# Patient Record
Sex: MALE | Race: WHITE | NOT HISPANIC OR LATINO | Employment: FULL TIME | URBAN - METROPOLITAN AREA
[De-identification: names, ages, dates, MRNs, and addresses within clinical notes are randomized per-mention and may not be internally consistent; named-entity substitution may affect disease eponyms.]

---

## 2017-03-17 ENCOUNTER — OFFICE VISIT (OUTPATIENT)
Dept: MEDICAL GROUP | Facility: MEDICAL CENTER | Age: 72
End: 2017-03-17
Payer: MEDICARE

## 2017-03-17 VITALS
TEMPERATURE: 98 F | SYSTOLIC BLOOD PRESSURE: 150 MMHG | WEIGHT: 214 LBS | BODY MASS INDEX: 33.59 KG/M2 | HEART RATE: 82 BPM | RESPIRATION RATE: 16 BRPM | OXYGEN SATURATION: 98 % | DIASTOLIC BLOOD PRESSURE: 88 MMHG | HEIGHT: 67 IN

## 2017-03-17 DIAGNOSIS — I25.10 CORONARY ARTERY DISEASE INVOLVING NATIVE CORONARY ARTERY OF NATIVE HEART WITHOUT ANGINA PECTORIS: ICD-10-CM

## 2017-03-17 DIAGNOSIS — Z86.010 HISTORY OF COLONOSCOPY WITH POLYPECTOMY: ICD-10-CM

## 2017-03-17 DIAGNOSIS — Z12.5 SCREENING PSA (PROSTATE SPECIFIC ANTIGEN): ICD-10-CM

## 2017-03-17 DIAGNOSIS — I25.2 HISTORY OF MI (MYOCARDIAL INFARCTION): ICD-10-CM

## 2017-03-17 DIAGNOSIS — Z98.890 HISTORY OF COLONOSCOPY WITH POLYPECTOMY: ICD-10-CM

## 2017-03-17 DIAGNOSIS — E66.9 OBESITY (BMI 30-39.9): ICD-10-CM

## 2017-03-17 PROBLEM — Z86.0100 HISTORY OF COLONOSCOPY WITH POLYPECTOMY: Status: ACTIVE | Noted: 2017-03-17

## 2017-03-17 PROCEDURE — 99204 OFFICE O/P NEW MOD 45 MIN: CPT | Performed by: NURSE PRACTITIONER

## 2017-03-17 RX ORDER — ASPIRIN 81 MG/1
81 TABLET, CHEWABLE ORAL
Status: ON HOLD | COMMUNITY
End: 2021-10-16

## 2017-03-17 ASSESSMENT — PATIENT HEALTH QUESTIONNAIRE - PHQ9: CLINICAL INTERPRETATION OF PHQ2 SCORE: 0

## 2017-03-17 NOTE — MR AVS SNAPSHOT
"        Alexey Caballero   3/17/2017 2:00 PM   Office Visit   MRN: [id number]    Department:  [facility]   Dept Phone:  [phone]    Description:  Male : 1945   Provider:  SATISH Abdi           Reason for Visit     Establish Care           Allergies as of 3/17/2017     No Known Allergies      You were diagnosed with     History of MI (myocardial infarction)   [440646]       Coronary artery disease involving native coronary artery of native heart without angina pectoris   [9402823]       Elevated blood pressure   [515507]       History of colonoscopy with polypectomy   [682128]       Screening PSA (prostate specific antigen)   [742141]         Vital Signs     Blood Pressure Pulse Temperature Respirations Height Weight    150/88 mmHg 82 36.7 °C (98 °F) 16 1.702 m (5' 7.01\") 97.07 kg (214 lb)    Body Mass Index Oxygen Saturation Smoking Status             33.51 kg/m2 98% Former Smoker         Basic Information     Date Of Birth Sex Race Ethnicity Preferred Language    1945 Male White Non- English      Problem List              ICD-10-CM Priority Class Noted - Resolved    History of MI (myocardial infarction) I25.2   3/17/2017 - Present    Coronary artery disease involving native coronary artery of native heart without angina pectoris I25.10   3/17/2017 - Present    Elevated blood pressure I10   3/17/2017 - Present    History of colonoscopy with polypectomy Z98.890, Z86.010   3/17/2017 - Present      Health Maintenance        Date Due Completion Dates    IMM DTaP/Tdap/Td Vaccine (1 - Tdap) 1962 ---    COLONOSCOPY 1993 ---    IMM ZOSTER VACCINE 2003 ---    IMM PNEUMOCOCCAL 65+ (ADULT) LOW/MEDIUM RISK SERIES (1 of 2 - PCV13) 2008 ---    IMM INFLUENZA (1) 2016 ---            Current Immunizations     No immunizations on file.      Below and/or attached are the medications your provider expects you to take. Review all of your home medications and newly ordered " medications with your provider and/or pharmacist. Follow medication instructions as directed by your provider and/or pharmacist. Please keep your medication list with you and share with your provider. Update the information when medications are discontinued, doses are changed, or new medications (including over-the-counter products) are added; and carry medication information at all times in the event of emergency situations     Allergies:  No Known Allergies          Medications  Valid as of: March 17, 2017 -  2:43 PM    Generic Name Brand Name Tablet Size Instructions for use    Aspirin (Chew Tab) ASA 81 MG Take 81 mg by mouth every day.        .                 Medicines prescribed today were sent to:     [id number] - [place], [place] - [address]    [address] [place] [address]    Phone: [phone] Fax: [phone]    Open 24 Hours?: No      Medication refill instructions:       If your prescription bottle indicates you have medication refills left, it is not necessary to call your provider’s office. Please contact your pharmacy and they will refill your medication.    If your prescription bottle indicates you do not have any refills left, you may request refills at any time through one of the following ways: The online [employer] system (except Urgent Care), by calling your provider’s office, or by asking your pharmacy to contact your provider’s office with a refill request. Medication refills are processed only during regular business hours and may not be available until the next business day. Your provider may request additional information or to have a follow-up visit with you prior to refilling your medication.   *Please Note: Medication refills are assigned a new Rx number when refilled electronically. Your pharmacy may indicate that no refills were authorized even though a new prescription for the same medication is available at the pharmacy. Please request the medicine by name with the  pharmacy before contacting your provider for a refill.        Your To Do List     Future Labs/Procedures Complete By Expires    COMP METABOLIC PANEL  As directed 3/18/2018    CRP HIGH SENSITIVE (CARDIAC)  As directed 3/18/2018    LIPID PROFILE  As directed 3/18/2018    PROSTATE SPECIFIC AG SCREENING  As directed 3/18/2018         MyChart Access Code: Activation code not generated  Current MyChart Status: Active

## 2017-03-17 NOTE — PROGRESS NOTES
Chief Complaint   Patient presents with   • [facility] Care     Alexey Caballero is a 71 y.o. male here to [facility], he is recently relocated from [place]. 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 We discussed:    History of MI (myocardial infarction)  2007  Last stress test nearly normal, very minimal residual damage  Had been on several medications following the initial event- statin, metoprolol, plavix, ASA  Gradually discontinued all medications, last took statin prior to moving here a few months ago  He understands his risk for recurrence is increased when not on medication, states that this is been thoroughly reviewed with him in the past  He is exercising regularly, following a very healthy diet  Coronary artery disease involving native coronary artery of native heart without angina pectoris  2 stents placed in 2007  No recent chest pain  Elevated blood pressure  Previously on metoprolol then atenolol  Took himself medication over period of several years  BP today 150/88  Home readings around 135/80 but has not checked recently  No CP, sob, dizziness  History of colonoscopy with polypectomy  2010 with suspicious polyp, repeat completed in 2013 was normal    Current medicines (including changes today)  Current Outpatient Prescriptions   Medication Sig Dispense Refill   • aspirin (ASA) 81 MG Chew Tab chewable tablet Take 81 mg by mouth every day.       No current facility-administered medications for this visit.     He  has a past medical history of Allergy; Arthritis; Blood transfusion without reported diagnosis; Cancer (CMS-[facility]); and Heart attack (CMS-[facility]).  He  has past surgical history that includes colon resection; open reduction; and athroplasty.  Social History   Substance Use Topics   • Smoking status: Former Smoker   • Smokeless tobacco: Never Used   • Alcohol Use: Yes     Social History     Social History Narrative   • No narrative on file     Family History   Problem Relation Age of  "Onset   • Arthritis Father    • Heart Disease Father    • Hyperlipidemia Father      No family status information on file.     ROS  Problems listed discussed above, all other systems reviewed and negative     Objective:     Blood pressure 150/88, pulse 82, temperature 36.7 °C (98 °F), resp. rate 16, height 1.702 m (5' 7.01\"), weight 97.07 kg (214 lb), SpO2 98 %. Body mass index is 33.51 kg/(m^2).  Physical Exam:  General: Alert, oriented in no acute distress.  Eye contact is good, speech is normal, affect calm  HEENT: EOMI, perrl, Oral mucosa pink moist, no lesions. Nares patent. TMs gray with good landmarks bilaterally. No cervical or supraclavicular lymphadenopathy  Lungs: clear to auscultation bilaterally, good aeration, normal effort. No wheeze/ rhonchi/ rales.  CV: regular rate and rhythm, S1, S2. No murmur, no JVD, no edema. Pedal pulses 2 + bilaterally  Abdomen: soft, nontender, BS x4, no hepatosplenomegaly.  Ext: color normal, vascularity normal, temperature normal. No rash or lesions.  MS: no point tenderness over spine, no obvious deformity. No joint swelling or redness. Strength is 5/5 globally  Neuro: DTR 2+ bilaterally   Assessment and Plan:   The following treatment plan was discussed   1. History of MI (myocardial infarction)   MI in 2007, he has gradually discontinued all of his medications in the last few years. Feels that he is doing very well from a cardiac standpoint, not had any chest pain, exercises regularly without difficulty. We have reviewed general recommendation for continuation of statins in patients with known CAD, he verbalizes full understanding and is adamantly opposed to restarting any medication at this time. Check labs as listed below, follow-up pending results    2. Coronary artery disease involving native coronary artery of native heart without angina pectoris   same as above  LIPID PROFILE    CRP HIGH SENSITIVE (CARDIAC)   3. Elevated blood pressure   blood pressure 150/80 at " the office today. Encourage more home monitoring, notify me for readings greater than 140/90  COMP METABOLIC PANEL   4. History of colonoscopy with polypectomy   colonoscopy record requested    5. Screening PSA (prostate specific antigen)  PROSTATE SPECIFIC AG SCREENING   6. Obesity (BMI 30-39.9)  Patient identified as having weight management issue.  Appropriate orders and counseling given.       Educated in proper administration of medication(s) ordered today including safety, possible SE, risks, benefits, rationale and alternatives to therapy.     Followup: Pending labs             Please note that this dictation was created using voice recognition software. I have worked with consultants from the vendor as well as technical experts from [employer] [facility] [employer] to optimize the interface. I have made every reasonable attempt to correct obvious errors, but I expect that there are errors of grammar and possibly content that I did not discover before finalizing the note.

## 2017-03-17 NOTE — ASSESSMENT & PLAN NOTE
2007  Last stress test nearly normal, very minimal residual damage  Had been on several medications following the initial event- statin, metoprolol, plavix, ASA  Gradually discontinued all medications, last took statin prior to moving here a few months ago  He understands his risk for recurrence is increased when not on medication, states that this is been thoroughly reviewed with him in the past

## 2017-03-17 NOTE — ASSESSMENT & PLAN NOTE
Previously on metoprolol then atenolol  Took himself medication over period of several years  BP today 150/88  Home readings around 135/80 but has not checked recently  No CP, sob, dizziness

## 2017-03-20 ENCOUNTER — HOSPITAL ENCOUNTER (OUTPATIENT)
Dept: LAB | Facility: MEDICAL CENTER | Age: 72
End: 2017-03-20
Attending: NURSE PRACTITIONER
Payer: MEDICARE

## 2017-03-20 DIAGNOSIS — Z12.5 SCREENING PSA (PROSTATE SPECIFIC ANTIGEN): ICD-10-CM

## 2017-03-20 DIAGNOSIS — I25.10 CORONARY ARTERY DISEASE INVOLVING NATIVE CORONARY ARTERY OF NATIVE HEART WITHOUT ANGINA PECTORIS: ICD-10-CM

## 2017-03-20 LAB
ALBUMIN SERPL BCP-MCNC: 4.4 G/DL (ref 3.2–4.9)
ALBUMIN/GLOB SERPL: 1.4 G/DL
ALP SERPL-CCNC: 60 U/L (ref 30–99)
ALT SERPL-CCNC: 21 U/L (ref 2–50)
ANION GAP SERPL CALC-SCNC: 7 MMOL/L (ref 0–11.9)
AST SERPL-CCNC: 21 U/L (ref 12–45)
BILIRUB SERPL-MCNC: 0.7 MG/DL (ref 0.1–1.5)
BUN SERPL-MCNC: 14 MG/DL (ref 8–22)
CALCIUM SERPL-MCNC: 9.8 MG/DL (ref 8.5–10.5)
CHLORIDE SERPL-SCNC: 104 MMOL/L (ref 96–112)
CHOLEST SERPL-MCNC: 225 MG/DL (ref 100–199)
CO2 SERPL-SCNC: 30 MMOL/L (ref 20–33)
CREAT SERPL-MCNC: 1.1 MG/DL (ref 0.5–1.4)
CRP SERPL HS-MCNC: 0.3 MG/L (ref 0–7.5)
GLOBULIN SER CALC-MCNC: 3.1 G/DL (ref 1.9–3.5)
GLUCOSE SERPL-MCNC: 99 MG/DL (ref 65–99)
HDLC SERPL-MCNC: 58 MG/DL
LDLC SERPL CALC-MCNC: 153 MG/DL
POTASSIUM SERPL-SCNC: 4.6 MMOL/L (ref 3.6–5.5)
PROT SERPL-MCNC: 7.5 G/DL (ref 6–8.2)
PSA SERPL DL<=0.01 NG/ML-MCNC: 1.82 NG/ML (ref 0–4)
SODIUM SERPL-SCNC: 141 MMOL/L (ref 135–145)
TRIGL SERPL-MCNC: 71 MG/DL (ref 0–149)

## 2017-03-20 PROCEDURE — 84153 ASSAY OF PSA TOTAL: CPT

## 2017-03-20 PROCEDURE — 80061 LIPID PANEL: CPT

## 2017-03-20 PROCEDURE — 80053 COMPREHEN METABOLIC PANEL: CPT

## 2017-03-20 PROCEDURE — 36415 COLL VENOUS BLD VENIPUNCTURE: CPT

## 2017-03-20 PROCEDURE — 86141 C-REACTIVE PROTEIN HS: CPT

## 2017-12-12 ENCOUNTER — OFFICE VISIT (OUTPATIENT)
Dept: MEDICAL GROUP | Facility: MEDICAL CENTER | Age: 72
End: 2017-12-12
Payer: MEDICARE

## 2017-12-12 VITALS
HEIGHT: 67 IN | WEIGHT: 219.4 LBS | BODY MASS INDEX: 34.44 KG/M2 | SYSTOLIC BLOOD PRESSURE: 138 MMHG | TEMPERATURE: 97.2 F | HEART RATE: 65 BPM | OXYGEN SATURATION: 97 % | DIASTOLIC BLOOD PRESSURE: 84 MMHG

## 2017-12-12 DIAGNOSIS — L82.1 SEBORRHEIC KERATOSIS: ICD-10-CM

## 2017-12-12 DIAGNOSIS — I25.2 HISTORY OF MI (MYOCARDIAL INFARCTION): ICD-10-CM

## 2017-12-12 DIAGNOSIS — Z12.11 SCREENING FOR COLON CANCER: ICD-10-CM

## 2017-12-12 DIAGNOSIS — E78.00 PURE HYPERCHOLESTEROLEMIA: ICD-10-CM

## 2017-12-12 DIAGNOSIS — E66.9 OBESITY (BMI 30-39.9): ICD-10-CM

## 2017-12-12 DIAGNOSIS — I25.10 CORONARY ARTERY DISEASE INVOLVING NATIVE CORONARY ARTERY OF NATIVE HEART WITHOUT ANGINA PECTORIS: ICD-10-CM

## 2017-12-12 PROCEDURE — 99214 OFFICE O/P EST MOD 30 MIN: CPT | Performed by: NURSE PRACTITIONER

## 2017-12-12 NOTE — ASSESSMENT & PLAN NOTE
Last labs reviewed, on a statin at one point as discussed above  Now states it is following a very healthy diet, exercising regularly

## 2017-12-12 NOTE — PROGRESS NOTES
"Subjective:     Chief Complaint   Patient presents with   • Other     mole      Alexey Caballero is a 72 y.o. male here today to follow up on:    Seborrheic keratosis  Mid back, present for the last several years, does feel it is growing recently  No pain, bleeding    Coronary artery disease involving native coronary artery of native heart without angina pectoris  2 stents in place, MI in 2007  At one point he was on metoprolol, statin, Plavix, aspirin  Gradually discontinued all meds other than aspirin  States medication \"made me feel terrible\" and believes all medication are poison, adamantly opposed to any medication  Has not seen a cardiologist last few years  No recent chest pain, diaphoresis, activity intolerance    Pure hypercholesterolemia  Last labs reviewed, on a statin at one point as discussed above  Now states it is following a very healthy diet, exercising regularly       Current medicines (including changes today)  Current Outpatient Prescriptions   Medication Sig Dispense Refill   • aspirin (ASA) 81 MG Chew Tab chewable tablet Take 81 mg by mouth every day.       No current facility-administered medications for this visit.      He  has a past medical history of Allergy; Arthritis; Blood transfusion without reported diagnosis; Cancer (CMS-[facility]); and Heart attack.    ROS included above     Objective:     Blood pressure 138/84, pulse 65, temperature 36.2 °C (97.2 °F), height 1.702 m (5' 7.01\"), weight 99.5 kg (219 lb 6.4 oz), SpO2 97 %. Body mass index is 34.35 kg/m².     Physical Exam:  General: Alert, oriented in no acute distress.  Eye contact is good, speech is normal, affect calm  Lungs: clear to auscultation bilaterally, normal effort, no wheeze/ rhonchi/ rales.  CV: regular rate and rhythm, S1, S2, no murmur  Abdomen: soft, nontender  Ext: Approximately 3 cm rough raised hyperpigmented lesion in the mid back. No surrounding erythema, no drainage. No edema, color normal, vascularity normal, temperature " normal    Assessment and Plan:   The following treatment plan was discussed  1. Seborrheic keratosis  Benign growth. Reassurance given. Continue to monitor    2. Pure hypercholesterolemia  Previously on statin, now working on diet and exercise. Adamantly opposed to any medications    3. Coronary artery disease involving native coronary artery of native heart without angina pectoris  MI in 2007, 2 stents placed. Gradually discontinued all of this medication with the exception of aspirin. Has not seen a cardiologist in the last few years. Referral placed for consultation. Discussed risk for recurrence of MI, patient verbalizes understanding and again declines any medication  REFERRAL TO CARDIOLOGY   4. History of MI (myocardial infarction)  REFERRAL TO CARDIOLOGY   5. Screening for colon cancer  OCCULT BLOOD FECES IMMUNOASSAY   6. Obesity (BMI 30-39.9)  Patient identified as having weight management issue.  Appropriate orders and counseling given.       Followup: As needed         Please note that this dictation was created using voice recognition software. I have worked with consultants from the vendor as well as technical experts from [facility] [employer] to optimize the interface. I have made every reasonable attempt to correct obvious errors, but I expect that there are errors of grammar and possibly content that I did not discover before finalizing the note.

## 2017-12-12 NOTE — ASSESSMENT & PLAN NOTE
"2 stents in place, MI in 2007  At one point he was on metoprolol, statin, Plavix, aspirin  Gradually discontinued all meds other than aspirin  States medication \"made me feel terrible\" and believes all medication are poison, adamantly opposed to any medication  Has not seen a cardiologist last few years  No recent chest pain, diaphoresis, activity intolerance  "

## 2018-01-03 ENCOUNTER — OFFICE VISIT (OUTPATIENT)
Dept: CARDIOLOGY | Facility: MEDICAL CENTER | Age: 73
End: 2018-01-03
Payer: MEDICARE

## 2018-01-03 VITALS
HEART RATE: 72 BPM | DIASTOLIC BLOOD PRESSURE: 70 MMHG | WEIGHT: 217 LBS | HEIGHT: 67 IN | OXYGEN SATURATION: 92 % | SYSTOLIC BLOOD PRESSURE: 130 MMHG | BODY MASS INDEX: 34.06 KG/M2

## 2018-01-03 DIAGNOSIS — Z78.9 STATIN INTOLERANCE: ICD-10-CM

## 2018-01-03 DIAGNOSIS — I10 ESSENTIAL HYPERTENSION: ICD-10-CM

## 2018-01-03 DIAGNOSIS — I25.2 OLD MI (MYOCARDIAL INFARCTION): ICD-10-CM

## 2018-01-03 DIAGNOSIS — E66.9 OBESITY (BMI 30.0-34.9): ICD-10-CM

## 2018-01-03 DIAGNOSIS — Z95.5 STENTED CORONARY ARTERY: ICD-10-CM

## 2018-01-03 DIAGNOSIS — I25.119 CORONARY ARTERY DISEASE WITH ANGINA PECTORIS, UNSPECIFIED VESSEL OR LESION TYPE, UNSPECIFIED WHETHER NATIVE OR TRANSPLANTED HEART (HCC): ICD-10-CM

## 2018-01-03 DIAGNOSIS — E78.5 HYPERLIPIDEMIA, UNSPECIFIED HYPERLIPIDEMIA TYPE: ICD-10-CM

## 2018-01-03 LAB — EKG IMPRESSION: NORMAL

## 2018-01-03 PROCEDURE — 93000 ELECTROCARDIOGRAM COMPLETE: CPT | Performed by: INTERNAL MEDICINE

## 2018-01-03 PROCEDURE — 99204 OFFICE O/P NEW MOD 45 MIN: CPT | Mod: 25 | Performed by: INTERNAL MEDICINE

## 2018-01-03 NOTE — PROGRESS NOTES
Cardiology Consult Note:    Anabella Richard  Date & Time note created:    1/3/2018   1:40 PM       Patient ID:  Name:             Alexey Caballero     YOB: 1945  Age:                 72 y.o.  male   MRN:               [id number]                                                             Chief Complaint:   Chief Complaint   Patient presents with   • Coronary Artery Disease     New patient              History of Present Illness:   Alexey Caballero has move from [facility] since 6/2016 to establish with us; H/o MI (inf?) 10 yrs ago with 2 stents; No able to use Statin; 4/1975 hurt on job    Review of Systems:     Constitutional: Denies fevers, Denies weight changes  Eyes: Denies changes in vision, no eye pain  Ears/Nose/Throat/Mouth: Denies nasal congestion or sore throat   Cardiovascular: Denies chest pain or palpitations   Respiratory: Denies shortness of breath , Denies cough  Gastrointestinal/Hepatic: Denies abdominal pain, nausea, vomiting, diarrhea, constipation or GI bleeding   Genitourinary: Denies bladder dysfunction, dysuria or frequency  Musculoskeletal/Rheum: Denies  joint pain and swelling   Skin/Breast: Denies rash, denies breast lumps or discharge  Neurological: Denies headache, confusion, memory loss or focal weakness/parasthesias  Psychiatric: denies mood disorder   Endocrine: denies hx of diabetes or thyroid dysfunction  Heme/Oncology/Lymph Nodes: Denies enlarged lymph nodes, denies bruising or known bleeding disorder  Allergic/Immunologic: Denies hx of allergies      All other systems were reviewed and are negative (AMA/CMS criteria)    Past Medical History:   Past Medical History:   Diagnosis Date   • Allergy    • Arthritis    • Blood transfusion without reported diagnosis    • Cancer (CMS-HCC)    • Heart attack          Past Surgical History:  Past Surgical History:   Procedure Laterality Date   • ATHROPLASTY     • COLON RESECTION     • OPEN REDUCTION         Hospital Medications:    Current  "Outpatient Prescriptions:   •  aspirin (ASA) 81 MG Chew Tab chewable tablet, Take 81 mg by mouth every day., Disp: , Rfl:     Current Outpatient Medications:  Current Outpatient Prescriptions   Medication Sig Dispense Refill   • aspirin (ASA) 81 MG Chew Tab chewable tablet Take 81 mg by mouth every day.       No current facility-administered medications for this visit.          Medication Allergy/Sensitivities:  Allergies   Allergen Reactions   • Tape Rash     Pt requests only paper tape as hypoallergenic tape gives him a severe rash.        Family History:    Family History   Problem Relation Age of Onset   • Arthritis Father    • Heart Disease Father    • Hyperlipidemia Father        Social History:  Social History     Social History   • Marital status: Single     Spouse name: N/A   • Number of children: N/A   • Years of education: N/A     Occupational History   • Not on file.     Social History Main Topics   • Smoking status: Former Smoker   • Smokeless tobacco: Never Used   • Alcohol use 0.0 oz/week   • Drug use: No   • Sexual activity: No     Other Topics Concern   • Not on file     Social History Narrative   • No narrative on file         Physical Exam:  Vitals  Weight/BMI: Body mass index is 33.98 kg/m².  Blood pressure 130/70, pulse 72, height 1.702 m (5' 7.01\"), weight 98.4 kg (217 lb), SpO2 92 %.  Vitals:    01/03/18 1319   BP: 130/70   Pulse: 72   SpO2: 92%   Weight: 98.4 kg (217 lb)   Height: 1.702 m (5' 7.01\")     Oxygen Therapy:  Pulse Oximetry: 92 %  General Appearance:   Well developed, Well nourished, No acute distress, Non-toxic appearance.   HENT:  Normocephalic, Atraumatic, Oropharynx moist mucous membranes, Dentition: , Nose normal.    Eyes:  PERRLA, EOMI, Conjunctiva normal, No discharge.  Neck:  Normal range of motion, No cervical tenderness, Supple, No stridor, no JVD .  No thyromegaly.  No carotid bruit.  Cardiovascular:  Normal heart rate, Normal rhythm,  S1, S2, no S3,  S4; No gallops; No " murmurs, No rubs, .   Extremitites with intact distal pulses, no cyanosis, clubbing or edema.  No heaves, thrills, HJR;  Peripheral pulses: carotid 2+, brachial 2+, radial 2+, ulnar 2+, femoral 2+, popliteal 2+, PT 2+, DP 2+;  Lungs:  Respiratory effort is normal. Normal breath sounds, breath sounds clear to auscultation bilaterally,  no rales, no rhonchi, no wheezing.   Abdomen: Bowel sounds normal, Soft, No tenderness, No guarding, No rebound, No masses, No hepatosplenomegaly.  Skin: Warm, Dry, No erythema, No rash, no induration or crepitus.  Neurologic: Alert & oriented x 3, Normal motor function, Normal sensory function, No focal deficits noted, cranial nerves II through XII are normal,  normal gait.  Psychiatric: Affect normal, Judgment normal, Mood normal.      Data Review:     Records reviewed and summarized in current documentation    Lab Data Review:  Lab Results   Component Value Date/Time    SODIUM 141 03/20/2017 11:55 AM    POTASSIUM 4.6 03/20/2017 11:55 AM    CHLORIDE 104 03/20/2017 11:55 AM    CO2 30 03/20/2017 11:55 AM    GLUCOSE 99 03/20/2017 11:55 AM    BUN 14 03/20/2017 11:55 AM    CREATININE 1.10 03/20/2017 11:55 AM   Results for JERICA YBARRA (MRN [id number]) as of 1/3/2018 13:39   Ref. Range 3/20/2017 11:55   Cholesterol,Tot Latest Ref Range: 100 - 199 mg/dL 225 (H)   Triglycerides Latest Ref Range: 0 - 149 mg/dL 71   HDL Latest Ref Range: >=40 mg/dL 58   LDL Latest Ref Range: <100 mg/dL 153 (H)      No results found for: PROTHROMBTM, INR   No results found for: WBC, RBC, HEMOGLOBIN, HEMATOCRIT, MCV, MCH, MCHC, MPV, NEUTSPOLYS, LYMPHOCYTES, MONOCYTES, EOSINOPHILS, BASOPHILS, HYPOCHROMIA, ANISOCYTOSIS     Imaging/Procedures Review:    To my review shows:na    EKG To my review shows:SR with abn inf t waves    Assessment and Plan.   72 y.o. male has MI 10 yrs ago with 2 stents; Less active than before; Unable to use statin with intolerance ; last use 1.5 yrs ago; High CV risk and should consider  PCSK9 inhibitor;   No sleep issues;   Pls see orders    1. Coronary artery disease with angina pectoris, unspecified vessel or lesion type, unspecified whether native or transplanted heart (CMS-HCC)    - EKG    2. Old MI (myocardial infarction)  Inf?    3. Stented coronary artery  From [place]; need to have record    4. Hyperlipidemia, unspecified hyperlipidemia type  Recheck; LDL is elevated     5. Essential hypertension  controlled    6. Obesity (BMI 30.0-34.9)  discussed      Return to clinic in  1 , months  1. Coronary artery disease with angina pectoris, unspecified vessel or lesion type, unspecified whether native or transplanted heart (CMS-HCC)  EKG   2. Old MI (myocardial infarction)      2007 with 2 stents   3. Stented coronary artery     4. Hyperlipidemia, unspecified hyperlipidemia type     5. Essential hypertension     6. Obesity (BMI 30.0-34.9)

## 2018-01-03 NOTE — LETTER
[facility]   [address]   [address]  [place] [address]  Phone: [phone]  Fax: [phone]              Alexey Caballero  1945    Encounter Date: 1/3/2018    SATISH Abdi    Thank you for the referral. I had the pleasure of seeing Alexey Caballero today in cardiology clinic. I've attached my visit note below. If you have any questions please feel free to give me a call anytime.      Anabella Richard MD, PhD, [facility]  Cardiology and Lipidology  [facility]                                                                    PROGRESS NOTE:  Cardiology Consult Note:    Anabella Richard  Date & Time note created:    1/3/2018   1:40 PM       Patient ID:  Name:             Alexey Caballero     YOB: 1945  Age:                 72 y.o.  male   MRN:               [id number]                                                             Chief Complaint:   Chief Complaint   Patient presents with   • Coronary Artery Disease     New patient              History of Present Illness:   Alexey Caballero has move from [facility] since 6/2016 to establish with us; H/o MI (inf?) 10 yrs ago with 2 stents; No able to use Statin; 4/1975 hurt on job    Review of Systems:     Constitutional: Denies fevers, Denies weight changes  Eyes: Denies changes in vision, no eye pain  Ears/Nose/Throat/Mouth: Denies nasal congestion or sore throat   Cardiovascular: Denies chest pain or palpitations   Respiratory: Denies shortness of breath , Denies cough  Gastrointestinal/Hepatic: Denies abdominal pain, nausea, vomiting, diarrhea, constipation or GI bleeding   Genitourinary: Denies bladder dysfunction, dysuria or frequency  Musculoskeletal/Rheum: Denies  joint pain and swelling   Skin/Breast: Denies rash, denies breast lumps or discharge  Neurological: Denies headache, confusion, memory loss or focal weakness/parasthesias  Psychiatric: denies  "mood disorder   Endocrine: denies hx of diabetes or thyroid dysfunction  Heme/Oncology/Lymph Nodes: Denies enlarged lymph nodes, denies bruising or known bleeding disorder  Allergic/Immunologic: Denies hx of allergies      All other systems were reviewed and are negative (AMA/CMS criteria)    Past Medical History:   Past Medical History:   Diagnosis Date   • Allergy    • Arthritis    • Blood transfusion without reported diagnosis    • Cancer (CMS-HCC)    • Heart attack          Past Surgical History:  Past Surgical History:   Procedure Laterality Date   • ATHROPLASTY     • COLON RESECTION     • OPEN REDUCTION         Hospital Medications:    Current Outpatient Prescriptions:   •  aspirin (ASA) 81 MG Chew Tab chewable tablet, Take 81 mg by mouth every day., Disp: , Rfl:     Current Outpatient Medications:  Current Outpatient Prescriptions   Medication Sig Dispense Refill   • aspirin (ASA) 81 MG Chew Tab chewable tablet Take 81 mg by mouth every day.       No current facility-administered medications for this visit.          Medication Allergy/Sensitivities:  Allergies   Allergen Reactions   • Tape Rash     Pt requests only paper tape as hypoallergenic tape gives him a severe rash.        Family History:    Family History   Problem Relation Age of Onset   • Arthritis Father    • Heart Disease Father    • Hyperlipidemia Father        Social History:  Social History     Social History   • Marital status: Single     Spouse name: N/A   • Number of children: N/A   • Years of education: N/A     Occupational History   • Not on file.     Social History Main Topics   • Smoking status: Former Smoker   • Smokeless tobacco: Never Used   • Alcohol use 0.0 oz/week   • Drug use: No   • Sexual activity: No     Other Topics Concern   • Not on file     Social History Narrative   • No narrative on file         Physical Exam:  Vitals  Weight/BMI: Body mass index is 33.98 kg/m².  Blood pressure 130/70, pulse 72, height 1.702 m (5' 7.01\"), " "weight 98.4 kg (217 lb), SpO2 92 %.  Vitals:    01/03/18 1319   BP: 130/70   Pulse: 72   SpO2: 92%   Weight: 98.4 kg (217 lb)   Height: 1.702 m (5' 7.01\")     Oxygen Therapy:  Pulse Oximetry: 92 %  General Appearance:   Well developed, Well nourished, No acute distress, Non-toxic appearance.   HENT:  Normocephalic, Atraumatic, Oropharynx moist mucous membranes, Dentition: , Nose normal.    Eyes:  PERRLA, EOMI, Conjunctiva normal, No discharge.  Neck:  Normal range of motion, No cervical tenderness, Supple, No stridor, no JVD .  No thyromegaly.  No carotid bruit.  Cardiovascular:  Normal heart rate, Normal rhythm,  S1, S2, no S3,  S4; No gallops; No murmurs, No rubs, .   Extremitites with intact distal pulses, no cyanosis, clubbing or edema.  No heaves, thrills, HJR;  Peripheral pulses: carotid 2+, brachial 2+, radial 2+, ulnar 2+, femoral 2+, popliteal 2+, PT 2+, DP 2+;  Lungs:  Respiratory effort is normal. Normal breath sounds, breath sounds clear to auscultation bilaterally,  no rales, no rhonchi, no wheezing.   Abdomen: Bowel sounds normal, Soft, No tenderness, No guarding, No rebound, No masses, No hepatosplenomegaly.  Skin: Warm, Dry, No erythema, No rash, no induration or crepitus.  Neurologic: Alert & oriented x 3, Normal motor function, Normal sensory function, No focal deficits noted, cranial nerves II through XII are normal,  normal gait.  Psychiatric: Affect normal, Judgment normal, Mood normal.      Data Review:     Records reviewed and summarized in current documentation    Lab Data Review:  Lab Results   Component Value Date/Time    SODIUM 141 03/20/2017 11:55 AM    POTASSIUM 4.6 03/20/2017 11:55 AM    CHLORIDE 104 03/20/2017 11:55 AM    CO2 30 03/20/2017 11:55 AM    GLUCOSE 99 03/20/2017 11:55 AM    BUN 14 03/20/2017 11:55 AM    CREATININE 1.10 03/20/2017 11:55 AM   Results for TATE JERICA (MRN [id number]) as of 1/3/2018 13:39   Ref. Range 3/20/2017 11:55   Cholesterol,Tot Latest Ref Range: 100 - 199 " mg/dL 225 (H)   Triglycerides Latest Ref Range: 0 - 149 mg/dL 71   HDL Latest Ref Range: >=40 mg/dL 58   LDL Latest Ref Range: <100 mg/dL 153 (H)      No results found for: PROTHROMBTM, INR   No results found for: WBC, RBC, HEMOGLOBIN, HEMATOCRIT, MCV, MCH, MCHC, MPV, NEUTSPOLYS, LYMPHOCYTES, MONOCYTES, EOSINOPHILS, BASOPHILS, HYPOCHROMIA, ANISOCYTOSIS     Imaging/Procedures Review:    To my review shows:na    EKG To my review shows:SR with abn inf t waves    Assessment and Plan.   72 y.o. male has MI 10 yrs ago with 2 stents; Less active than before; Unable to use statin with intolerance ; last use 1.5 yrs ago; High CV risk and should consider PCSK9 inhibitor;   No sleep issues;   Pls see orders    1. Coronary artery disease with angina pectoris, unspecified vessel or lesion type, unspecified whether native or transplanted heart (CMS-HCC)    - EKG    2. Old MI (myocardial infarction)  Inf?    3. Stented coronary artery  From [place]; need to have record    4. Hyperlipidemia, unspecified hyperlipidemia type  Recheck; LDL is elevated     5. Essential hypertension  controlled    6. Obesity (BMI 30.0-34.9)  discussed      Return to clinic in  1 , months  1. Coronary artery disease with angina pectoris, unspecified vessel or lesion type, unspecified whether native or transplanted heart (CMS-HCC)  EKG   2. Old MI (myocardial infarction)      2007 with 2 stents   3. Stented coronary artery     4. Hyperlipidemia, unspecified hyperlipidemia type     5. Essential hypertension     6. Obesity (BMI 30.0-34.9)           Nina Reddy, VALENCIA.P.R.N.  [address]  [address]  [facility] [address]  VIA In Basket

## 2018-01-09 ENCOUNTER — APPOINTMENT (OUTPATIENT)
Dept: CARDIOLOGY | Facility: MEDICAL CENTER | Age: 73
End: 2018-01-09
Attending: INTERNAL MEDICINE
Payer: MEDICARE

## 2018-01-10 ENCOUNTER — HOSPITAL ENCOUNTER (OUTPATIENT)
Dept: LAB | Facility: MEDICAL CENTER | Age: 73
End: 2018-01-10
Attending: INTERNAL MEDICINE
Payer: MEDICARE

## 2018-01-10 ENCOUNTER — HOSPITAL ENCOUNTER (OUTPATIENT)
Dept: CARDIOLOGY | Facility: MEDICAL CENTER | Age: 73
End: 2018-01-10
Attending: INTERNAL MEDICINE
Payer: MEDICARE

## 2018-01-10 DIAGNOSIS — I25.119 CORONARY ARTERY DISEASE WITH ANGINA PECTORIS, UNSPECIFIED VESSEL OR LESION TYPE, UNSPECIFIED WHETHER NATIVE OR TRANSPLANTED HEART (HCC): ICD-10-CM

## 2018-01-10 DIAGNOSIS — E78.5 HYPERLIPIDEMIA, UNSPECIFIED HYPERLIPIDEMIA TYPE: ICD-10-CM

## 2018-01-10 LAB
ALBUMIN SERPL BCP-MCNC: 4.4 G/DL (ref 3.2–4.9)
ALBUMIN/GLOB SERPL: 1.8 G/DL
ALP SERPL-CCNC: 46 U/L (ref 30–99)
ALT SERPL-CCNC: 20 U/L (ref 2–50)
ANION GAP SERPL CALC-SCNC: 6 MMOL/L (ref 0–11.9)
AST SERPL-CCNC: 20 U/L (ref 12–45)
BILIRUB SERPL-MCNC: 1.2 MG/DL (ref 0.1–1.5)
BUN SERPL-MCNC: 18 MG/DL (ref 8–22)
CALCIUM SERPL-MCNC: 9.5 MG/DL (ref 8.5–10.5)
CHLORIDE SERPL-SCNC: 104 MMOL/L (ref 96–112)
CHOLEST SERPL-MCNC: 233 MG/DL (ref 100–199)
CO2 SERPL-SCNC: 29 MMOL/L (ref 20–33)
CREAT SERPL-MCNC: 1.05 MG/DL (ref 0.5–1.4)
ERYTHROCYTE [DISTWIDTH] IN BLOOD BY AUTOMATED COUNT: 43.1 FL (ref 35.9–50)
GLOBULIN SER CALC-MCNC: 2.5 G/DL (ref 1.9–3.5)
GLUCOSE SERPL-MCNC: 92 MG/DL (ref 65–99)
HCT VFR BLD AUTO: 46.3 % (ref 42–52)
HDLC SERPL-MCNC: 58 MG/DL
HGB BLD-MCNC: 15.5 G/DL (ref 14–18)
LDLC SERPL CALC-MCNC: 158 MG/DL
LV EJECT FRACT  99904: 60
LV EJECT FRACT MOD 2C 99903: 53.5
LV EJECT FRACT MOD 4C 99902: 66.25
LV EJECT FRACT MOD BP 99901: 60.01
MCH RBC QN AUTO: 31.5 PG (ref 27–33)
MCHC RBC AUTO-ENTMCNC: 33.5 G/DL (ref 33.7–35.3)
MCV RBC AUTO: 94.1 FL (ref 81.4–97.8)
PLATELET # BLD AUTO: 245 K/UL (ref 164–446)
PMV BLD AUTO: 12.5 FL (ref 9–12.9)
POTASSIUM SERPL-SCNC: 3.8 MMOL/L (ref 3.6–5.5)
PROT SERPL-MCNC: 6.9 G/DL (ref 6–8.2)
RBC # BLD AUTO: 4.92 M/UL (ref 4.7–6.1)
SODIUM SERPL-SCNC: 139 MMOL/L (ref 135–145)
TRIGL SERPL-MCNC: 87 MG/DL (ref 0–149)
TSH SERPL DL<=0.005 MIU/L-ACNC: 5.85 UIU/ML (ref 0.38–5.33)
WBC # BLD AUTO: 6.5 K/UL (ref 4.8–10.8)

## 2018-01-10 PROCEDURE — 84443 ASSAY THYROID STIM HORMONE: CPT

## 2018-01-10 PROCEDURE — 85027 COMPLETE CBC AUTOMATED: CPT

## 2018-01-10 PROCEDURE — 80053 COMPREHEN METABOLIC PANEL: CPT

## 2018-01-10 PROCEDURE — 80061 LIPID PANEL: CPT

## 2018-01-10 PROCEDURE — 93306 TTE W/DOPPLER COMPLETE: CPT | Mod: 26 | Performed by: INTERNAL MEDICINE

## 2018-01-10 PROCEDURE — 36415 COLL VENOUS BLD VENIPUNCTURE: CPT

## 2018-01-10 PROCEDURE — 93306 TTE W/DOPPLER COMPLETE: CPT

## 2018-01-12 ENCOUNTER — TELEPHONE (OUTPATIENT)
Dept: CARDIOLOGY | Facility: MEDICAL CENTER | Age: 73
End: 2018-01-12

## 2018-01-12 NOTE — TELEPHONE ENCOUNTER
Labs sent to PCP. Pt called with update. No answer, left VM for call back.  Morena CARCAMO RN     ----- Message from Anabella Richard MD,FACC sent at 1/12/2018  6:56 AM PST -----  Pls forward to PCP; Thx  ----- Message -----  From: Morena Menon R.N.  Sent: 1/11/2018  12:42 PM  To: Anabella Richard MD,FACC    F/V with you 3/13th

## 2018-01-18 ENCOUNTER — HOSPITAL ENCOUNTER (OUTPATIENT)
Dept: RADIOLOGY | Facility: MEDICAL CENTER | Age: 73
End: 2018-01-18
Attending: INTERNAL MEDICINE
Payer: MEDICARE

## 2018-01-18 DIAGNOSIS — I25.119 CORONARY ARTERY DISEASE WITH ANGINA PECTORIS, UNSPECIFIED VESSEL OR LESION TYPE, UNSPECIFIED WHETHER NATIVE OR TRANSPLANTED HEART (HCC): ICD-10-CM

## 2018-01-18 PROCEDURE — A9502 TC99M TETROFOSMIN: HCPCS

## 2018-01-18 NOTE — PROGRESS NOTES
NM TM completed, able to achieve greater than 85% THR, SOB at end of TM, and remained SOB for approximately 2 minutes, rr unlabored,  then cough started in recovery,  lungs clear, 95% RA, cough remained non productive, assessed until cough resolved, and diastolic BP at 104, baseline standing diastolic 97.

## 2018-03-13 ENCOUNTER — OFFICE VISIT (OUTPATIENT)
Dept: CARDIOLOGY | Facility: MEDICAL CENTER | Age: 73
End: 2018-03-13
Payer: MEDICARE

## 2018-03-13 VITALS
WEIGHT: 211 LBS | SYSTOLIC BLOOD PRESSURE: 122 MMHG | BODY MASS INDEX: 33.12 KG/M2 | DIASTOLIC BLOOD PRESSURE: 72 MMHG | HEIGHT: 67 IN | HEART RATE: 66 BPM | OXYGEN SATURATION: 95 %

## 2018-03-13 DIAGNOSIS — I10 HTN (HYPERTENSION), MALIGNANT: ICD-10-CM

## 2018-03-13 DIAGNOSIS — I25.10 CORONARY ARTERY DISEASE INVOLVING NATIVE CORONARY ARTERY OF NATIVE HEART WITHOUT ANGINA PECTORIS: ICD-10-CM

## 2018-03-13 DIAGNOSIS — Z79.899 HIGH RISK MEDICATION USE: ICD-10-CM

## 2018-03-13 DIAGNOSIS — Z95.5 STENTED CORONARY ARTERY: ICD-10-CM

## 2018-03-13 DIAGNOSIS — Z78.9 STATIN INTOLERANCE: ICD-10-CM

## 2018-03-13 DIAGNOSIS — Z78.9 MEDICATION INTOLERANCE: ICD-10-CM

## 2018-03-13 DIAGNOSIS — E78.5 DYSLIPIDEMIA: ICD-10-CM

## 2018-03-13 PROCEDURE — 99214 OFFICE O/P EST MOD 30 MIN: CPT | Performed by: INTERNAL MEDICINE

## 2018-03-13 RX ORDER — M-VIT,TX,IRON,MINS/CALC/FOLIC 27MG-0.4MG
1 TABLET ORAL DAILY
COMMUNITY
End: 2021-11-10

## 2018-03-13 ASSESSMENT — ENCOUNTER SYMPTOMS
HEADACHES: 0
SPEECH CHANGE: 0
WEIGHT LOSS: 0
DOUBLE VISION: 0
SENSORY CHANGE: 0
FALLS: 0
EYE PAIN: 0
DEPRESSION: 0
FEVER: 0
HALLUCINATIONS: 0
ABDOMINAL PAIN: 0
CLAUDICATION: 0
BLURRED VISION: 0
PALPITATIONS: 0
SHORTNESS OF BREATH: 0
NAUSEA: 0
PND: 0
COUGH: 0
BRUISES/BLEEDS EASILY: 0
EYE DISCHARGE: 0
MYALGIAS: 0
ORTHOPNEA: 0
BLOOD IN STOOL: 0
DIZZINESS: 0
CHILLS: 0
VOMITING: 0
LOSS OF CONSCIOUSNESS: 0

## 2018-03-13 NOTE — LETTER
[facility]   [address]   [address]  [place] [address]  Phone: [phone]  Fax: [phone]              Alexey Caballero  1945    Encounter Date: 3/13/2018    Ricarda Aguilar M.D.          PROGRESS NOTE:  Subjective:   Alexey Caballero is a 72 y.o. male who presents today for cardiac care and management due to coronary arterial disease status post 2 stents placed in coronary system unknown distribution in 2007 in [place], hypertension, hyperlipidemia. Patient did have transthoracic echocardiogram which showed normal LV function with no significant valvular disease.    Over the years, patient was not able to tolerate beta blockers, ace inhibitors, ARB's, he is only on aspirin at this time.    He has severe allergy to statin because of myalgia. Patient became incapacitated physically. At this time he does not want to try any more statins.    Chief Complaint: cad / pci, hlp, htn.    Past Medical History:   Diagnosis Date   • Allergy    • Arthritis    • Blood transfusion without reported diagnosis    • Cancer (CMS-[facility])    • Heart attack      Past Surgical History:   Procedure Laterality Date   • ATHROPLASTY     • COLON RESECTION     • OPEN REDUCTION       Family History   Problem Relation Age of Onset   • Arthritis Father    • Heart Disease Father    • Hyperlipidemia Father      History   Smoking Status   • Former Smoker   Smokeless Tobacco   • Never Used     Allergies   Allergen Reactions   • Tape Rash     Pt requests only paper tape as hypoallergenic tape gives him a severe rash.      Outpatient Encounter Prescriptions as of 3/13/2018   Medication Sig Dispense Refill   • therapeutic multivitamin-minerals (THERAGRAN-M) Tab Take 1 Tab by mouth every day.     • Alirocumab (PRALUENT) 75 MG/ML Solution Pen-injector Inject 75 mg as instructed every 14 days. 2 PEN 11   • aspirin (ASA) 81 MG Chew Tab chewable tablet Take 81 mg by  "mouth every day.       No facility-administered encounter medications on file as of 3/13/2018.      Review of Systems   Constitutional: Negative for chills, fever, malaise/fatigue and weight loss.   HENT: Negative for ear discharge, ear pain, hearing loss and nosebleeds.    Eyes: Negative for blurred vision, double vision, pain and discharge.   Respiratory: Negative for cough and shortness of breath.    Cardiovascular: Negative for chest pain, palpitations, orthopnea, claudication, leg swelling and PND.   Gastrointestinal: Negative for abdominal pain, blood in stool, melena, nausea and vomiting.   Genitourinary: Negative for dysuria and hematuria.   Musculoskeletal: Negative for falls, joint pain and myalgias.   Skin: Negative for itching and rash.   Neurological: Negative for dizziness, sensory change, speech change, loss of consciousness and headaches.   Endo/Heme/Allergies: Negative for environmental allergies. Does not bruise/bleed easily.   Psychiatric/Behavioral: Negative for depression, hallucinations and suicidal ideas.        Objective:   /72   Pulse 66   Ht 1.702 m (5' 7\")   Wt 95.7 kg (211 lb)   SpO2 95%   BMI 33.05 kg/m²      Physical Exam   Constitutional: He is oriented to person, place, and time. He appears well-developed and well-nourished.   HENT:   Head: Normocephalic and atraumatic.   Eyes: EOM are normal.   Neck: Normal range of motion. No JVD present.   Cardiovascular: Normal rate, regular rhythm, normal heart sounds and intact distal pulses.  Exam reveals no gallop and no friction rub.    No murmur heard.  Bilateral femoral pulses are 2+, bilateral dorsalis pedis pulses are 2+, bilateral posterior tibialis pulses are 2+.   Pulmonary/Chest: No respiratory distress. He has no wheezes. He has no rales. He exhibits no tenderness.   Abdominal: Soft. Bowel sounds are normal. There is no tenderness. There is no rebound and no guarding.   The is no presence of abdominal bruits   "   Musculoskeletal: Normal range of motion.   Neurological: He is alert and oriented to person, place, and time.   Skin: Skin is warm and dry.   Psychiatric: He has a normal mood and affect.   Nursing note and vitals reviewed.      Assessment:     1. Stented coronary artery in 2007  Alirocumab (PRALUENT) 75 MG/ML Solution Pen-injector    COMP METABOLIC PANEL    LIPID PANEL   2. HTN (hypertension), malignant  COMP METABOLIC PANEL    LIPID PANEL   3. Dyslipidemia  Alirocumab (PRALUENT) 75 MG/ML Solution Pen-injector   4. High risk medication use  Alirocumab (PRALUENT) 75 MG/ML Solution Pen-injector    COMP METABOLIC PANEL    LIPID PANEL   5. Coronary artery disease involving native coronary artery of native heart without angina pectoris  Alirocumab (PRALUENT) 75 MG/ML Solution Pen-injector    COMP METABOLIC PANEL    LIPID PANEL   6. Statin intolerance  Alirocumab (PRALUENT) 75 MG/ML Solution Pen-injector    COMP METABOLIC PANEL    LIPID PANEL   7. Medication intolerance BetaBlocker, ACE-I, ARB         Medical Decision Making:  Today's Assessment / Status / Plan:   I spent a significant amount of time discussing to patient about risk reduction in the setting of prior coronary artery disease with PCI and stenting in the coronary system.  I explained to him that it is beneficial for us to treat his LDL along with other risk factors aggressively because of prior history of PCI and stenting.  At this time, patient is open to PCSK9 inhibitor therapy.  We will start that therapy for now.  He does not want to try beta blockers, his inhibitors or ARB so spironolactone. He understands the risks.    I will see patient back in clinic with lab tests and studies results in 3 months.    I thank you Nina for referring patient to our Cardiology Clinic today.        Nina Reddy, VALENCIA.P.R.N.  [address]  [address]  [facility] [address]  VIA In Basket

## 2018-03-13 NOTE — PROGRESS NOTES
Subjective:   Alexey Caballero is a 72 y.o. male who presents today for cardiac care and management due to coronary arterial disease status post 2 stents placed in coronary system unknown distribution in 2007 in [place], hypertension, hyperlipidemia. Patient did have transthoracic echocardiogram which showed normal LV function with no significant valvular disease.    Over the years, patient was not able to tolerate beta blockers, ace inhibitors, ARB's, he is only on aspirin at this time.    He has severe allergy to statin because of myalgia. Patient became incapacitated physically. At this time he does not want to try any more statins.    Chief Complaint: cad / pci, hlp, htn.    Past Medical History:   Diagnosis Date   • Allergy    • Arthritis    • Blood transfusion without reported diagnosis    • Cancer (CMS-HCC)    • Heart attack      Past Surgical History:   Procedure Laterality Date   • ATHROPLASTY     • COLON RESECTION     • OPEN REDUCTION       Family History   Problem Relation Age of Onset   • Arthritis Father    • Heart Disease Father    • Hyperlipidemia Father      History   Smoking Status   • Former Smoker   Smokeless Tobacco   • Never Used     Allergies   Allergen Reactions   • Tape Rash     Pt requests only paper tape as hypoallergenic tape gives him a severe rash.      Outpatient Encounter Prescriptions as of 3/13/2018   Medication Sig Dispense Refill   • therapeutic multivitamin-minerals (THERAGRAN-M) Tab Take 1 Tab by mouth every day.     • Alirocumab (PRALUENT) 75 MG/ML Solution Pen-injector Inject 75 mg as instructed every 14 days. 2 PEN 11   • aspirin (ASA) 81 MG Chew Tab chewable tablet Take 81 mg by mouth every day.       No facility-administered encounter medications on file as of 3/13/2018.      Review of Systems   Constitutional: Negative for chills, fever, malaise/fatigue and weight loss.   HENT: Negative for ear discharge, ear pain, hearing loss and nosebleeds.    Eyes: Negative for  "blurred vision, double vision, pain and discharge.   Respiratory: Negative for cough and shortness of breath.    Cardiovascular: Negative for chest pain, palpitations, orthopnea, claudication, leg swelling and PND.   Gastrointestinal: Negative for abdominal pain, blood in stool, melena, nausea and vomiting.   Genitourinary: Negative for dysuria and hematuria.   Musculoskeletal: Negative for falls, joint pain and myalgias.   Skin: Negative for itching and rash.   Neurological: Negative for dizziness, sensory change, speech change, loss of consciousness and headaches.   Endo/Heme/Allergies: Negative for environmental allergies. Does not bruise/bleed easily.   Psychiatric/Behavioral: Negative for depression, hallucinations and suicidal ideas.        Objective:   /72   Pulse 66   Ht 1.702 m (5' 7\")   Wt 95.7 kg (211 lb)   SpO2 95%   BMI 33.05 kg/m²     Physical Exam   Constitutional: He is oriented to person, place, and time. He appears well-developed and well-nourished.   HENT:   Head: Normocephalic and atraumatic.   Eyes: EOM are normal.   Neck: Normal range of motion. No JVD present.   Cardiovascular: Normal rate, regular rhythm, normal heart sounds and intact distal pulses.  Exam reveals no gallop and no friction rub.    No murmur heard.  Bilateral femoral pulses are 2+, bilateral dorsalis pedis pulses are 2+, bilateral posterior tibialis pulses are 2+.   Pulmonary/Chest: No respiratory distress. He has no wheezes. He has no rales. He exhibits no tenderness.   Abdominal: Soft. Bowel sounds are normal. There is no tenderness. There is no rebound and no guarding.   The is no presence of abdominal bruits   Musculoskeletal: Normal range of motion.   Neurological: He is alert and oriented to person, place, and time.   Skin: Skin is warm and dry.   Psychiatric: He has a normal mood and affect.   Nursing note and vitals reviewed.      Assessment:     1. Stented coronary artery in 2007  Alirocumab (PRALUENT) 75 " MG/ML Solution Pen-injector    COMP METABOLIC PANEL    LIPID PANEL   2. HTN (hypertension), malignant  COMP METABOLIC PANEL    LIPID PANEL   3. Dyslipidemia  Alirocumab (PRALUENT) 75 MG/ML Solution Pen-injector   4. High risk medication use  Alirocumab (PRALUENT) 75 MG/ML Solution Pen-injector    COMP METABOLIC PANEL    LIPID PANEL   5. Coronary artery disease involving native coronary artery of native heart without angina pectoris  Alirocumab (PRALUENT) 75 MG/ML Solution Pen-injector    COMP METABOLIC PANEL    LIPID PANEL   6. Statin intolerance  Alirocumab (PRALUENT) 75 MG/ML Solution Pen-injector    COMP METABOLIC PANEL    LIPID PANEL   7. Medication intolerance BetaBlocker, ACE-I, ARB         Medical Decision Making:  Today's Assessment / Status / Plan:   I spent a significant amount of time discussing to patient about risk reduction in the setting of prior coronary artery disease with PCI and stenting in the coronary system.  I explained to him that it is beneficial for us to treat his LDL along with other risk factors aggressively because of prior history of PCI and stenting.  At this time, patient is open to PCSK9 inhibitor therapy.  We will start that therapy for now.  He does not want to try beta blockers, his inhibitors or ARB so spironolactone. He understands the risks.    I will see patient back in clinic with lab tests and studies results in 3 months.    I thank you Nina for referring patient to our Cardiology Clinic today.

## 2018-07-31 ENCOUNTER — TELEPHONE (OUTPATIENT)
Dept: CARDIOLOGY | Facility: MEDICAL CENTER | Age: 73
End: 2018-07-31

## 2018-07-31 NOTE — TELEPHONE ENCOUNTER
Left message for patient reminding him to get labs done prior to 8/10 appt with TT.   Confirmed SM location as well.

## 2018-08-07 ENCOUNTER — HOSPITAL ENCOUNTER (OUTPATIENT)
Dept: LAB | Facility: MEDICAL CENTER | Age: 73
End: 2018-08-07
Attending: INTERNAL MEDICINE
Payer: MEDICARE

## 2018-08-07 DIAGNOSIS — Z78.9 STATIN INTOLERANCE: ICD-10-CM

## 2018-08-07 DIAGNOSIS — I25.10 CORONARY ARTERY DISEASE INVOLVING NATIVE CORONARY ARTERY OF NATIVE HEART WITHOUT ANGINA PECTORIS: ICD-10-CM

## 2018-08-07 DIAGNOSIS — Z79.899 HIGH RISK MEDICATION USE: ICD-10-CM

## 2018-08-07 DIAGNOSIS — Z95.5 STENTED CORONARY ARTERY: ICD-10-CM

## 2018-08-07 DIAGNOSIS — I10 HTN (HYPERTENSION), MALIGNANT: ICD-10-CM

## 2018-08-07 LAB
ALBUMIN SERPL BCP-MCNC: 4.7 G/DL (ref 3.2–4.9)
ALBUMIN/GLOB SERPL: 2 G/DL
ALP SERPL-CCNC: 48 U/L (ref 30–99)
ALT SERPL-CCNC: 21 U/L (ref 2–50)
ANION GAP SERPL CALC-SCNC: 4 MMOL/L (ref 0–11.9)
AST SERPL-CCNC: 20 U/L (ref 12–45)
BILIRUB SERPL-MCNC: 0.8 MG/DL (ref 0.1–1.5)
BUN SERPL-MCNC: 21 MG/DL (ref 8–22)
CALCIUM SERPL-MCNC: 9.6 MG/DL (ref 8.5–10.5)
CHLORIDE SERPL-SCNC: 104 MMOL/L (ref 96–112)
CO2 SERPL-SCNC: 30 MMOL/L (ref 20–33)
CREAT SERPL-MCNC: 0.99 MG/DL (ref 0.5–1.4)
GLOBULIN SER CALC-MCNC: 2.4 G/DL (ref 1.9–3.5)
GLUCOSE SERPL-MCNC: 96 MG/DL (ref 65–99)
POTASSIUM SERPL-SCNC: 4.1 MMOL/L (ref 3.6–5.5)
PROT SERPL-MCNC: 7.1 G/DL (ref 6–8.2)
SODIUM SERPL-SCNC: 138 MMOL/L (ref 135–145)

## 2018-08-07 PROCEDURE — 36415 COLL VENOUS BLD VENIPUNCTURE: CPT

## 2018-08-07 PROCEDURE — 80053 COMPREHEN METABOLIC PANEL: CPT

## 2018-08-10 ENCOUNTER — OFFICE VISIT (OUTPATIENT)
Dept: CARDIOLOGY | Facility: MEDICAL CENTER | Age: 73
End: 2018-08-10
Payer: MEDICARE

## 2018-08-10 VITALS
DIASTOLIC BLOOD PRESSURE: 80 MMHG | HEART RATE: 66 BPM | HEIGHT: 67 IN | SYSTOLIC BLOOD PRESSURE: 144 MMHG | OXYGEN SATURATION: 96 % | WEIGHT: 217 LBS | BODY MASS INDEX: 34.06 KG/M2

## 2018-08-10 DIAGNOSIS — I25.2 OLD MI (MYOCARDIAL INFARCTION): ICD-10-CM

## 2018-08-10 DIAGNOSIS — I25.10 CORONARY ARTERY DISEASE INVOLVING NATIVE CORONARY ARTERY OF NATIVE HEART WITHOUT ANGINA PECTORIS: ICD-10-CM

## 2018-08-10 DIAGNOSIS — Z95.5 STENTED CORONARY ARTERY: ICD-10-CM

## 2018-08-10 DIAGNOSIS — Z79.899 HIGH RISK MEDICATION USE: ICD-10-CM

## 2018-08-10 DIAGNOSIS — E66.9 OBESITY (BMI 30.0-34.9): ICD-10-CM

## 2018-08-10 DIAGNOSIS — E78.5 DYSLIPIDEMIA: ICD-10-CM

## 2018-08-10 DIAGNOSIS — I10 HTN (HYPERTENSION), MALIGNANT: ICD-10-CM

## 2018-08-10 DIAGNOSIS — Z78.9 STATIN INTOLERANCE: ICD-10-CM

## 2018-08-10 DIAGNOSIS — Z78.9 MEDICATION INTOLERANCE: ICD-10-CM

## 2018-08-10 PROCEDURE — 99214 OFFICE O/P EST MOD 30 MIN: CPT | Performed by: INTERNAL MEDICINE

## 2018-08-10 ASSESSMENT — ENCOUNTER SYMPTOMS
CHILLS: 0
HEADACHES: 0
DEPRESSION: 0
BRUISES/BLEEDS EASILY: 0
DIZZINESS: 0
EYE PAIN: 0
SENSORY CHANGE: 0
FEVER: 0
BLURRED VISION: 0
DOUBLE VISION: 0
WEIGHT LOSS: 0
MYALGIAS: 0
SHORTNESS OF BREATH: 0
PALPITATIONS: 0
BLOOD IN STOOL: 0
CLAUDICATION: 0
ABDOMINAL PAIN: 0
COUGH: 0
SPEECH CHANGE: 0
LOSS OF CONSCIOUSNESS: 0
ORTHOPNEA: 0
NAUSEA: 0
EYE DISCHARGE: 0
FALLS: 0
HALLUCINATIONS: 0
PND: 0
VOMITING: 0

## 2018-08-10 NOTE — PROGRESS NOTES
"Chief Complaint   Patient presents with   • Coronary Artery Disease       Subjective:   Alexey Caballero is a 72 y.o. male who presents today for cardiac care and management due to coronary arterial disease status post 2 stents placed in coronary system unknown distribution in 2007 in [place], hypertension, hyperlipidemia. Patient did have transthoracic echocardiogram which showed normal LV function with no significant valvular disease.     Over the years, patient was not able to tolerate beta blockers, ace inhibitors, ARB's, he is only on aspirin at this time.     He has severe allergy to statin because of myalgia. Patient became incapacitated physically. At this time he does not want to try any more statins.    He was not able to tolerate PCSK9 inhibitor due to \"not feeling well\".    Past Medical History:   Diagnosis Date   • Allergy    • Arthritis    • Blood transfusion without reported diagnosis    • Cancer (HCC)    • Heart attack (HCC)      Past Surgical History:   Procedure Laterality Date   • ATHROPLASTY     • COLON RESECTION     • OPEN REDUCTION       Family History   Problem Relation Age of Onset   • Arthritis Father    • Heart Disease Father    • Hyperlipidemia Father      Social History     Social History   • Marital status: Single     Spouse name: N/A   • Number of children: N/A   • Years of education: N/A     Occupational History   • Not on file.     Social History Main Topics   • Smoking status: Former Smoker   • Smokeless tobacco: Never Used   • Alcohol use 0.0 oz/week   • Drug use: No   • Sexual activity: No     Other Topics Concern   • Not on file     Social History Narrative   • No narrative on file     Allergies   Allergen Reactions   • Tape Rash     Pt requests only paper tape as hypoallergenic tape gives him a severe rash.      Outpatient Encounter Prescriptions as of 8/10/2018   Medication Sig Dispense Refill   • therapeutic multivitamin-minerals (THERAGRAN-M) Tab Take 1 Tab by mouth every " "day.     • aspirin (ASA) 81 MG Chew Tab chewable tablet Take 81 mg by mouth every day.     • [DISCONTINUED] Alirocumab (PRALUENT) 75 MG/ML Solution Pen-injector Inject 75 mg as instructed every 14 days. 2 PEN 11     No facility-administered encounter medications on file as of 8/10/2018.      Review of Systems   Constitutional: Negative for chills, fever, malaise/fatigue and weight loss.   HENT: Negative for ear discharge, ear pain, hearing loss and nosebleeds.    Eyes: Negative for blurred vision, double vision, pain and discharge.   Respiratory: Negative for cough and shortness of breath.    Cardiovascular: Negative for chest pain, palpitations, orthopnea, claudication, leg swelling and PND.   Gastrointestinal: Negative for abdominal pain, blood in stool, melena, nausea and vomiting.   Genitourinary: Negative for dysuria and hematuria.   Musculoskeletal: Negative for falls, joint pain and myalgias.   Skin: Negative for itching and rash.   Neurological: Negative for dizziness, sensory change, speech change, loss of consciousness and headaches.   Endo/Heme/Allergies: Negative for environmental allergies. Does not bruise/bleed easily.   Psychiatric/Behavioral: Negative for depression, hallucinations and suicidal ideas.        Objective:   /80   Pulse 66   Ht 1.702 m (5' 7\")   Wt 98.4 kg (217 lb)   SpO2 96%   BMI 33.99 kg/m²     Physical Exam   Constitutional: He is oriented to person, place, and time. No distress.   HENT:   Head: Normocephalic and atraumatic.   Right Ear: External ear normal.   Left Ear: External ear normal.   Eyes: Right eye exhibits no discharge. Left eye exhibits no discharge.   Neck: No JVD present. No thyromegaly present.   Cardiovascular: Normal rate, regular rhythm, normal heart sounds and intact distal pulses.  Exam reveals no gallop and no friction rub.    No murmur heard.  Pulmonary/Chest: Breath sounds normal. No respiratory distress.   Abdominal: Bowel sounds are normal. He " exhibits no distension. There is no tenderness.   Musculoskeletal: He exhibits no edema or tenderness.   Neurological: He is alert and oriented to person, place, and time. No cranial nerve deficit.   Skin: Skin is warm and dry. He is not diaphoretic.   Psychiatric: He has a normal mood and affect. His behavior is normal.   Nursing note and vitals reviewed.      Assessment:     1. Coronary artery disease involving native coronary artery of native heart without angina pectoris     2. Stented coronary artery in 2007     3. HTN (hypertension), malignant     4. Statin intolerance     5. Dyslipidemia     6. Medication intolerance BetaBlocker, ACE-I, ARB     7. Old MI (myocardial infarction)     8. Obesity (BMI 30.0-34.9)     9. High risk medication use         Medical Decision Making:  Today's Assessment / Status / Plan:   I spent a significant amount of time discussing to patient about risk reduction in the setting of prior coronary artery disease with PCI and stenting in the coronary system.  I explained to him that it is beneficial for us to treat his LDL along with other risk factors aggressively because of prior history of PCI and stenting.  However, he is allergic to almost all medications and ok without taking anymore.  He does understand the risk.  I will not push for more.     I will see patient back in clinic with lab tests and studies results in 12 months.     I thank you Nina for referring patient to our Cardiology Clinic today.

## 2018-08-10 NOTE — LETTER
"     [facility]   [address]   [address]   [place] [address]  Phone: [phone]  Fax: [phone]              Alexey Caballero  1945    Encounter Date: 8/10/2018    Ricarda Aguilar M.D.          PROGRESS NOTE:  Chief Complaint   Patient presents with   • Coronary Artery Disease       Subjective:   Alexey Caballero is a 72 y.o. male who presents today for cardiac care and management due to coronary arterial disease status post 2 stents placed in coronary system unknown distribution in 2007 in [place], hypertension, hyperlipidemia. Patient did have transthoracic echocardiogram which showed normal LV function with no significant valvular disease.     Over the years, patient was not able to tolerate beta blockers, ace inhibitors, ARB's, he is only on aspirin at this time.     He has severe allergy to statin because of myalgia. Patient became incapacitated physically. At this time he does not want to try any more statins.    He was not able to tolerate PCSK9 inhibitor due to \"not feeling well\".    Past Medical History:   Diagnosis Date   • Allergy    • Arthritis    • Blood transfusion without reported diagnosis    • Cancer (HCC)    • Heart attack (HCC)      Past Surgical History:   Procedure Laterality Date   • ATHROPLASTY     • COLON RESECTION     • OPEN REDUCTION       Family History   Problem Relation Age of Onset   • Arthritis Father    • Heart Disease Father    • Hyperlipidemia Father      Social History     Social History   • Marital status: Single     Spouse name: N/A   • Number of children: N/A   • Years of education: N/A     Occupational History   • Not on file.     Social History Main Topics   • Smoking status: Former Smoker   • Smokeless tobacco: Never Used   • Alcohol use 0.0 oz/week   • Drug use: No   • Sexual activity: No     Other Topics Concern   • Not on file     Social History Narrative   • No narrative on file     " "    Allergies   Allergen Reactions   • Tape Rash     Pt requests only paper tape as hypoallergenic tape gives him a severe rash.      Outpatient Encounter Prescriptions as of 8/10/2018   Medication Sig Dispense Refill   • therapeutic multivitamin-minerals (THERAGRAN-M) Tab Take 1 Tab by mouth every day.     • Alirocumab (PRALUENT) 75 MG/ML Solution Pen-injector Inject 75 mg as instructed every 14 days. 2 PEN 11   • aspirin (ASA) 81 MG Chew Tab chewable tablet Take 81 mg by mouth every day.       No facility-administered encounter medications on file as of 8/10/2018.      Review of Systems   Constitutional: Negative for chills, fever, malaise/fatigue and weight loss.   HENT: Negative for ear discharge, ear pain, hearing loss and nosebleeds.    Eyes: Negative for blurred vision, double vision, pain and discharge.   Respiratory: Negative for cough and shortness of breath.    Cardiovascular: Negative for chest pain, palpitations, orthopnea, claudication, leg swelling and PND.   Gastrointestinal: Negative for abdominal pain, blood in stool, melena, nausea and vomiting.   Genitourinary: Negative for dysuria and hematuria.   Musculoskeletal: Negative for falls, joint pain and myalgias.   Skin: Negative for itching and rash.   Neurological: Negative for dizziness, sensory change, speech change, loss of consciousness and headaches.   Endo/Heme/Allergies: Negative for environmental allergies. Does not bruise/bleed easily.   Psychiatric/Behavioral: Negative for depression, hallucinations and suicidal ideas.        Objective:   /80   Pulse 66   Ht 1.702 m (5' 7\")   Wt 98.4 kg (217 lb)   SpO2 96%   BMI 33.99 kg/m²      Physical Exam   Constitutional: He is oriented to person, place, and time. No distress.   HENT:   Head: Normocephalic and atraumatic.   Right Ear: External ear normal.   Left Ear: External ear normal.   Eyes: Right eye exhibits no discharge. Left eye exhibits no discharge.   Neck: No JVD present. No " thyromegaly present.   Cardiovascular: Normal rate, regular rhythm, normal heart sounds and intact distal pulses.  Exam reveals no gallop and no friction rub.    No murmur heard.  Pulmonary/Chest: Breath sounds normal. No respiratory distress.   Abdominal: Bowel sounds are normal. He exhibits no distension. There is no tenderness.   Musculoskeletal: He exhibits no edema or tenderness.   Neurological: He is alert and oriented to person, place, and time. No cranial nerve deficit.   Skin: Skin is warm and dry. He is not diaphoretic.   Psychiatric: He has a normal mood and affect. His behavior is normal.   Nursing note and vitals reviewed.      Assessment:     1. Coronary artery disease involving native coronary artery of native heart without angina pectoris     2. Stented coronary artery in 2007     3. HTN (hypertension), malignant     4. Statin intolerance     5. Dyslipidemia     6. Medication intolerance BetaBlocker, ACE-I, ARB     7. Old MI (myocardial infarction)     8. Obesity (BMI 30.0-34.9)     9. High risk medication use         Medical Decision Making:  Today's Assessment / Status / Plan:   I spent a significant amount of time discussing to patient about risk reduction in the setting of prior coronary artery disease with PCI and stenting in the coronary system.  I explained to him that it is beneficial for us to treat his LDL along with other risk factors aggressively because of prior history of PCI and stenting.  However, he is allergic to almost all medications and ok without taking anymore.  He does understand the risk.  I will not push for more.     I will see patient back in clinic with lab tests and studies results in 12 months.     I thank you Nina for referring patient to our Cardiology Clinic today.      Nina Reddy, A.P.R.N.  [address]  [address]  [facility] [address]  VIA In Basket

## 2019-11-30 ENCOUNTER — HOSPITAL ENCOUNTER (EMERGENCY)
Facility: MEDICAL CENTER | Age: 74
End: 2019-11-30
Attending: EMERGENCY MEDICINE
Payer: MEDICARE

## 2019-11-30 VITALS
SYSTOLIC BLOOD PRESSURE: 135 MMHG | WEIGHT: 218.92 LBS | HEIGHT: 68 IN | BODY MASS INDEX: 33.18 KG/M2 | RESPIRATION RATE: 18 BRPM | DIASTOLIC BLOOD PRESSURE: 89 MMHG | OXYGEN SATURATION: 94 % | HEART RATE: 67 BPM

## 2019-11-30 DIAGNOSIS — S66.922A HAND LACERATION INVOLVING TENDON, LEFT, INITIAL ENCOUNTER: ICD-10-CM

## 2019-11-30 DIAGNOSIS — S61.412A HAND LACERATION INVOLVING TENDON, LEFT, INITIAL ENCOUNTER: ICD-10-CM

## 2019-11-30 PROCEDURE — 90715 TDAP VACCINE 7 YRS/> IM: CPT

## 2019-11-30 PROCEDURE — 700101 HCHG RX REV CODE 250

## 2019-11-30 PROCEDURE — 90471 IMMUNIZATION ADMIN: CPT

## 2019-11-30 PROCEDURE — 700111 HCHG RX REV CODE 636 W/ 250 OVERRIDE (IP)

## 2019-11-30 PROCEDURE — 99284 EMERGENCY DEPT VISIT MOD MDM: CPT

## 2019-11-30 RX ORDER — LIDOCAINE HYDROCHLORIDE 10 MG/ML
INJECTION, SOLUTION INFILTRATION; PERINEURAL
Status: COMPLETED
Start: 2019-11-30 | End: 2019-11-30

## 2019-11-30 RX ORDER — LIDOCAINE HYDROCHLORIDE 10 MG/ML
5 INJECTION, SOLUTION INFILTRATION; PERINEURAL ONCE
Status: COMPLETED | OUTPATIENT
Start: 2019-11-30 | End: 2019-11-30

## 2019-11-30 RX ORDER — CEPHALEXIN 500 MG/1
500 CAPSULE ORAL 4 TIMES DAILY
Qty: 20 CAP | Refills: 0 | Status: SHIPPED | OUTPATIENT
Start: 2019-11-30 | End: 2019-12-05

## 2019-11-30 RX ADMIN — CLOSTRIDIUM TETANI TOXOID ANTIGEN (FORMALDEHYDE INACTIVATED), CORYNEBACTERIUM DIPHTHERIAE TOXOID ANTIGEN (FORMALDEHYDE INACTIVATED), BORDETELLA PERTUSSIS TOXOID ANTIGEN (GLUTARALDEHYDE INACTIVATED), BORDETELLA PERTUSSIS FILAMENTOUS HEMAGGLUTININ ANTIGEN (FORMALDEHYDE INACTIVATED), BORDETELLA PERTUSSIS PERTACTIN ANTIGEN, AND BORDETELLA PERTUSSIS FIMBRIAE 2/3 ANTIGEN 0.5 ML: 5; 2; 2.5; 5; 3; 5 INJECTION, SUSPENSION INTRAMUSCULAR at 21:50

## 2019-11-30 RX ADMIN — LIDOCAINE HYDROCHLORIDE: 10 INJECTION, SOLUTION INFILTRATION; PERINEURAL at 21:25

## 2019-11-30 ASSESSMENT — PAIN SCALES - WONG BAKER: WONGBAKER_NUMERICALRESPONSE: HURTS JUST A LITTLE BIT

## 2019-12-01 NOTE — ED TRIAGE NOTES
Pt ambulates to triage w/ steady gait. A &O.    Pt states he slipped on ice at [employer] today and cut his hand on the ground- he assumes on a piece of ice. Pt is unsure of last tetanus shot but states its longer than 5 years ago.  Gauze placed to site as it is weeping.   Son at side.

## 2019-12-01 NOTE — ED PROVIDER NOTES
ED Provider Note    CHIEF COMPLAINT   Chief Complaint   Patient presents with   • Laceration     L palmar surface 2 cm       HPI   Alexey Caballero is a 74 y.o. male who presents with left hand laceration, palm proximal to the second digit suffered when he slipped on ice on the pavement.  He denies head or neck injury.  Patient denies other injury from the fall.  They tried to clean the wound at home, felt there was dirt tattooed within it and present for evaluation.  Last tetanus shot unknown.  No acute numbness or weakness.    REVIEW OF SYSTEMS   Musculoskeletal: No difficulty with flexion extension of his hand.  He has history of wrist fusion states diminished range of motion is unchanged  Neurologic: Denies head injury  Skin: Hand laceration      PAST MEDICAL HISTORY   Past Medical History:   Diagnosis Date   • Allergy    • Arthritis    • Blood transfusion without reported diagnosis    • Cancer (HCC)    • Heart attack (HCC)        FAMILY HISTORY  Family History   Problem Relation Age of Onset   • Arthritis Father    • Heart Disease Father    • Hyperlipidemia Father        SOCIAL HISTORY  Social History     Socioeconomic History   • Marital status: Single     Spouse name: Not on file   • Number of children: Not on file   • Years of education: Not on file   • Highest education level: Not on file   Occupational History   • Not on file   Social Needs   • Financial resource strain: Not on file   • Food insecurity:     Worry: Not on file     Inability: Not on file   • Transportation needs:     Medical: Not on file     Non-medical: Not on file   Tobacco Use   • Smoking status: Former Smoker   • Smokeless tobacco: Never Used   Substance and Sexual Activity   • Alcohol use: Yes     Alcohol/week: 0.0 oz   • Drug use: No   • Sexual activity: Never   Lifestyle   • Physical activity:     Days per week: Not on file     Minutes per session: Not on file   • Stress: Not on file   Relationships   • Social connections:      "Talks on phone: Not on file     Gets together: Not on file     Attends Hoah[employer] service: Not on file     Active member of club or organization: Not on file     Attends meetings of clubs or organizations: Not on file     Relationship status: Not on file   • Intimate partner violence:     Fear of current or ex partner: Not on file     Emotionally abused: Not on file     Physically abused: Not on file     Forced sexual activity: Not on file   Other Topics Concern   • Not on file   Social History Narrative   • Not on file       SURGICAL HISTORY  Past Surgical History:   Procedure Laterality Date   • ATHROPLASTY     • COLON RESECTION     • OPEN REDUCTION         CURRENT MEDICATIONS   Home Medications    **Home medications have not yet been reviewed for this encounter**         ALLERGIES   Allergies   Allergen Reactions   • Tape Rash     Pt requests only paper tape as hypoallergenic tape gives him a severe rash.        PHYSICAL EXAM  VITAL SIGNS: /89   Pulse 67   Resp 18   Ht 1.727 m (5' 8\")   Wt 99.3 kg (218 lb 14.7 oz)   SpO2 94%   BMI 33.29 kg/m²   Skin: Oblique superficial laceration into subcutaneous tissue non-gaping palm of left hand.  Less than 1 mm small abrasion adjacent to this near the second MP joint  Vascular: Intact distal capillary refill.   Musculoskeletal: Flexion extension of all fingers intact  Neurologic: Sensation is intact right hand    RADIOLOGY/PROCEDURES  Laceration of the right hand was prepped with Betadine, infiltrated 1% lidocaine.  Nursing staff irrigated the wound, you surgical scrub brush to help remove any loose impediments.  There is a small amount of dirt tattooed within the wound that was unable to be removed both with the surgical scrub brush as well as tip of the needle to help debride it.  What could be debrided was.      COURSE & MEDICAL DECISION MAKING  Pertinent Labs & Imaging studies reviewed. (See chart for details)  She is advised to wash the wound daily.  Patient " given precautions regarding signs and symptoms of infection and the need to return should this become infected.  Patient is placed on Keflex for 5 days.  Tetanus shot updated.  Patient discharged in good condition.  Wound is nonsuturable.    FINAL IMPRESSION     1. Hand laceration involving tendon, left, initial encounter      Palm             Electronically signed by: Alcides Moss, 11/30/2019 9:55 PM

## 2021-01-14 DIAGNOSIS — Z23 NEED FOR VACCINATION: ICD-10-CM

## 2021-10-12 ENCOUNTER — APPOINTMENT (OUTPATIENT)
Dept: CARDIOLOGY | Facility: MEDICAL CENTER | Age: 76
DRG: 066 | End: 2021-10-12
Attending: HOSPITALIST
Payer: MEDICARE

## 2021-10-12 ENCOUNTER — APPOINTMENT (OUTPATIENT)
Dept: RADIOLOGY | Facility: MEDICAL CENTER | Age: 76
DRG: 066 | End: 2021-10-12
Attending: HOSPITALIST
Payer: MEDICARE

## 2021-10-12 ENCOUNTER — APPOINTMENT (OUTPATIENT)
Dept: RADIOLOGY | Facility: MEDICAL CENTER | Age: 76
DRG: 066 | End: 2021-10-12
Attending: EMERGENCY MEDICINE
Payer: MEDICARE

## 2021-10-12 ENCOUNTER — HOSPITAL ENCOUNTER (INPATIENT)
Facility: MEDICAL CENTER | Age: 76
LOS: 4 days | DRG: 066 | End: 2021-10-16
Attending: EMERGENCY MEDICINE | Admitting: HOSPITALIST
Payer: MEDICARE

## 2021-10-12 DIAGNOSIS — I10 ESSENTIAL HYPERTENSION: ICD-10-CM

## 2021-10-12 DIAGNOSIS — I63.9 ACUTE CVA (CEREBROVASCULAR ACCIDENT) (HCC): ICD-10-CM

## 2021-10-12 PROBLEM — R47.01 APHASIA: Status: ACTIVE | Noted: 2021-10-12

## 2021-10-12 PROBLEM — I65.23 BILATERAL CAROTID ARTERY STENOSIS: Status: ACTIVE | Noted: 2021-10-12

## 2021-10-12 LAB
ABO + RH BLD: NORMAL
ABO GROUP BLD: NORMAL
ALBUMIN SERPL BCP-MCNC: 4.4 G/DL (ref 3.2–4.9)
ALBUMIN/GLOB SERPL: 1.8 G/DL
ALP SERPL-CCNC: 59 U/L (ref 30–99)
ALT SERPL-CCNC: 21 U/L (ref 2–50)
ANION GAP SERPL CALC-SCNC: 11 MMOL/L (ref 7–16)
APTT PPP: 34.4 SEC (ref 24.7–36)
AST SERPL-CCNC: 24 U/L (ref 12–45)
BASOPHILS # BLD AUTO: 0.7 % (ref 0–1.8)
BASOPHILS # BLD: 0.06 K/UL (ref 0–0.12)
BILIRUB SERPL-MCNC: 0.5 MG/DL (ref 0.1–1.5)
BLD GP AB SCN SERPL QL: NORMAL
BUN SERPL-MCNC: 15 MG/DL (ref 8–22)
CALCIUM SERPL-MCNC: 9.2 MG/DL (ref 8.5–10.5)
CHLORIDE SERPL-SCNC: 102 MMOL/L (ref 96–112)
CO2 SERPL-SCNC: 23 MMOL/L (ref 20–33)
CREAT SERPL-MCNC: 0.99 MG/DL (ref 0.5–1.4)
EOSINOPHIL # BLD AUTO: 0.39 K/UL (ref 0–0.51)
EOSINOPHIL NFR BLD: 4.3 % (ref 0–6.9)
ERYTHROCYTE [DISTWIDTH] IN BLOOD BY AUTOMATED COUNT: 45.2 FL (ref 35.9–50)
EST. AVERAGE GLUCOSE BLD GHB EST-MCNC: 120 MG/DL
GLOBULIN SER CALC-MCNC: 2.5 G/DL (ref 1.9–3.5)
GLUCOSE SERPL-MCNC: 109 MG/DL (ref 65–99)
HBA1C MFR BLD: 5.8 % (ref 4–5.6)
HCT VFR BLD AUTO: 44.2 % (ref 42–52)
HGB BLD-MCNC: 14.9 G/DL (ref 14–18)
IMM GRANULOCYTES # BLD AUTO: 0.02 K/UL (ref 0–0.11)
IMM GRANULOCYTES NFR BLD AUTO: 0.2 % (ref 0–0.9)
INR PPP: 1.41 (ref 0.87–1.13)
LV EJECT FRACT  99904: 60
LV EJECT FRACT MOD 2C 99903: 56.34
LV EJECT FRACT MOD 4C 99902: 59.04
LV EJECT FRACT MOD BP 99901: 57.01
LYMPHOCYTES # BLD AUTO: 3.32 K/UL (ref 1–4.8)
LYMPHOCYTES NFR BLD: 37 % (ref 22–41)
MCH RBC QN AUTO: 32.3 PG (ref 27–33)
MCHC RBC AUTO-ENTMCNC: 33.7 G/DL (ref 33.7–35.3)
MCV RBC AUTO: 95.9 FL (ref 81.4–97.8)
MONOCYTES # BLD AUTO: 0.7 K/UL (ref 0–0.85)
MONOCYTES NFR BLD AUTO: 7.8 % (ref 0–13.4)
NEUTROPHILS # BLD AUTO: 4.49 K/UL (ref 1.82–7.42)
NEUTROPHILS NFR BLD: 50 % (ref 44–72)
NRBC # BLD AUTO: 0 K/UL
NRBC BLD-RTO: 0 /100 WBC
PLATELET # BLD AUTO: 241 K/UL (ref 164–446)
PMV BLD AUTO: 12.4 FL (ref 9–12.9)
POTASSIUM SERPL-SCNC: 3.6 MMOL/L (ref 3.6–5.5)
PROT SERPL-MCNC: 6.9 G/DL (ref 6–8.2)
PROTHROMBIN TIME: 16.8 SEC (ref 12–14.6)
RBC # BLD AUTO: 4.61 M/UL (ref 4.7–6.1)
RH BLD: NORMAL
SODIUM SERPL-SCNC: 136 MMOL/L (ref 135–145)
TROPONIN T SERPL-MCNC: 9 NG/L (ref 6–19)
WBC # BLD AUTO: 9 K/UL (ref 4.8–10.8)

## 2021-10-12 PROCEDURE — 86850 RBC ANTIBODY SCREEN: CPT

## 2021-10-12 PROCEDURE — 99223 1ST HOSP IP/OBS HIGH 75: CPT | Mod: AI | Performed by: HOSPITALIST

## 2021-10-12 PROCEDURE — 86900 BLOOD TYPING SEROLOGIC ABO: CPT

## 2021-10-12 PROCEDURE — 99223 1ST HOSP IP/OBS HIGH 75: CPT | Mod: GC | Performed by: PSYCHIATRY & NEUROLOGY

## 2021-10-12 PROCEDURE — 83036 HEMOGLOBIN GLYCOSYLATED A1C: CPT

## 2021-10-12 PROCEDURE — 700102 HCHG RX REV CODE 250 W/ 637 OVERRIDE(OP): Performed by: HOSPITALIST

## 2021-10-12 PROCEDURE — 93306 TTE W/DOPPLER COMPLETE: CPT | Mod: 26 | Performed by: INTERNAL MEDICINE

## 2021-10-12 PROCEDURE — 84484 ASSAY OF TROPONIN QUANT: CPT

## 2021-10-12 PROCEDURE — 92610 EVALUATE SWALLOWING FUNCTION: CPT

## 2021-10-12 PROCEDURE — 99285 EMERGENCY DEPT VISIT HI MDM: CPT

## 2021-10-12 PROCEDURE — 80053 COMPREHEN METABOLIC PANEL: CPT

## 2021-10-12 PROCEDURE — 93306 TTE W/DOPPLER COMPLETE: CPT

## 2021-10-12 PROCEDURE — 70496 CT ANGIOGRAPHY HEAD: CPT | Mod: MG

## 2021-10-12 PROCEDURE — A9270 NON-COVERED ITEM OR SERVICE: HCPCS | Performed by: HOSPITALIST

## 2021-10-12 PROCEDURE — 96372 THER/PROPH/DIAG INJ SC/IM: CPT

## 2021-10-12 PROCEDURE — 0042T CT-CEREBRAL PERFUSION ANALYSIS: CPT

## 2021-10-12 PROCEDURE — 70450 CT HEAD/BRAIN W/O DYE: CPT | Mod: MG

## 2021-10-12 PROCEDURE — 85730 THROMBOPLASTIN TIME PARTIAL: CPT

## 2021-10-12 PROCEDURE — 70498 CT ANGIOGRAPHY NECK: CPT | Mod: MG

## 2021-10-12 PROCEDURE — 86901 BLOOD TYPING SEROLOGIC RH(D): CPT

## 2021-10-12 PROCEDURE — 770020 HCHG ROOM/CARE - TELE (206)

## 2021-10-12 PROCEDURE — 700111 HCHG RX REV CODE 636 W/ 250 OVERRIDE (IP): Performed by: HOSPITALIST

## 2021-10-12 PROCEDURE — 70551 MRI BRAIN STEM W/O DYE: CPT | Mod: ME

## 2021-10-12 PROCEDURE — 85610 PROTHROMBIN TIME: CPT

## 2021-10-12 PROCEDURE — 71045 X-RAY EXAM CHEST 1 VIEW: CPT

## 2021-10-12 PROCEDURE — 700117 HCHG RX CONTRAST REV CODE 255: Performed by: EMERGENCY MEDICINE

## 2021-10-12 PROCEDURE — 85025 COMPLETE CBC W/AUTO DIFF WBC: CPT

## 2021-10-12 RX ORDER — M-VIT,TX,IRON,MINS/CALC/FOLIC 27MG-0.4MG
1 TABLET ORAL DAILY
Status: DISCONTINUED | OUTPATIENT
Start: 2021-10-12 | End: 2021-10-16 | Stop reason: HOSPADM

## 2021-10-12 RX ORDER — ASPIRIN 300 MG/1
300 SUPPOSITORY RECTAL DAILY
Status: DISCONTINUED | OUTPATIENT
Start: 2021-10-12 | End: 2021-10-13

## 2021-10-12 RX ORDER — ACETAMINOPHEN 325 MG/1
650 TABLET ORAL EVERY 6 HOURS PRN
Status: DISCONTINUED | OUTPATIENT
Start: 2021-10-12 | End: 2021-10-16 | Stop reason: HOSPADM

## 2021-10-12 RX ORDER — POLYETHYLENE GLYCOL 3350 17 G/17G
1 POWDER, FOR SOLUTION ORAL
Status: DISCONTINUED | OUTPATIENT
Start: 2021-10-12 | End: 2021-10-16 | Stop reason: HOSPADM

## 2021-10-12 RX ORDER — AMOXICILLIN 250 MG
2 CAPSULE ORAL 2 TIMES DAILY
Status: DISCONTINUED | OUTPATIENT
Start: 2021-10-12 | End: 2021-10-16 | Stop reason: HOSPADM

## 2021-10-12 RX ORDER — ASPIRIN 325 MG
325 TABLET ORAL DAILY
Status: DISCONTINUED | OUTPATIENT
Start: 2021-10-12 | End: 2021-10-13

## 2021-10-12 RX ORDER — ASPIRIN 81 MG/1
324 TABLET, CHEWABLE ORAL DAILY
Status: DISCONTINUED | OUTPATIENT
Start: 2021-10-12 | End: 2021-10-13

## 2021-10-12 RX ORDER — ATORVASTATIN CALCIUM 80 MG/1
80 TABLET, FILM COATED ORAL EVERY EVENING
Status: DISCONTINUED | OUTPATIENT
Start: 2021-10-12 | End: 2021-10-16 | Stop reason: HOSPADM

## 2021-10-12 RX ORDER — CHOLECALCIFEROL (VITAMIN D3) 50 MCG
2000 TABLET ORAL
COMMUNITY

## 2021-10-12 RX ORDER — BISACODYL 10 MG
10 SUPPOSITORY, RECTAL RECTAL
Status: DISCONTINUED | OUTPATIENT
Start: 2021-10-12 | End: 2021-10-16 | Stop reason: HOSPADM

## 2021-10-12 RX ADMIN — ENOXAPARIN SODIUM 40 MG: 40 INJECTION SUBCUTANEOUS at 18:20

## 2021-10-12 RX ADMIN — IOHEXOL 40 ML: 350 INJECTION, SOLUTION INTRAVENOUS at 06:18

## 2021-10-12 RX ADMIN — ASPIRIN 300 MG: 300 SUPPOSITORY RECTAL at 10:30

## 2021-10-12 RX ADMIN — IOHEXOL 80 ML: 350 INJECTION, SOLUTION INTRAVENOUS at 06:19

## 2021-10-12 ASSESSMENT — ENCOUNTER SYMPTOMS
ORTHOPNEA: 0
HEMOPTYSIS: 0
NAUSEA: 0
SPEECH CHANGE: 1
COUGH: 0
CHILLS: 0
SPUTUM PRODUCTION: 0
PALPITATIONS: 0
DIZZINESS: 0
VOMITING: 0
FOCAL WEAKNESS: 1

## 2021-10-12 ASSESSMENT — LIFESTYLE VARIABLES: DO YOU DRINK ALCOHOL: YES

## 2021-10-12 ASSESSMENT — PAIN SCALES - WONG BAKER: WONGBAKER_NUMERICALRESPONSE: DOESN'T HURT AT ALL

## 2021-10-12 ASSESSMENT — PAIN DESCRIPTION - PAIN TYPE: TYPE: ACUTE PAIN

## 2021-10-12 NOTE — DISCHARGE PLANNING
[facility] Transitional Care Coordination    Referral from:  Grover Martinez  Insurance Provider on Facesheet: Medicare  Potential Rehab Diagnosis: Stroke    Chart review indicates patient may need on going medical management and may have therapy needs to possibly meet inpatient rehab facility criteria with the goal of returning to community.    D/C support: TBD     Physiatry consultation: Pended per protocol.     Stroke - pending therapy evals. Waiting on additional information to determine appropriateness for acute inpatient rehabilitation. Will continue to follow.      Thank you for the referral.

## 2021-10-12 NOTE — ASSESSMENT & PLAN NOTE
Patient has acute onset of focal neurologic symptoms  CT imaging is unremarkable with the exception of carotid stenosis  Mostly diagnosis is a stroke  Continuous hemodynamic monitoring on telemetry  MRI brain indicative of stroke  Echocardiogram without septal abnormality  Continue high intensity statin  Continue antiplatelet therapy  Continue PT/OT/Speech  Appreciate physiatry consult, good candidate per their evaluation. Awaiting bed availability. Plan for transfer tomorrow.  Follow up in stroke bridge clinic.

## 2021-10-12 NOTE — ED PROVIDER NOTES
"ED Provider Note    CHIEF COMPLAINT  possible stroke    HPI  Alexey Caballero is a 76 y.o. male who presents to their his apartment with a possible stroke. History initially obtained at the charge desk from paramedics. Patient was in usual state of health yesterday went to bed normal at around 10 PM. Woke up at 0445. When he tried to ambulate he noticed that his right leg was weak and he actually had to lower himself to the ground a couple times. He called the family members he was noted to have speech abnormalities. Paramedics were called in the patient was brought to the ER. Patient denies any injuries from going to the ground. Denies any in his head. Denies headache.  He has not had fevers or recent illness.  Nothing like this is happened to him before in the past.    REVIEW OF SYSTEMS  As per HPI, otherwise a 10 point review of systems is negative    PAST MEDICAL HISTORY  Past Medical History:   Diagnosis Date   • Allergy    • Arthritis    • Blood transfusion without reported diagnosis    • Cancer (HCC)    • Heart attack (HCC)        Family history  No pertinent family medical history    SOCIAL HISTORY  Social History     Tobacco Use   • Smoking status: Former Smoker   • Smokeless tobacco: Never Used   Substance Use Topics   • Alcohol use: Yes     Alcohol/week: 0.0 oz   • Drug use: No       SURGICAL HISTORY  Past Surgical History:   Procedure Laterality Date   • ATHROPLASTY     • COLON RESECTION     • OPEN REDUCTION         CURRENT MEDICATIONS  Home Medications    **Home medications have not yet been reviewed for this encounter**         ALLERGIES  Allergies   Allergen Reactions   • Tape Rash     Pt requests only paper tape as hypoallergenic tape gives him a severe rash.        PHYSICAL EXAM  VITAL SIGNS: BP (!) 165/90   Pulse 62   Temp 36.6 °C (97.9 °F) (Temporal)   Resp 15   Ht 1.727 m (5' 7.99\")   Wt 99.3 kg (218 lb 14.7 oz)   SpO2 91%   BMI 33.29 kg/m²    Constitutional: Awake and alert  HENT: " Normal inspection  Eyes: Normal inspection  Neck: Grossly normal range of motion.  Cardiovascular: Normal heart rate, Normal rhythm.  Symmetric peripheral pulses.   Thorax & Lungs: No respiratory distress, No wheezing, No rales, No rhonchi, No chest tenderness.   Abdomen: Bowel sounds normal, soft, non-distended, nontender, no mass  Skin: No obvious rash.  Back: No tenderness, No CVA tenderness.   Extremities: No clubbing, cyanosis, edema, no Homans or cords.  Neurologic:      NIHSS    1a. Level of Consciousness  0. Alert. Keenly Responsive  1. Not Alert but arousable by minor stimulation  2. Not Alert. Requires repeated stimulation  3. Responds only w reflex motor/autonomic effects or totally unresponsive  Score:0  1b. Level of Consciousness: ask month and age  0. Answers both questions correctly  1. Answers one question correctly  2. Answers neither question correctly  Score: 0  1c. Level of Consciousness: ask to open and close eyes/make a fist with non-paretic hand  0. Performs both tasks correctly  1. Performs one task correctly  2. Performs neither task correctly  Score: 0  2. Best gaze: Horizontal eye movement             0 Normal             1 Partial gaze palsy. Forced deviation or total palsy not present             2 Forced deviation. Not overcome by oculo-cephalic maneuver            Score: 0  3. Visual Fields             0 No loss             1 Partial hemianopia             2 Complete hemianopia             3 Bilateral hemianopia. Blindness             Score:0  4. Facial Palsy           0 Normal symmetrical movements           1 Minor paralysis           2 Partial paralysis           3 Complete paralysis           Score: 0  5. Motor Arm Right and Left           0 No drift           1 Drift but does not hit bed           2 Some effort against gravity. Hits bed           3 No effort against gravity           4 No movement          UN Amputation/joint fusion/explain          Score R: 1          Score L:  0  6. Motor Leg: Right and Left          0 No drift          1 Drift but does not hit bed          2 Some effort against gravity. Hits bed          3 No effort against gravity          4 No movement          UN Amputation/joint fusion/explain          Score R :0          Score L :0  7. Limb Ataxia. Finger to nose/ shin-heel         0 Absent         1 Present in one limb         2 Present in two limbs         UN Amputation/joint fusion explain         Score:2  8. Sensory. Pinprick         0 Normal. No sensory loss         1 Mild to moderate sensory loss         2 Severe or total sensory loss         Score: 0  9. Best language. Show the kitchen/cookie picture         0 No aphasia         1 Mild to moderate aphasia         2 Severe aphasia         3 Mute. Global aphasia         Score:1  10. Dysarthria. Show the reading list         0 Normal         1 Mild to moderate dysarthria         2 Severe dysarthria         3 Intubated or other physical barrier         Score: 0  11. Extinction and Inattention         0 No abnormality         1 Extinction to simultaneous stimulation         2 Profound Ritesh-inattention or extinction         Score: 0        Total score: 4  Psychiatric: Normal for situation    RADIOLOGY/PROCEDURES  DX-CHEST-PORTABLE (1 VIEW)   Final Result      Mild cardiomegaly      CT-CTA NECK WITH & W/O-POST PROCESSING   Final Result      1.  Left carotid arterial bifurcation atherosclerotic plaque results in moderate (50-70% diameter) stenosis.      2.  Right carotid arterial bifurcation atherosclerotic plaque results in mild (less than 50% diameter) stenosis.      3.  Patent vertebral arteries.      CT-CTA HEAD WITH & W/O-POST PROCESS   Final Result      No evidence of intracranial vascular occlusion or aneurysm.      CT-CEREBRAL PERFUSION ANALYSIS   Final Result      1.  Cerebral blood flow less than 30% likely representing completed infarct = 0 mL.      2.  T Max more than 6 seconds likely representing  combination of completed infarct and ischemia = 0 mL.      3.  Mismatched volume likely representing ischemic brain/penumbra = None      4.  Please note that the cerebral perfusion was performed on the limited brain tissue around the basal ganglia region. Infarct/ischemia outside the CT perfusion sections can be missed in this study.      CT-HEAD W/O   Final Result      No evidence of acute intracranial process.      MR-BRAIN-W/O    (Results Pending)   EC-ECHOCARDIOGRAM COMPLETE W/O CONT    (Results Pending)        Imaging is interpreted by radiologist    Labs:  Results for orders placed or performed during the hospital encounter of 10/12/21   CBC WITH DIFFERENTIAL   Result Value Ref Range    WBC 9.0 4.8 - 10.8 K/uL    RBC 4.61 (L) 4.70 - 6.10 M/uL    Hemoglobin 14.9 14.0 - 18.0 g/dL    Hematocrit 44.2 42.0 - 52.0 %    MCV 95.9 81.4 - 97.8 fL    MCH 32.3 27.0 - 33.0 pg    MCHC 33.7 33.7 - 35.3 g/dL    RDW 45.2 35.9 - 50.0 fL    Platelet Count 241 164 - 446 K/uL    MPV 12.4 9.0 - 12.9 fL    Neutrophils-Polys 50.00 44.00 - 72.00 %    Lymphocytes 37.00 22.00 - 41.00 %    Monocytes 7.80 0.00 - 13.40 %    Eosinophils 4.30 0.00 - 6.90 %    Basophils 0.70 0.00 - 1.80 %    Immature Granulocytes 0.20 0.00 - 0.90 %    Nucleated RBC 0.00 /100 WBC    Neutrophils (Absolute) 4.49 1.82 - 7.42 K/uL    Lymphs (Absolute) 3.32 1.00 - 4.80 K/uL    Monos (Absolute) 0.70 0.00 - 0.85 K/uL    Eos (Absolute) 0.39 0.00 - 0.51 K/uL    Baso (Absolute) 0.06 0.00 - 0.12 K/uL    Immature Granulocytes (abs) 0.02 0.00 - 0.11 K/uL    NRBC (Absolute) 0.00 K/uL   PROTHROMBIN TIME   Result Value Ref Range    PT 16.8 (H) 12.0 - 14.6 sec    INR 1.41 (H) 0.87 - 1.13   APTT   Result Value Ref Range    APTT 34.4 24.7 - 36.0 sec   COD (ADULT)   Result Value Ref Range    ABO Grouping Only A     Rh Grouping Only POS     Antibody Screen-Cod NEG          COURSE & MEDICAL DECISION MAKING  She presents to the ER with symptoms concerning for wake-up stroke.  Seen  immediately upon arrival.  He is not an alteplase candidate because of lack of definitive timeline plate.  He was transported emergently to CT scan.    Return to the room.  NIH performed.  He has persistent symptoms.  He has right-sided ataxia appears to have a minimal aphasia with questionable weakness in the right arm.  Neurology evaluated images.  He does not have any large vessel occlusion amenable to intervention.  Dr. Lopez advised admit for MRI and vascular consultation.  Stated formal neurology consult would follow.  Paged hospitalist.  Aspirin was ordered.    Discussed case with Dr. Duarte.  He will admit the patient.    FINAL IMPRESSION  1.  Acute cerebrovascular accident      This dictation was created using voice recognition software. The accuracy of the dictation is limited to the abilities of the software.  The nursing notes were reviewed and certain aspects of this information were incorporated into this note.      Electronically signed by: Yaron Murrell M.D., 10/12/2021 6:06 AM

## 2021-10-12 NOTE — PROGRESS NOTES
Hospital Medicine Triage Officer Note    Date of Service  10/12/2021    Spoke with EDP, patient being worked up for stroke and possible vascular surgery for symptomatic carotid artery stenosis as per Neurology. Does not meet CDU criteria for admission. Spoke with Dr. Rodriguez for admission evaluation, he has accepted and will see patient for possible admission.

## 2021-10-12 NOTE — ED TRIAGE NOTES
"Chief Complaint   Patient presents with   • Possible Stroke     PT BIB [facility] last seen normal at 2230 and awoke with heavy right side and slurred speech.  PT with several GLF this AM     Blood Pressure 116/88   Pulse 71   Temperature 36.6 °C (97.9 °F) (Temporal)   Respiration 14   Height 1.727 m (5' 7.99\")   Weight 99.3 kg (218 lb 14.7 oz)   Oxygen Saturation 91%   Body Mass Index 33.29 kg/m²     "

## 2021-10-12 NOTE — DISCHARGE PLANNING
Medical Social Work    Referral: Stroke IR    Intervention: Pt is a 76 year old male brought in by JEOVANY from home for possible stroke.  Pt is Alexey Caballero (: 1945).  Per medics pt's son is on his way.  Per chart pt's son Guillaume can be reached at: [phone] or [phone].    Plan: SW will follow as needed.

## 2021-10-12 NOTE — ASSESSMENT & PLAN NOTE
History of coronary artery disease with myocardial infarction 2007  Patient received 2 stents in  [place]  Continue aspirin, start atorvastatin

## 2021-10-12 NOTE — ASSESSMENT & PLAN NOTE
Patient was previously with a statin but stopped taking in the last few years  Continue atorvastatin 80 for acute stroke

## 2021-10-12 NOTE — PROGRESS NOTES
Neurology note      76M with HTN, hyperlipidemia, CAD s/p distant stents, presenting with RLE weakness and speech changes upon awakening.  His last known well was 1000PM yesterday.    Stroke protocol CT- CTH revealing no hemorrhage or large territory stroke.  CT angiogram of head with no large vessel occlusion or critical stenoses.  Accordingly, CT perfusion without focal perfusion defect.  CT angiogram of neck with 50-60% stenosis of L ICA with irregular soft/calcified plaque, but otherwise no critical stenoses.    Assessment:  No LVO amenable to endovascular therapy.  No clearly identified evolving ischemia with associated ischemic penumbra to warrant off-label IV-thrombolytics.  Recommend continuing ASA therapy.  Admit for MRI brain without contrast.  Stroke labs- HgbA1c and LDL.   Start statin therapy.  Exam stable with SBP in 110s, and there is no identified ischemic penumbra, so do not recommend permissive hypertension or  blood pressure augmentation.  If MRI reveals stroke burden in L hemisphere- atheroembolic most likely etiology given moderately stenotic, irregular plaque just distal to bifurcation.  Consider vascular surgery consult for symptomatic carotid disease, but I would recommend completing embolic workup with TTE and long-term cardiac monitoring.  Formal Neurology consult to follow.    Wil Pinto MD  Neurology

## 2021-10-12 NOTE — THERAPY
Speech Language Pathology   Clinical Swallow Evaluation     Patient Name: Alexey Caballero  AGE:  76 y.o., SEX:  male  Medical Record #: [id number]  Today's Date: 10/12/2021     Precautions  Precautions: Fall Risk, Swallow Precautions ( See Comments)    Assessment    Patient is 77 y/o M admitted 10/11/21 with L CVA. PMHx includes MI, CAD.     MRI Brain 10/12: Small foci of acute infarction in the left frontal subcortical region, left posterior limb internal capsule/corona radiata and left occipital white matter. Remote left medial parietal, left coronal radiata and left basal ganglia lacunar infarcts.    CXR 10/12: There is no evidence of focal consolidation or evidence of pulmonary edema.    Clinical swallow evaluation was completed at bedside. Pt was A&Ox4, cooperative. Pt denies a hx of dysphagia, though due to some missing teeth, does avoid certain food textures (e.g., crusty bread, hard nuts/chips). [facility] notable for R facial droop, slight R lingual weakness and R velar elevation impairment (only L side elevated). Pt was presented with ice chips, thins via tsp/cup, mildly thick liquids via tsp/cup, liquidized, pureed, minced/moist, soft/bite sized, regular solids. Pt was able to self-feed given extra time to use his dominant (affected) RUE.    Clinical signs of oral dysphagia include trace R anterior bolus loss with thins and slowed mastication, c/w R labiofacial weakness s/p L CVA. However, pt had good oral clearance with no oral pocketing, timely pharyngeal swallow response and no overt s/sx of aspiration. Vocal quality remained clear. Pt appears appropriate to initiate an oral diet.     Recommendations:  1. Initiate a diet of Regular Solids - Easy to Chew (EC7) and Thin Liquids (TN0)    -pills whole w/ thins  2. SLP to follow and complete speech-language/cognitive-linguistic evaluation as indicated.      Plan    Recommend Speech Therapy 3 times per week until therapy goals are met for the  following treatments:  Dysphagia Training and Patient / Family / Caregiver Education.    Discharge Recommendations: Anticipate that the patient will have no further speech therapy needs after discharge from the hospital     Objective       10/12/21 1257   Oral Motor Eval    Is Patient Able to Complete Oral Motor Eval Yes but Impaired   Labial Function   Labial Structure At Rest Minimal  (R facial droop)   Labial Vowel Production / I /, / U / Minimal   Labial Sequence / I /, / U / Minimal   Frown, Pucker Within Functional Limits   Lingual Function   Lingual Structure At Rest Within Functional Limits   Lingual Protrude Within Functional Limits   Lingual Retract Within Functional Limits   Elevate In Mouth Within Functional Limits   Elevate Outside Mouth Within Functional Limits   Lateralization Minimal Right;No Impairment Left   Lick Lips (Circular) Within Functional Limits   / Pa / 5X's Within Functional Limits   / Ta / 5X's Within Functional Limits   / Ka / 5X's Within Functional Limits   / [place] / 5X's Within Functional Limits   Jaw   Jaw Structure At Rest Within Functional Limits   Bite ([place]) Within Functional Limits   Chew (Rotary) Within Functional Limits   Velar Function   Velar Structure At Rest Moderate  (L palate elevates; R does not)   Laryngeal Function   Voice Quality Within Functional Limits   Volutional Cough Within Functional Limits   Excursion Upon Swallow Complete   Oral Food Presentation   Ice Chips Within Functional Limits   Single Swallow Thin (0) Within Functional Limits   Serial Swallow Thin (0) Within Functional Limits   Liquidised (3) Within Functional Limits   Pureed (4) Within Functional Limits   Soft & Bite-Sized (6) - (Dysphagia III) Within Functional Limits   Regular (7) Within Functional Limits   Dysphagia Strategies / Recommendations   Compensatory Strategies Head of Bed 90 Degrees During Eating / Drinking;Single Sips / Bites   Diet / Liquid Recommendation Regular - Easy to Chew  (7);Thin (0)   Medication Administration  Whole with Liquid Wash   Short Term Goals   Short Term Goal # 1 Patient will consume meals of regular easy to chew solids and thin liquids with no s/sx of aspiration with independent use of safe swallow precautions.

## 2021-10-12 NOTE — CONSULTS
Neurology Initial Consult H&P    Referring Physician: Grover Rodriguez M.D.    Chief Complaint   Patient presents with   • Possible Stroke     PT BIB [facility] last seen normal at 2230 and awoke with heavy right side and slurred speech.  PT with several GLF this AM     Note: History obtained from both patient and son, present at bedside    HPI: Alexey Caballero is a 76 y.o. male with history of CAD (s/p MI 2007, s/p stent x2, on ASA 81 MWF), obesity, hypercholesterolemia (not on statin), bilateral carotid artery stenosis, and HTN who presented to the hospital for right-sided weakness. His last known normal was 10/11/2021 at 10 PM. Around 4:45 AM on 10/12/2021, he woke up with right-sided weakness, right facial numbness, difficulty with coordination, and slurred speech. His son called EMS and he was taken to the hospital. SBP was around 110s mmHg. CTH did not show any hemorrhage; CT angiogram of the head did not show a large vessel occlusion. CT angiogram of hte neck showed 50-60% stenosis of the L ICA with irregularly soft/calcified plaque. Neurology has been consulted for stroke.    During our interview with the patient this AM, the patient stated that his weakness, coordination difficulty, and slurred speech had mostly resolved although he still has some residual weakness. He denies chest pain and shortness of breath. He states this has never happened before. He does not remember any significant family history of stroke or MI at young age.    Review of systems: In addition to what is detailed in the HPI above, (and scanned into the chart if and when applicable), all other systems reviewed and are negative.    Past Medical History:    has a past medical history of Allergy, Arthritis, Blood transfusion without reported diagnosis, Cancer (HCC), and Heart attack (HCC).    FHx:  family history includes Arthritis in his father; Heart Disease in his father; Hyperlipidemia in his father.    SHx:   reports that he has quit  smoking. He has never used smokeless tobacco. He reports current alcohol use. He reports that he does not use drugs.   Occasional alcohol use, no drugs, no smoking    Allergies:  Allergies   Allergen Reactions   • Tape Rash     Pt requests only paper tape as hypoallergenic tape gives him a severe rash.        Medications:    Current Facility-Administered Medications:   •  Allow for permissive hypertension: SBP up to 220 mmHg/DBP to 120 mmHg; If SBP greater than 220 mmHg or DBP greater than 120 mmHg administer PRN antihypertensive medications., , Other, PHARMACY TO DOSE, Grover Rodriguez M.D.  •  aspirin (ASA) tablet 325 mg, 325 mg, Oral, DAILY **OR** aspirin (ASA) chewable tab 324 mg, 324 mg, Oral, DAILY **OR** aspirin (ASA) suppository 300 mg, 300 mg, Rectal, DAILY, Grover Rodriguez M.D.  •  atorvastatin (LIPITOR) tablet 80 mg, 80 mg, Oral, Q EVENING, Grover Rodriguez M.D.  •  therapeutic multivitamin-minerals (THERAGRAN-M) tablet 1 Tablet, 1 Tablet, Oral, DAILY, Grover Rodriguez M.D.  •  senna-docusate (PERICOLACE or SENOKOT S) 8.6-50 MG per tablet 2 Tablet, 2 Tablet, Oral, BID **AND** polyethylene glycol/lytes (MIRALAX) PACKET 1 Packet, 1 Packet, Oral, QDAY PRN **AND** magnesium hydroxide (MILK OF MAGNESIA) suspension 30 mL, 30 mL, Oral, QDAY PRN **AND** bisacodyl (DULCOLAX) suppository 10 mg, 10 mg, Rectal, QDAY PRN, Grover Rodriguez M.D.  •  acetaminophen (Tylenol) tablet 650 mg, 650 mg, Oral, Q6HRS PRN, Grover Rodriguez M.D.    Current Outpatient Medications:   •  GARLIC PO, Take 1 Capsule by mouth every day., Disp: , Rfl:   •  Omega-3 Fatty Acids (FISH OIL PO), Take 1 Capsule by mouth every day., Disp: , Rfl:   •  Cholecalciferol (VITAMIN D) 2000 UNIT Tab, Take 2,000 Units by mouth every 7 days., Disp: , Rfl:   •  therapeutic multivitamin-minerals (THERAGRAN-M) Tab, Take 1 Tab by mouth every day., Disp: , Rfl:   •  aspirin (ASA) 81 MG Chew Tab chewable tablet, Chew 81 mg every Monday, Wednesday, and Friday., Disp: , Rfl:  "    Physical Examination:     Vitals:    10/12/21 0620 10/12/21 0700 10/12/21 0730 10/12/21 0800   BP: 116/88 (!) 165/91 151/88 (!) 165/90   Pulse: 71 66 63 62   Resp: 14 14 16 15   Temp: 36.6 °C (97.9 °F)      TempSrc: Temporal      SpO2: 91% 92% 93% 91%   Weight: 99.3 kg (218 lb 14.7 oz)      Height: 1.727 m (5' 7.99\")          General: Patient is awake and in no acute distress, laying in bed, pleasant  Eyes: examination of optic disks not indicated at this time  CV: RRR    NEUROLOGICAL EXAM:     Mental status: Awake, alert and fully oriented, follows commands  Speech and language: speech is clear and fluent. The patient is able to name and repeat.  Cranial nerve exam: Pupils are equal, round and reactive to light bilaterally. Visual fields are full. Extraocular muscles are intact. Sensation in the face is intact to light touch. Face is symmetric. Hearing to finger rub equal. Palate elevates symmetrically. Shoulder shrug is full. Tongue is midline.  Motor exam: Strength 5/5 in left upper and left lower extremity; 4/5 in right upper and right lower extremities.  Tone is normal. Slight drift in right upper extremity.  Sensory exam: No sensory deficits appreciated.  Deep tendon reflexes:  2+ and symmetric. Toes down-going bilaterally.  Coordination: Slight right-sided ataxia  Gait: deferred    NIH Stroke Scale    1a. Level of Consciousness (Alert, drowsy, etc): 0= Alert    1b. LOC Questions (Month, age): 0= Answers both correctly    1c. LOC Commands (Open/close eyes make fist/let go): 0= Obeys both correctly    2.   Best Gaze (Eyes open - patient follows examiner's finger on face): 0= Normal    3.   Visual Fields (introduce visual stimulus/threat to patient's field quadrants): 0= No visual loss    4.   Facial Paresis (Show teeth, raise eyebrows and squeeze eyes shut): 0= Normal     5a. Motor Arm - Left (Elevate arm to 90 degrees if patient is sitting, 45 degrees if  supine): 0= No drift    5b. Motor Arm - Right " (Elevate arm to 90 degrees if patient is sitting, 45 degrees if supine): 1= Drift    6a. Motor Leg - Left (Elevate leg 30 degrees with patient supine): 0= No drift    6b. Motor Leg - Right  (Elevate leg 30 degrees with patient supine): 0= No drift    7.   Limb Ataxia (Finger-nose, heel down shin): 1= Present in 1 limb    8.   Sensory (Pin prick to face, arm, trunk and leg - compare side to side): 0= Normal    9.  Best Language (Name item, describe a picture and read sentences): 0= No aphasia    10. Dysarthria (Evaluate speech clarity by patient repeating listed words): 0= Normal articulation    11. Extinction and Inattention (Use information from prior testing to identify neglect or  double simultaneous stimuli testing): 0= No neglect    Total NIH Score: 2      Objective Data:    Labs:  Lab Results   Component Value Date/Time    PROTHROMBTM 16.8 (H) 10/12/2021 06:02 AM    INR 1.41 (H) 10/12/2021 06:02 AM      Lab Results   Component Value Date/Time    WBC 9.0 10/12/2021 06:02 AM    RBC 4.61 (L) 10/12/2021 06:02 AM    HEMOGLOBIN 14.9 10/12/2021 06:02 AM    HEMATOCRIT 44.2 10/12/2021 06:02 AM    MCV 95.9 10/12/2021 06:02 AM    MCH 32.3 10/12/2021 06:02 AM    MCHC 33.7 10/12/2021 06:02 AM    MPV 12.4 10/12/2021 06:02 AM    NEUTSPOLYS 50.00 10/12/2021 06:02 AM    LYMPHOCYTES 37.00 10/12/2021 06:02 AM    MONOCYTES 7.80 10/12/2021 06:02 AM    EOSINOPHILS 4.30 10/12/2021 06:02 AM    BASOPHILS 0.70 10/12/2021 06:02 AM      Lab Results   Component Value Date/Time    SODIUM 136 10/12/2021 06:02 AM    POTASSIUM 3.6 10/12/2021 06:02 AM    CHLORIDE 102 10/12/2021 06:02 AM    CO2 23 10/12/2021 06:02 AM    GLUCOSE 109 (H) 10/12/2021 06:02 AM    BUN 15 10/12/2021 06:02 AM    CREATININE 0.99 10/12/2021 06:02 AM      Lab Results   Component Value Date/Time    CHOLSTRLTOT 233 (H) 01/10/2018 09:14 AM     (H) 01/10/2018 09:14 AM    HDL 58 01/10/2018 09:14 AM    TRIGLYCERIDE 87 01/10/2018 09:14 AM       Lab Results   Component  Value Date/Time    ALKPHOSPHAT 59 10/12/2021 06:02 AM    ASTSGOT 24 10/12/2021 06:02 AM    ALTSGPT 21 10/12/2021 06:02 AM    TBILIRUBIN 0.5 10/12/2021 06:02 AM        Imaging/Testing:    Transthoracic echocardiogram 10/12/2021: No acute abnormalities, EF 60%, no patent foramen ovale noted    MRI brain without contrast, 10/12/2021: Per our independent read, there is an infarct in the left posterior limb of the internal capsule (PLIC) as well as a small punctate infarct in the left occipital region.    EC-ECHOCARDIOGRAM COMPLETE W/O CONT   Final Result      DX-CHEST-PORTABLE (1 VIEW)   Final Result      Mild cardiomegaly      CT-CTA NECK WITH & W/O-POST PROCESSING   Final Result      1.  Left carotid arterial bifurcation atherosclerotic plaque results in moderate (50-70% diameter) stenosis.      2.  Right carotid arterial bifurcation atherosclerotic plaque results in mild (less than 50% diameter) stenosis.      3.  Patent vertebral arteries.      CT-CTA HEAD WITH & W/O-POST PROCESS   Final Result      No evidence of intracranial vascular occlusion or aneurysm.      CT-CEREBRAL PERFUSION ANALYSIS   Final Result      1.  Cerebral blood flow less than 30% likely representing completed infarct = 0 mL.      2.  T Max more than 6 seconds likely representing combination of completed infarct and ischemia = 0 mL.      3.  Mismatched volume likely representing ischemic brain/penumbra = None      4.  Please note that the cerebral perfusion was performed on the limited brain tissue around the basal ganglia region. Infarct/ischemia outside the CT perfusion sections can be missed in this study.      CT-HEAD W/O   Final Result      No evidence of acute intracranial process.      MR-BRAIN-W/O    (Results Pending)         Assessment and Plan:    Patient is a 76 y.o. male with history of CAD (s/p MI 2007, s/p stent x2, on ASA 81 MWF), obesity, hypercholesterolemia (not on statin), bilateral carotid artery stenosis, and HTN who  presented to the hospital for right-sided weakness. Neurology was consulted to address stroke.    #Left PLIC infarct  #Left occipital punctate infarct    These correspond to the patient's symptoms.  Latest NIHSS is 2.  TTE was unremarkable.  Patient has symptomatic left carotid stenosis.    -Consider following up with vascular surgery for symptomatic carotid stenosis  -Follow-up on hemoglobin A1c and lipid profile  -Start patient on aspirin 81 mg daily rather than Monday Wednesday Friday; we have counseled the patient on the risks and benefits of daily aspirin therapy.  -Continue atorvastatin; if patient cannot tolerate due to myalgia side effect, consider switch to rosuvastatin  -Allow for permissive hypertension up to 220/120 mmHg until 10/14/2021 around 7:30 AM  -Physical therapy, Occupational Therapy, speech and language pathology therapy  -Every 4 hour neurochecks  -Follow-up in [facility]    Thank you for this consult.    This chart was partially generated using voice recognition technology and sound alike word replacement may be present, best efforts were made to make the chart accurate.    Lenore Arias MD, MPH  [facility], PGY-2

## 2021-10-12 NOTE — H&P
Hospital Medicine History & Physical Note    Date of Service  10/12/2021    Primary Care Physician  DENY Abdi.    Consultants  neurology    Specialist Names: Dr. Pinto    Code Status  Full Code    Chief Complaint  Chief Complaint   Patient presents with   • Possible Stroke     PT BIB [facility] last seen normal at 2230 and awoke with heavy right side and slurred speech.  PT with several GLF this AM       History of Presenting Illness  Alexey Caballero is a 76 y.o. male who presented 10/12/2021 with right sided weakness and difficulty speaking.    I have reviewed some of the recent provider documentation available to me in the patient's medical chart.  Records are briefly summarized: Mr. Caballero has a history of coronary artery disease with prior myocardial infarction.  His last encounter in her system was related to a left hand laceration, he was treated in the emergency room.    History taken from the patient and his son who is at the bedside.  Patient says that he has been in his normal state of health until he went to bed at approximately 1030 last night.  When he awakened this morning at approximately  AM he felt as though his right side was heavy he endorses difficulty with coordination, some difficulty walking, and when he tried to speak he felt it was hard to find the words.  Patient has significant daily difficulty walking to the bathroom.  The patient's son evaluated and was concerned, he called EMS and patient as brought to the ER for evaluation.  Since arrival here they both feel the patient's speech is improved but is not back to normal.  Patient feels the symptoms on his right side are not improving.    I discussed the plan of care with patient and family.    Review of Systems  Review of Systems   Constitutional: Negative for chills and malaise/fatigue.   Respiratory: Negative for cough, hemoptysis and sputum production.    Cardiovascular: Negative for chest pain, palpitations and orthopnea.    Gastrointestinal: Negative for nausea and vomiting.   Skin: Negative for itching and rash.   Neurological: Positive for speech change and focal weakness. Negative for dizziness.   All other systems reviewed and are negative.      Past Medical History   has a past medical history of Allergy, Arthritis, Blood transfusion without reported diagnosis, Cancer (HCC), and Heart attack (HCC).    Surgical History   has a past surgical history that includes colon resection; open reduction; and arthroplasty.     Family History  family history includes Arthritis in his father; Heart Disease in his father; Hyperlipidemia in his father.     Social History   reports that he has quit smoking. He has never used smokeless tobacco. He reports current alcohol use. He reports that he does not use drugs.    Allergies  Allergies   Allergen Reactions   • Tape Rash     Pt requests only paper tape as hypoallergenic tape gives him a severe rash.        Medications  Prior to Admission Medications   Prescriptions Last Dose Informant Patient Reported? Taking?   Cholecalciferol (VITAMIN D) 2000 UNIT Tab 10/11/2021 at AM Family Member Yes Yes   Sig: Take 2,000 Units by mouth every 7 days.   GARLIC PO 10/11/2021 at AM Family Member Yes Yes   Sig: Take 1 Capsule by mouth every day.   Omega-3 Fatty Acids (FISH OIL PO) 10/11/2021 at AM Family Member Yes Yes   Sig: Take 1 Capsule by mouth every day.   aspirin (ASA) 81 MG Chew Tab chewable tablet 10/11/2021 at AM Patient Yes No   Sig: Chew 81 mg every Monday, Wednesday, and Friday.   therapeutic multivitamin-minerals (THERAGRAN-M) Tab 10/11/2021 at AM Patient Yes No   Sig: Take 1 Tab by mouth every day.      Facility-Administered Medications: None       Physical Exam  Temp:  [36.6 °C (97.9 °F)] 36.6 °C (97.9 °F)  Pulse:  [62-71] 62  Resp:  [14-16] 15  BP: (116-165)/(88-91) 165/90  SpO2:  [91 %-93 %] 91 %  Blood Pressure : (!) 165/90   Temperature: 36.6 °C (97.9 °F)   Pulse: 62   Respiration: 15   Pulse  Oximetry: 91 %       Physical Exam  Constitutional:       General: He is not in acute distress.     Appearance: Normal appearance. He is normal weight.   HENT:      Head: Normocephalic and atraumatic.      Right Ear: External ear normal.      Left Ear: External ear normal.      Nose: Nose normal.      Mouth/Throat:      Mouth: Mucous membranes are moist.      Pharynx: Oropharynx is clear.   Eyes:      Extraocular Movements: Extraocular movements intact.      Conjunctiva/sclera: Conjunctivae normal.      Pupils: Pupils are equal, round, and reactive to light.   Cardiovascular:      Rate and Rhythm: Normal rate and regular rhythm.      Pulses: Normal pulses.   Pulmonary:      Effort: Pulmonary effort is normal.      Breath sounds: Normal breath sounds.   Abdominal:      General: Abdomen is flat. Bowel sounds are normal.      Palpations: Abdomen is soft.   Musculoskeletal:         General: Normal range of motion.      Cervical back: Normal range of motion and neck supple.   Skin:     General: Skin is warm and dry.      Capillary Refill: Capillary refill takes less than 2 seconds.      Coloration: Skin is not jaundiced.   Neurological:      Mental Status: He is alert and oriented to person, place, and time.      Cranial Nerves: No cranial nerve deficit.      Gait: Gait normal.      Comments: Subtle weakness and poor coordination in right extremities, UE more pronounced    Speech is fluent and appropriate   Psychiatric:         Mood and Affect: Mood normal.         Behavior: Behavior normal.         Laboratory:  Recent Labs     10/12/21  0602   WBC 9.0   RBC 4.61*   HEMOGLOBIN 14.9   HEMATOCRIT 44.2   MCV 95.9   MCH 32.3   MCHC 33.7   RDW 45.2   PLATELETCT 241   MPV 12.4         No results for input(s): ALTSGPT, ASTSGOT, ALKPHOSPHAT, TBILIRUBIN, DBILIRUBIN, GAMMAGT, AMYLASE, LIPASE, ALB, PREALBUMIN, GLUCOSE in the last 72 hours.  Recent Labs     10/12/21  0602   APTT 34.4   INR 1.41*     No results for input(s): NTPROBNP  in the last 72 hours.      No results for input(s): TROPONINT in the last 72 hours.    Imaging:  DX-CHEST-PORTABLE (1 VIEW)   Final Result      Mild cardiomegaly      CT-CTA NECK WITH & W/O-POST PROCESSING   Final Result      1.  Left carotid arterial bifurcation atherosclerotic plaque results in moderate (50-70% diameter) stenosis.      2.  Right carotid arterial bifurcation atherosclerotic plaque results in mild (less than 50% diameter) stenosis.      3.  Patent vertebral arteries.      CT-CTA HEAD WITH & W/O-POST PROCESS   Final Result      No evidence of intracranial vascular occlusion or aneurysm.      CT-CEREBRAL PERFUSION ANALYSIS   Final Result      1.  Cerebral blood flow less than 30% likely representing completed infarct = 0 mL.      2.  T Max more than 6 seconds likely representing combination of completed infarct and ischemia = 0 mL.      3.  Mismatched volume likely representing ischemic brain/penumbra = None      4.  Please note that the cerebral perfusion was performed on the limited brain tissue around the basal ganglia region. Infarct/ischemia outside the CT perfusion sections can be missed in this study.      CT-HEAD W/O   Final Result      No evidence of acute intracranial process.      MR-BRAIN-W/O    (Results Pending)   EC-ECHOCARDIOGRAM COMPLETE W/O CONT    (Results Pending)       X-Ray:  My impression is: no intracranial hemmorhage    Assessment/Plan:  I anticipate this patient will require at least two midnights for appropriate medical management, necessitating inpatient admission.    * Ischemic stroke (HCC)- (present on admission)  Assessment & Plan  Patient has acute onset of focal neurologic symptoms  CT imaging is unremarkable with the exception of carotid stenosis  Mostly diagnosis is a stroke  Admit to neurology floor for workup and treatment  Continuous hemodynamic monitoring on telemetry  MRI brain, echocardiogram  High intensity statin  Antiplatelet  therapy  PT/OT/Speech    Bilateral carotid artery stenosis- (present on admission)  Assessment & Plan  Intervention may be warranted if stroke is proven on MRI  Aspirin and atorvastatin    Aphasia- (present on admission)  Assessment & Plan  Speech therapy    Pure hypercholesterolemia- (present on admission)  Assessment & Plan  Patient was previously with a statin but stopped taking in the last few years  Start atorvastatin 80 for acute stroke    Obesity (BMI 30-39.9)- (present on admission)  Assessment & Plan  Body mass index is 33.29 kg/m².    Coronary artery disease involving native coronary artery of native heart without angina pectoris- (present on admission)  Assessment & Plan  History of coronary artery disease with myocardial infarction 2007  Patient received 2 stents in  [place]  Continue aspirin, start atorvastatin      VTE prophylaxis: SCDs/TEDs

## 2021-10-12 NOTE — ED NOTES
Patient placed on hospital bed. Given lunch. Patient son at bedside to assist with feeding. Patient on monitor. Right sided deficits improving per family. Patient on monitor and in gown.

## 2021-10-13 LAB
CHOLEST SERPL-MCNC: 221 MG/DL (ref 100–199)
HDLC SERPL-MCNC: 59 MG/DL
LDLC SERPL CALC-MCNC: 145 MG/DL
TRIGL SERPL-MCNC: 84 MG/DL (ref 0–149)

## 2021-10-13 PROCEDURE — 97166 OT EVAL MOD COMPLEX 45 MIN: CPT

## 2021-10-13 PROCEDURE — 92526 ORAL FUNCTION THERAPY: CPT

## 2021-10-13 PROCEDURE — 97161 PT EVAL LOW COMPLEX 20 MIN: CPT

## 2021-10-13 PROCEDURE — 99232 SBSQ HOSP IP/OBS MODERATE 35: CPT | Mod: GC | Performed by: PSYCHIATRY & NEUROLOGY

## 2021-10-13 PROCEDURE — 770020 HCHG ROOM/CARE - TELE (206)

## 2021-10-13 PROCEDURE — 700102 HCHG RX REV CODE 250 W/ 637 OVERRIDE(OP): Performed by: HOSPITALIST

## 2021-10-13 PROCEDURE — 99233 SBSQ HOSP IP/OBS HIGH 50: CPT | Performed by: INTERNAL MEDICINE

## 2021-10-13 PROCEDURE — A9270 NON-COVERED ITEM OR SERVICE: HCPCS | Performed by: HOSPITALIST

## 2021-10-13 PROCEDURE — 80061 LIPID PANEL: CPT

## 2021-10-13 PROCEDURE — 36415 COLL VENOUS BLD VENIPUNCTURE: CPT

## 2021-10-13 PROCEDURE — 700111 HCHG RX REV CODE 636 W/ 250 OVERRIDE (IP): Performed by: HOSPITALIST

## 2021-10-13 PROCEDURE — 99223 1ST HOSP IP/OBS HIGH 75: CPT | Performed by: PHYSICAL MEDICINE & REHABILITATION

## 2021-10-13 PROCEDURE — 97535 SELF CARE MNGMENT TRAINING: CPT

## 2021-10-13 RX ORDER — ASPIRIN 81 MG/1
81 TABLET, CHEWABLE ORAL DAILY
Status: DISCONTINUED | OUTPATIENT
Start: 2021-10-14 | End: 2021-10-16 | Stop reason: HOSPADM

## 2021-10-13 RX ADMIN — ASPIRIN 325 MG ORAL TABLET 325 MG: 325 PILL ORAL at 05:26

## 2021-10-13 RX ADMIN — ENOXAPARIN SODIUM 40 MG: 40 INJECTION SUBCUTANEOUS at 05:26

## 2021-10-13 RX ADMIN — Medication 1 TABLET: at 05:26

## 2021-10-13 ASSESSMENT — COGNITIVE AND FUNCTIONAL STATUS - GENERAL
CLIMB 3 TO 5 STEPS WITH RAILING: A LITTLE
MOBILITY SCORE: 18
MOVING TO AND FROM BED TO CHAIR: A LITTLE
SUGGESTED CMS G CODE MODIFIER MOBILITY: CJ
PERSONAL GROOMING: A LITTLE
SUGGESTED CMS G CODE MODIFIER MOBILITY: CK
MOBILITY SCORE: 20
DAILY ACTIVITIY SCORE: 22
DRESSING REGULAR UPPER BODY CLOTHING: A LITTLE
DRESSING REGULAR UPPER BODY CLOTHING: A LITTLE
DRESSING REGULAR LOWER BODY CLOTHING: A LITTLE
MOVING FROM LYING ON BACK TO SITTING ON SIDE OF FLAT BED: A LITTLE
SUGGESTED CMS G CODE MODIFIER DAILY ACTIVITY: CK
TOILETING: A LITTLE
STANDING UP FROM CHAIR USING ARMS: A LITTLE
DAILY ACTIVITIY SCORE: 18
SUGGESTED CMS G CODE MODIFIER DAILY ACTIVITY: CJ
CLIMB 3 TO 5 STEPS WITH RAILING: A LOT
MOVING TO AND FROM BED TO CHAIR: A LITTLE
HELP NEEDED FOR BATHING: A LOT
WALKING IN HOSPITAL ROOM: A LITTLE
DRESSING REGULAR LOWER BODY CLOTHING: A LITTLE
WALKING IN HOSPITAL ROOM: A LITTLE
STANDING UP FROM CHAIR USING ARMS: A LITTLE

## 2021-10-13 ASSESSMENT — GAIT ASSESSMENTS
DEVIATION: ATAXIC
GAIT LEVEL OF ASSIST: MINIMAL ASSIST
ASSISTIVE DEVICE: FRONT WHEEL WALKER
DISTANCE (FEET): 100

## 2021-10-13 ASSESSMENT — ACTIVITIES OF DAILY LIVING (ADL): TOILETING: INDEPENDENT

## 2021-10-13 ASSESSMENT — LIFESTYLE VARIABLES
AVERAGE NUMBER OF DAYS PER WEEK YOU HAVE A DRINK CONTAINING ALCOHOL: 2
ON A TYPICAL DAY WHEN YOU DRINK ALCOHOL HOW MANY DRINKS DO YOU HAVE: 2
EVER FELT BAD OR GUILTY ABOUT YOUR DRINKING: NO
EVER HAD A DRINK FIRST THING IN THE MORNING TO STEADY YOUR NERVES TO GET RID OF A HANGOVER: NO
TOTAL SCORE: 0
DOES PATIENT WANT TO STOP DRINKING: NO
TOTAL SCORE: 0
HOW MANY TIMES IN THE PAST YEAR HAVE YOU HAD 5 OR MORE DRINKS IN A DAY: 0
CONSUMPTION TOTAL: NEGATIVE
HAVE PEOPLE ANNOYED YOU BY CRITICIZING YOUR DRINKING: NO
ALCOHOL_USE: YES
TOTAL SCORE: 0
HAVE YOU EVER FELT YOU SHOULD CUT DOWN ON YOUR DRINKING: NO

## 2021-10-13 ASSESSMENT — PAIN DESCRIPTION - PAIN TYPE
TYPE: ACUTE PAIN

## 2021-10-13 ASSESSMENT — PATIENT HEALTH QUESTIONNAIRE - PHQ9
1. LITTLE INTEREST OR PLEASURE IN DOING THINGS: NOT AT ALL
SUM OF ALL RESPONSES TO PHQ9 QUESTIONS 1 AND 2: 0
2. FEELING DOWN, DEPRESSED, IRRITABLE, OR HOPELESS: NOT AT ALL

## 2021-10-13 NOTE — PROGRESS NOTES
[facility] Medicine Daily Progress Note    Date of Service  10/13/2021    Chief Complaint  Alexey Caballero is a 76 y.o. male admitted 10/12/2021 with right sided weakness and difficulty speaking.    Hospital Course  Mr. Caballero has a history of coronary artery disease with prior myocardial infarction.  His last encounter in her system was related to a left hand laceration, he was treated in the emergency room.     History taken from the patient and his son who is at the bedside.  Patient says that he has been in his normal state of health until he went to bed at approximately 1030 last night.  When he awakened this morning at approximately  AM he felt as though his right side was heavy he endorses difficulty with coordination, some difficulty walking, and when he tried to speak he felt it was hard to find the words.  Patient has significant daily difficulty walking to the bathroom.  The patient's son evaluated and was concerned, he called EMS and patient as brought to the ER for evaluation.  Since arrival here they both feel the patient's speech is improved but is not back to normal.  Patient feels the symptoms on his right side are not improving.      Interval Problem Update  Cheerful today. No new complaints.  Continue permissive HTN until tomorrow am.  Physiatry consulted. Appreciate recommendations.    I have personally seen and examined the patient at bedside. I discussed the plan of care with patient, bedside RN, charge RN,  and pharmacy.    Consultants/Specialty  neurology and physiatry    Code Status  Full Code    Disposition  Patient is not medically cleared.   Anticipate discharge to to an inpatient rehabilitation hospital.  I have placed the appropriate orders for post-discharge needs.    Review of Systems  Review of Systems   Constitutional: Negative for chills and fever.   Gastrointestinal: Negative for nausea and vomiting.   Neurological: Negative for dizziness and headaches.   All other  systems reviewed and are negative.       Physical Exam  Temp:  [36.5 °C (97.7 °F)-36.8 °C (98.3 °F)] 36.8 °C (98.3 °F)  Pulse:  [59-83] 83  Resp:  [16-18] 18  BP: (140-171)/(79-98) 167/87  SpO2:  [91 %-96 %] 96 %    Physical Exam  Vitals and nursing note reviewed.   Constitutional:       General: He is not in acute distress.  HENT:      Head: Normocephalic and atraumatic.      Right Ear: External ear normal.      Left Ear: External ear normal.      Nose: Nose normal.      Mouth/Throat:      Mouth: Mucous membranes are moist.      Pharynx: Oropharynx is clear.   Eyes:      General: No scleral icterus.     Conjunctiva/sclera: Conjunctivae normal.   Cardiovascular:      Rate and Rhythm: Normal rate and regular rhythm.   Pulmonary:      Effort: Pulmonary effort is normal.      Breath sounds: Normal breath sounds.   Abdominal:      Palpations: Abdomen is soft.      Tenderness: There is no abdominal tenderness. There is no guarding.   Musculoskeletal:         General: No swelling or deformity.      Cervical back: Normal range of motion and neck supple.   Skin:     General: Skin is warm and dry.   Neurological:      General: No focal deficit present.      Mental Status: He is alert and oriented to person, place, and time.   Psychiatric:         Mood and Affect: Mood normal.         Behavior: Behavior normal.         Fluids    Intake/Output Summary (Last 24 hours) at 10/13/2021 1628  Last data filed at 10/13/2021 0600  Gross per 24 hour   Intake 500 ml   Output 400 ml   Net 100 ml       Laboratory  Recent Labs     10/12/21  0602   WBC 9.0   RBC 4.61*   HEMOGLOBIN 14.9   HEMATOCRIT 44.2   MCV 95.9   MCH 32.3   MCHC 33.7   RDW 45.2   PLATELETCT 241   MPV 12.4     Recent Labs     10/12/21  0602   SODIUM 136   POTASSIUM 3.6   CHLORIDE 102   CO2 23   GLUCOSE 109*   BUN 15   CREATININE 0.99   CALCIUM 9.2     Recent Labs     10/12/21  0602   APTT 34.4   INR 1.41*         Recent Labs     10/13/21  0145   TRIGLYCERIDE 84   HDL 59    *       Imaging  MR-BRAIN-W/O   Final Result         Small foci of acute infarction in the left frontal subcortical region, left posterior limb internal capsule/corona radiata and left occipital white matter.      Remote left medial parietal, left coronal radiata and left basal ganglia lacunar infarcts.      EC-ECHOCARDIOGRAM COMPLETE W/O CONT   Final Result      DX-CHEST-PORTABLE (1 VIEW)   Final Result      Mild cardiomegaly      CT-CTA NECK WITH & W/O-POST PROCESSING   Final Result      1.  Left carotid arterial bifurcation atherosclerotic plaque results in moderate (50-70% diameter) stenosis.      2.  Right carotid arterial bifurcation atherosclerotic plaque results in mild (less than 50% diameter) stenosis.      3.  Patent vertebral arteries.      CT-CTA HEAD WITH & W/O-POST PROCESS   Final Result      No evidence of intracranial vascular occlusion or aneurysm.      CT-CEREBRAL PERFUSION ANALYSIS   Final Result      1.  Cerebral blood flow less than 30% likely representing completed infarct = 0 mL.      2.  T Max more than 6 seconds likely representing combination of completed infarct and ischemia = 0 mL.      3.  Mismatched volume likely representing ischemic brain/penumbra = None      4.  Please note that the cerebral perfusion was performed on the limited brain tissue around the basal ganglia region. Infarct/ischemia outside the CT perfusion sections can be missed in this study.      CT-HEAD W/O   Final Result      No evidence of acute intracranial process.           Assessment/Plan  * Ischemic stroke (HCC)- (present on admission)  Assessment & Plan  Patient has acute onset of focal neurologic symptoms  CT imaging is unremarkable with the exception of carotid stenosis  Mostly diagnosis is a stroke  Continuous hemodynamic monitoring on telemetry  MRI brain indicative of stroke  Echocardiogram without septal abnormality  Continue high intensity statin  Continue antiplatelet  therapy  PT/OT/Speech  Appreciate physiatry assistance    Bilateral carotid artery stenosis- (present on admission)  Assessment & Plan  Intervention may be warranted if stroke is proven on MRI  Aspirin and atorvastatin    Aphasia- (present on admission)  Assessment & Plan  Speech therapy    Pure hypercholesterolemia- (present on admission)  Assessment & Plan  Patient was previously with a statin but stopped taking in the last few years  Continue atorvastatin 80 for acute stroke    Obesity (BMI 30-39.9)- (present on admission)  Assessment & Plan  Body mass index is 33.29 kg/m².   No acute interventions. Recommend lifestyle changes and outpatient follow up.    Coronary artery disease involving native coronary artery of native heart without angina pectoris- (present on admission)  Assessment & Plan  History of coronary artery disease with myocardial infarction 2007  Patient received 2 stents in  [place]  Continue aspirin, start atorvastatin       VTE prophylaxis: SCDs/TEDs and enoxaparin ppx    I have performed a physical exam and reviewed and updated ROS and Plan today (10/13/2021). In review of yesterday's note (10/12/2021), there are no changes except as documented above.

## 2021-10-13 NOTE — CARE PLAN
The patient is Stable - Low risk of patient condition declining or worsening    Shift Goals  Clinical Goals: maintain neuro status  Patient Goals: sleep  Family Goals: JOHNATHAN    Progress made toward(s) clinical / shift goals:  Patient was educated on care plan, stroke condition, stroke risk factors and interventions. Patient was provided stroke education guide. Patient has maintained a stable neuro status and is A&Ox4. Patient has been able to have consistent fluid intake and has permissive hypertension ordered per MD, BP taken Q4 hours.    Problem: Knowledge Deficit - Stroke Education  Goal: Patient's knowledge of stroke and risk factors will improve  Outcome: Progressing     Problem: Neuro Status  Goal: Neuro status will remain stable or improve  Outcome: Progressing     Problem: Hemodynamic Monitoring  Goal: Patient's hemodynamics, fluid balance and neurologic status will be stable or improve  Outcome: Progressing       Patient is not progressing towards the following goals:

## 2021-10-13 NOTE — THERAPY
Speech Language Pathology  Daily Treatment     Patient Name: Alexey Caballero  Age:  76 y.o., Sex:  male  Medical Record #: [id number]  Today's Date: 10/13/2021     Precautions  Precautions: (P) Fall Risk, Swallow Precautions ( See Comments)  Comments: (P) right weakness    Assessment    Pt verbalizing concerns regarding his slight to mild right facial weakness and speech changes since his CVA. Mild imprecise articulation noted with spont speech. Pt provided with written and verbal educ with demonstration for OMEX targeting right sided facial weakness. Possible occasional word retrieval difficulty. Pt taking sips of thin water using a straw without s/s of difficulty. Pt denying any swallow difficulty with EC7 texture at [facility]. Per OT, safety and mobility concerns with rehab recommended.     Plan  1. EC7/TNO 2. OMEX indep and frequently during the day 3. High level cognitive linguistic eval.   Continue current treatment plan.    Discharge Recommendations: (P)  (pending cog eval)      See flow sheet for daily tx information.

## 2021-10-13 NOTE — PROGRESS NOTES
4 Eyes Skin Assessment Completed by Lori RN and GERARD Angeles.    Head WDL  Ears WDL  Nose WDL  Mouth WDL  Neck WDL  Breast/Chest WDL  Shoulder Blades WDL  Spine Redness and Blanching  (R) Arm/Elbow/Hand Scab  (L) Arm/Elbow/Hand Bruising  Abdomen Redness and Blanching  Groin WDL  Scrotum/Coccyx/Buttocks Redness and Blanching  (R) Leg WDL  (L) Leg WDL  (R) Heel/Foot/Toe WDL  (L) Heel/Foot/Toe WDL          Devices In Places Tele Box      Interventions In Place N/A    Possible Skin Injury No    Pictures Uploaded Into Epic N/A  Wound Consult Placed N/A  RN Wound Prevention Protocol Ordered No
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
## 2021-10-13 NOTE — PROGRESS NOTES
Patient is on floor, vital signs stable with permissive HTN, NIH performed. Patient oriented to room and floor, call button within reach and bed alarm on. Tele box connected monitor room called. Pt educated on visitor policy.  2220: This RN noticed monitor room had not placed pt on monitor. Called to confirm box # and pt MRN, pt now on monitor.

## 2021-10-13 NOTE — CONSULTS
Physical Medicine and Rehabilitation Consultation              Date of initial consultation: 10/13/2021  Consulting provider: Grover Rodriguez MD  Reason for consultation: assess for acute inpatient rehab appropriateness  LOS: 1 Day(s)    Chief complaint: ataxia    HPI: The patient is a 76 y.o. right hand dominant male with a past medical history of of CAD,  MI 2007, s/p stent x2, obesity, hypercholesterolemia, carotid artery stenosis, HTN;  who presented on 10/12/2021  6:00 AM with right sided weakness, facial numbness, ataxia and slurred speech. Patient's son called EMS and patient was brought into the ED and seen by neurology, found to have an NIH score of 2. Patient did not receive TPA. MR brain found left sided infarcts in the frontal subcortical region, posterior limb internal capsule/corona radiata and occipital white matter.    The patient currently reports right-sided facial droop, some dysarthria, ataxia, slight right-sided weakness that is improving, intermittent headache.  Patient's son is at bedside who helps answer questions.  Son is able to provide 24/7 support.  Patient has a history of left forearm reconstruction and has limited use of his left hand in certain positions.    ROS  Pertinent positives are mentioned in the HPI, all others reviewed and are negative.    Social Hx:  2 SH  2 TERRY - 1 FOS inside required, railing on right side when going up  With: son - supportive, able to assist     THERAPY:  Restrictions: Fall risk, swallow precautions   PT: Functional mobility   10/13: Walking 100' with FWW at min assist     OT: ADLs  10/13: Mod assist  LBD and grooming     SLP:   10/13: Possible occasional word retrieval difficulty. Pt taking sips of thin water using a straw without s/s of difficulty    IMAGING:    MR brain 10/12/21  Small foci of acute infarction in the left frontal subcortical region, left posterior limb internal capsule/corona radiata and left occipital white matter.  Remote left medial  "parietal, left coronal radiata and left basal ganglia lacunar infarcts.    PROCEDURES:  None    PMH:  Past Medical History:   Diagnosis Date   • Allergy    • Arthritis    • Blood transfusion without reported diagnosis    • Cancer (HCC)    • Heart attack (HCC)        PSH:  Past Surgical History:   Procedure Laterality Date   • ATHROPLASTY     • COLON RESECTION     • OPEN REDUCTION         FHX:  Family History   Problem Relation Age of Onset   • Arthritis Father    • Heart Disease Father    • Hyperlipidemia Father        Medications:  Current Facility-Administered Medications   Medication Dose   • [START ON 10/14/2021] aspirin (ASA) chewable tab 81 mg  81 mg   • Allow for permissive hypertension: SBP up to 220 mmHg/DBP to 120 mmHg; If SBP greater than 220 mmHg or DBP greater than 120 mmHg administer PRN antihypertensive medications.     • atorvastatin (LIPITOR) tablet 80 mg  80 mg   • therapeutic multivitamin-minerals (THERAGRAN-M) tablet 1 Tablet  1 Tablet   • senna-docusate (PERICOLACE or SENOKOT S) 8.6-50 MG per tablet 2 Tablet  2 Tablet    And   • polyethylene glycol/lytes (MIRALAX) PACKET 1 Packet  1 Packet    And   • magnesium hydroxide (MILK OF MAGNESIA) suspension 30 mL  30 mL    And   • bisacodyl (DULCOLAX) suppository 10 mg  10 mg   • acetaminophen (Tylenol) tablet 650 mg  650 mg   • enoxaparin (LOVENOX) inj 40 mg  40 mg       Allergies:  Allergies   Allergen Reactions   • Tape Rash     Pt requests only paper tape as hypoallergenic tape gives him a severe rash.    • Statins [Hmg-Coa-R Inhibitors] Myalgia     Patient reports severe myalgia to multiple statins in the past.         Physical Exam:  Vitals: /85   Pulse 73   Temp 36.5 °C (97.7 °F) (Temporal)   Resp 18   Ht 1.727 m (5' 7.99\")   Wt 99.3 kg (218 lb 14.7 oz)   SpO2 93%   Gen: NAD  Head:  NC/AT  Eyes/ Nose/ Mouth: PERRLA, moist mucous membranes  Cardio: RRR, good distal perfusion, warm extremities  Pulm: normal respiratory effort, no " cyanosis   Abd: Soft NTND, negative borborygmi   Ext: No peripheral edema. No calf tenderness. No clubbing.    Mental status:  A&Ox4 (person, place, date, situation) answers questions appropriately follows commands  Speech: fluent, no aphasia or dysarthria    CRANIAL NERVES:  2,3: visual acuity grossly intact, PERRL  3,4,6: EOMI bilaterally, no nystagmus or diplopia  5: sensation intact to light touch bilaterally and symmetric  7: Right facial droop  8: hearing grossly intact  9,10: symmetric palate elevation  11: SCM/Trapezius strength 5/5 bilaterally  12: tongue protrudes midline      Motor:      Upper Extremity  Myotome R L   Shoulder flexion C5 4/5 5   Elbow flexion C5 4/5 5   Wrist extension C6 5 5   Elbow extension C7 5 5   Finger flexion C8 4/5 4/5   Finger abduction T1 4/5 5     Lower Extremity Myotome R L   Hip flexion L2 5 5   Knee extension L3 5 5   Ankle dorsiflexion L4 5 5   Toe extension L5 5 5   Ankle plantarflexion S1 5 5     Sensory:   intact to light touch through out    DTRs:  Right  Left    Brachioradialis  2+  2+   Patella tendon  2+ 2+       Tone: no spasticity noted, no cogwheeling noted    Coordination:   Altered finger rebeca on right    Labs: Reviewed and significant for   Recent Labs     10/12/21  0602   RBC 4.61*   HEMOGLOBIN 14.9   HEMATOCRIT 44.2   PLATELETCT 241   PROTHROMBTM 16.8*   APTT 34.4   INR 1.41*     Recent Labs     10/12/21  0602   SODIUM 136   POTASSIUM 3.6   CHLORIDE 102   CO2 23   GLUCOSE 109*   BUN 15   CREATININE 0.99   CALCIUM 9.2     Recent Results (from the past 24 hour(s))   ABO Rh Confirm    Collection Time: 10/12/21  6:29 PM   Result Value Ref Range    ABO Rh Confirm A POS    Hemoglobin A1C    Collection Time: 10/12/21  6:29 PM   Result Value Ref Range    Glycohemoglobin 5.8 (H) 4.0 - 5.6 %    Est Avg Glucose 120 mg/dL   Lipid Profile    Collection Time: 10/13/21  1:45 AM   Result Value Ref Range    Cholesterol,Tot 221 (H) 100 - 199 mg/dL    Triglycerides 84 0 - 149  mg/dL    HDL 59 >=40 mg/dL     (H) <100 mg/dL         ASSESSMENT:  Patient is a 76 y.o. male admitted with left-sided ischemic infarct in the posterior limb internal capsule now with right-sided deficits, dysarthria, ataxia.    [facility] Code / Diagnosis to Support: 0001.2 - Stroke: Right Body Involvement (Left Brain)    Rehabilitation: Impaired ADLs and mobility  Patient is a good candidate for inpatient rehab based on needs for PT, OT, and speech therapy.  Patient will also benefit from family training.  Patient has a good discharge situation which will be home with son.     Barriers to transfer include: Insurance authorization, TCCs to verify disposition, medical clearance and bed availability     Additional Recommendations:  -Good candidate for IPR.  Patient has functional deficits with mobility, ADLs and dysarthria.  He has good family support from his son.  He has a stable discharge environment.  Patient will need to work on stair training.  -Continue PT OT and SLP while in house  -Patient is agreeable to IPR, could possibly admit tomorrow 10/14 pending bed availability.  Please look for updates under the discharge tab.  -No neuropathic pain  -On aspirin 81 mg daily and Lipitor 80 mg nightly per neurology  -Currently allowed for permissive hypertension, afterwards would appreciate recommendations on BP control under 140/90  -PMR to follow in the periphery for rehab appropriateness, please reach out with questions or request for medical management    Medical Complexity:  Hypertension  Left ischemic stroke      DVT PPX: Lovenox 40 mg injection daily      Thank you for allowing us to participate in the care of this patient.     Patient was seen for 82 minutes on unit/floor of which > 50% of time was spent on counseling and coordination of care regarding the above, including prognosis, risk reduction, benefits of treatment, and options for next stage of care.    Gavin Lujan, DO   Physical Medicine and  Rehabilitation     Please note that this dictation was created using voice recognition software. I have made every reasonable attempt to correct obvious errors, but there may be errors of grammar and possibly content that I did not discover before finalizing the note.

## 2021-10-13 NOTE — PROGRESS NOTES
Monitor summary: SR 60-69, DE .22, QRS .09, QT .37, with frequent PVCs per strip from monitor room.

## 2021-10-13 NOTE — PROGRESS NOTES
Neurology Progress Note      Referring Physician: Chelle Quiñones M.D.    Chief Complaint   Patient presents with   • Possible Stroke     PT MEREDITH THAYER last seen normal at 2230 and awoke with heavy right side and slurred speech.  PT with several GLF this AM       HPI: Refer to initial documented Neurology H&P, as detailed in the patient's chart.    Interval History 10/13/21:  NAEON. This morning, pt states he feels better than yesterday. No acute complaints. Denies chest pain and shortness of breath.    Review of systems: In addition to what is detailed in the HPI and/or updated in the interval history, all other systems reviewed and are negative.    Past Medical History:    has a past medical history of Allergy, Arthritis, Blood transfusion without reported diagnosis, Cancer (HCC), and Heart attack (HCC).    FHx:  family history includes Arthritis in his father; Heart Disease in his father; Hyperlipidemia in his father.    SHx:   reports that he has quit smoking. He has never used smokeless tobacco. He reports current alcohol use. He reports that he does not use drugs.    Medications:    Current Facility-Administered Medications:   •  Allow for permissive hypertension: SBP up to 220 mmHg/DBP to 120 mmHg; If SBP greater than 220 mmHg or DBP greater than 120 mmHg administer PRN antihypertensive medications., , Other, PHARMACY TO DOSE, Grover Rodriguez M.D.  •  aspirin (ASA) tablet 325 mg, 325 mg, Oral, DAILY, 325 mg at 10/13/21 0526 **OR** aspirin (ASA) chewable tab 324 mg, 324 mg, Oral, DAILY **OR** aspirin (ASA) suppository 300 mg, 300 mg, Rectal, DAILY, Grover Rodriguez M.D., 300 mg at 10/12/21 1030  •  atorvastatin (LIPITOR) tablet 80 mg, 80 mg, Oral, Q EVENING, Grover Rodriguez M.D.  •  therapeutic multivitamin-minerals (THERAGRAN-M) tablet 1 Tablet, 1 Tablet, Oral, DAILY, Grover Rodriguez M.D., 1 Tablet at 10/13/21 0526  •  senna-docusate (PERICOLACE or SENOKOT S) 8.6-50 MG per tablet 2 Tablet, 2 Tablet, Oral, BID **AND**  polyethylene glycol/lytes (MIRALAX) PACKET 1 Packet, 1 Packet, Oral, QDAY PRN **AND** magnesium hydroxide (MILK OF MAGNESIA) suspension 30 mL, 30 mL, Oral, QDAY PRN **AND** bisacodyl (DULCOLAX) suppository 10 mg, 10 mg, Rectal, QDAY PRN, Grover Rodriguez M.D.  •  acetaminophen (Tylenol) tablet 650 mg, 650 mg, Oral, Q6HRS PRN, Grover Rodrgiuez M.D.  •  enoxaparin (LOVENOX) inj 40 mg, 40 mg, Subcutaneous, DAILY, Grover Rodriguez M.D., 40 mg at 10/13/21 0526    Physical Examination:     Vitals:    10/13/21 0000 10/13/21 0400 10/13/21 0707 10/13/21 1128   BP: 147/79 (!) 162/88 154/98 141/85   Pulse: 61 (!) 59 63 73   Resp: 18 18 18 18   Temp: 36.7 °C (98 °F) 36.6 °C (97.8 °F) 36.8 °C (98.2 °F) 36.5 °C (97.7 °F)   TempSrc: Temporal Temporal Temporal Temporal   SpO2: 93% 94% 91% 93%   Weight:       Height:           General: Patient is awake and in no acute distress  Eyes: examination of optic disks not indicated at this time  CV: RRR    NEUROLOGICAL EXAM:     Mental status: Awake, alert and fully oriented, follows commands  Speech and language: speech is clear and fluent. The patient is able to name and repeat.  Cranial nerve exam: Pupils are equal, round and reactive to light bilaterally. Visual fields are full. Extraocular muscles are intact. Sensation in the face is intact to light touch. Slight R facial droop present. Hearing to finger rub equal. Palate elevates symmetrically. Shoulder shrug is full. Tongue is midline.  Motor exam: Strength is 5/5 in all extremities both distally and proximally. Tone is normal. Slight R arm drift.  Sensory exam: No sensory deficits identified   Deep tendon reflexes:  2+ and symmetric. Toes down-going bilaterally.  Coordination: no ataxia   Gait: deferred     Objective Data:    Labs:  Lab Results   Component Value Date/Time    PROTHROMBTM 16.8 (H) 10/12/2021 06:02 AM    INR 1.41 (H) 10/12/2021 06:02 AM      Lab Results   Component Value Date/Time    WBC 9.0 10/12/2021 06:02 AM    RBC 4.61 (L)  10/12/2021 06:02 AM    HEMOGLOBIN 14.9 10/12/2021 06:02 AM    HEMATOCRIT 44.2 10/12/2021 06:02 AM    MCV 95.9 10/12/2021 06:02 AM    MCH 32.3 10/12/2021 06:02 AM    MCHC 33.7 10/12/2021 06:02 AM    MPV 12.4 10/12/2021 06:02 AM    NEUTSPOLYS 50.00 10/12/2021 06:02 AM    LYMPHOCYTES 37.00 10/12/2021 06:02 AM    MONOCYTES 7.80 10/12/2021 06:02 AM    EOSINOPHILS 4.30 10/12/2021 06:02 AM    BASOPHILS 0.70 10/12/2021 06:02 AM      Lab Results   Component Value Date/Time    SODIUM 136 10/12/2021 06:02 AM    POTASSIUM 3.6 10/12/2021 06:02 AM    CHLORIDE 102 10/12/2021 06:02 AM    CO2 23 10/12/2021 06:02 AM    GLUCOSE 109 (H) 10/12/2021 06:02 AM    BUN 15 10/12/2021 06:02 AM    CREATININE 0.99 10/12/2021 06:02 AM      Lab Results   Component Value Date/Time    CHOLSTRLTOT 221 (H) 10/13/2021 01:45 AM     (H) 10/13/2021 01:45 AM    HDL 59 10/13/2021 01:45 AM    TRIGLYCERIDE 84 10/13/2021 01:45 AM       Lab Results   Component Value Date/Time    ALKPHOSPHAT 59 10/12/2021 06:02 AM    ASTSGOT 24 10/12/2021 06:02 AM    ALTSGPT 21 10/12/2021 06:02 AM    TBILIRUBIN 0.5 10/12/2021 06:02 AM        Imaging/Testing:    Our independent read:    Transthoracic echocardiogram 10/12/2021: No acute abnormalities, EF 60%, no patent foramen ovale noted     MRI brain without contrast, 10/12/2021: Per our independent read, there is an infarct in the left posterior limb of the internal capsule (PLIC) as well as a small punctate infarct in the left occipital region.     Assessment and Plan:    Alexey Caballero is a 76 y.o. male with relevant history ofhistory of CAD (s/p MI 2007, s/p stent x2, on ASA 81 MWF), obesity, hypercholesterolemia (not on statin), bilateral carotid artery stenosis, and HTN who presented to the hospital for right-sided weakness. Neurology was consulted to address stroke.       #Left PLIC infarct  #Left occipital punctate infarct  #Prediabetes  #Hyperlipidemia    Patient has symptomatic left carotid  stenosis.     -Consider following up with vascular surgery for symptomatic carotid stenosis  -Start patient on aspirin 81 mg daily rather than Monday Wednesday Friday; we have counseled the patient on the risks and benefits of daily aspirin therapy.  -Continue atorvastatin; if patient cannot tolerate due to myalgia side effect, consider switch to rosuvastatin  -Allow for permissive hypertension up to 220/120 mmHg until 10/14/2021 around 7:30 AM  -Encourage healthy diet  -Physical therapy, Occupational Therapy, speech and language pathology therapy  -Every 4 hour neurochecks  -Follow-up in [facility]    Thank you for this consult. We will sign off.    This chart was partially generated using voice recognition technology and sound alike word replacement may be present, best efforts were made to make the chart accurate.    Lenore Arias MD, MPH  [employer] [facility], PGY-2

## 2021-10-13 NOTE — CARE PLAN
Problem: Knowledge Deficit - Stroke Education  Goal: Patient's knowledge of stroke and risk factors will improve  Outcome: Progressing     Problem: Discharge Planning - Stroke  Goal: Patient’s continuum of care needs will be met  Outcome: Progressing     Problem: Neuro Status  Goal: Neuro status will remain stable or improve  Outcome: Progressing     Problem: Risk for Aspiration  Goal: Patient's risk for aspiration will be absent or decrease  Outcome: Progressing     Problem: Mobility - Stroke  Goal: Patient's capacity to carry out activities will improve  Outcome: Progressing   The patient is Stable - Low risk of patient condition declining or worsening    Shift Goals  Clinical Goals: pt/ot/discharge planning  Patient Goals: therapy  Family Goals: talk with MD, discharge plan    Progress made toward(s) clinical / shift goals:  Stable neuro status, Understands POC. Ambulating with assistance.      Patient is not progressing towards the following goals:        (covid 19 surge in effect)

## 2021-10-14 PROCEDURE — 97530 THERAPEUTIC ACTIVITIES: CPT | Mod: CQ

## 2021-10-14 PROCEDURE — A9270 NON-COVERED ITEM OR SERVICE: HCPCS | Performed by: INTERNAL MEDICINE

## 2021-10-14 PROCEDURE — 92526 ORAL FUNCTION THERAPY: CPT

## 2021-10-14 PROCEDURE — 97116 GAIT TRAINING THERAPY: CPT | Mod: CQ

## 2021-10-14 PROCEDURE — 700102 HCHG RX REV CODE 250 W/ 637 OVERRIDE(OP): Performed by: INTERNAL MEDICINE

## 2021-10-14 PROCEDURE — 700111 HCHG RX REV CODE 636 W/ 250 OVERRIDE (IP): Performed by: HOSPITALIST

## 2021-10-14 PROCEDURE — 99232 SBSQ HOSP IP/OBS MODERATE 35: CPT | Performed by: PHYSICAL MEDICINE & REHABILITATION

## 2021-10-14 PROCEDURE — 99232 SBSQ HOSP IP/OBS MODERATE 35: CPT | Performed by: INTERNAL MEDICINE

## 2021-10-14 PROCEDURE — 700102 HCHG RX REV CODE 250 W/ 637 OVERRIDE(OP): Performed by: HOSPITALIST

## 2021-10-14 PROCEDURE — A9270 NON-COVERED ITEM OR SERVICE: HCPCS | Performed by: HOSPITALIST

## 2021-10-14 PROCEDURE — 770020 HCHG ROOM/CARE - TELE (206)

## 2021-10-14 RX ADMIN — ASPIRIN 81 MG: 81 TABLET, CHEWABLE ORAL at 05:15

## 2021-10-14 RX ADMIN — ATORVASTATIN CALCIUM 80 MG: 80 TABLET, FILM COATED ORAL at 16:24

## 2021-10-14 RX ADMIN — Medication 1 TABLET: at 05:15

## 2021-10-14 RX ADMIN — ENOXAPARIN SODIUM 40 MG: 40 INJECTION SUBCUTANEOUS at 05:15

## 2021-10-14 ASSESSMENT — COGNITIVE AND FUNCTIONAL STATUS - GENERAL
SUGGESTED CMS G CODE MODIFIER MOBILITY: CK
MOVING TO AND FROM BED TO CHAIR: A LITTLE
WALKING IN HOSPITAL ROOM: A LITTLE
STANDING UP FROM CHAIR USING ARMS: A LITTLE
MOBILITY SCORE: 18
MOVING FROM LYING ON BACK TO SITTING ON SIDE OF FLAT BED: A LITTLE
CLIMB 3 TO 5 STEPS WITH RAILING: A LOT

## 2021-10-14 ASSESSMENT — PAIN DESCRIPTION - PAIN TYPE
TYPE: ACUTE PAIN

## 2021-10-14 ASSESSMENT — GAIT ASSESSMENTS
ASSISTIVE DEVICE: FRONT WHEEL WALKER
DEVIATION: ATAXIC
GAIT LEVEL OF ASSIST: MINIMAL ASSIST
DISTANCE (FEET): 100

## 2021-10-14 ASSESSMENT — ENCOUNTER SYMPTOMS
HEADACHES: 0
CHILLS: 0
DIZZINESS: 0
FEVER: 0
VOMITING: 0
NAUSEA: 0

## 2021-10-14 ASSESSMENT — FIBROSIS 4 INDEX: FIB4 SCORE: 1.7

## 2021-10-14 NOTE — PROGRESS NOTES
Physical Medicine and Rehabilitation Consultation              Date of initial consultation: 10/13/2021  Consulting provider: Grover Rodriguez MD  Reason for consultation: assess for acute inpatient rehab appropriateness  LOS: 2 Day(s)    Chief complaint: ataxia    HPI: The patient is a 76 y.o. right hand dominant male with a past medical history of of CAD,  MI 2007, s/p stent x2, obesity, hypercholesterolemia, carotid artery stenosis, HTN;  who presented on 10/12/2021  6:00 AM with right sided weakness, facial numbness, ataxia and slurred speech. Patient's son called EMS and patient was brought into the ED and seen by neurology, found to have an NIH score of 2. Patient did not receive TPA. MR brain found left sided infarcts in the frontal subcortical region, posterior limb internal capsule/corona radiata and occipital white matter.    The patient currently reports right-sided facial droop, some dysarthria, ataxia, slight right-sided weakness that is improving, intermittent headache.  Patient's son is at bedside who helps answer questions.  Son is able to provide 24/7 support.  Patient has a history of left forearm reconstruction and has limited use of his left hand in certain positions.    10/14/2021  Patient is improving with SLP, he is now on regular diet with thin liquids. Patients son remains very supportive. Patients dexterity is improving. No new complaints today. Awaiting bed availability.     ROS  Pertinent positives are mentioned in the HPI, all others reviewed and are negative.    Social Hx:  2 SH  2 TERRY - 1 FOS inside required, railing on right side when going up  With: son - supportive, able to assist     THERAPY:  Restrictions: Fall risk, swallow precautions   PT: Functional mobility   10/13: Walking 100' with FWW at min assist     OT: ADLs  10/13: Mod assist  LBD and grooming     SLP:   10/13: Possible occasional word retrieval difficulty. Pt taking sips of thin water using a straw without s/s of  "difficulty  10/14:  Regular - Easy to Chew (7);Thin (0) diet      IMAGING:    MR brain 10/12/21  Small foci of acute infarction in the left frontal subcortical region, left posterior limb internal capsule/corona radiata and left occipital white matter.  Remote left medial parietal, left coronal radiata and left basal ganglia lacunar infarcts.    PROCEDURES:  None    PMH:  Past Medical History:   Diagnosis Date   • Allergy    • Arthritis    • Blood transfusion without reported diagnosis    • Cancer (HCC)    • Heart attack (HCC)        PSH:  Past Surgical History:   Procedure Laterality Date   • ATHROPLASTY     • COLON RESECTION     • OPEN REDUCTION         FHX:  Family History   Problem Relation Age of Onset   • Arthritis Father    • Heart Disease Father    • Hyperlipidemia Father        Medications:  Current Facility-Administered Medications   Medication Dose   • aspirin (ASA) chewable tab 81 mg  81 mg   • atorvastatin (LIPITOR) tablet 80 mg  80 mg   • therapeutic multivitamin-minerals (THERAGRAN-M) tablet 1 Tablet  1 Tablet   • senna-docusate (PERICOLACE or SENOKOT S) 8.6-50 MG per tablet 2 Tablet  2 Tablet    And   • polyethylene glycol/lytes (MIRALAX) PACKET 1 Packet  1 Packet    And   • magnesium hydroxide (MILK OF MAGNESIA) suspension 30 mL  30 mL    And   • bisacodyl (DULCOLAX) suppository 10 mg  10 mg   • acetaminophen (Tylenol) tablet 650 mg  650 mg   • enoxaparin (LOVENOX) inj 40 mg  40 mg       Allergies:  Allergies   Allergen Reactions   • Tape Rash     Pt requests only paper tape as hypoallergenic tape gives him a severe rash.    • Statins [Hmg-Coa-R Inhibitors] Myalgia     Patient reports severe myalgia to multiple statins in the past.         Physical Exam:  Vitals: /98   Pulse 63   Temp 36.4 °C (97.5 °F) (Temporal)   Resp 18   Ht 1.727 m (5' 7.99\")   Wt 96.2 kg (212 lb 1.3 oz)   SpO2 95%   Gen: NAD  Head:  NC/AT  Eyes/ Nose/ Mouth: PERRLA, moist mucous membranes  Cardio: RRR, good distal " perfusion, warm extremities  Pulm: normal respiratory effort, no cyanosis   Abd: Soft NTND, negative borborygmi     Labs: Reviewed and significant for   Recent Labs     10/12/21  0602   RBC 4.61*   HEMOGLOBIN 14.9   HEMATOCRIT 44.2   PLATELETCT 241   PROTHROMBTM 16.8*   APTT 34.4   INR 1.41*     Recent Labs     10/12/21  0602   SODIUM 136   POTASSIUM 3.6   CHLORIDE 102   CO2 23   GLUCOSE 109*   BUN 15   CREATININE 0.99   CALCIUM 9.2     No results found for this or any previous visit (from the past 24 hour(s)).      ASSESSMENT:  Patient is a 76 y.o. male admitted with left-sided ischemic infarct in the posterior limb internal capsule now with right-sided deficits, dysarthria, ataxia.    [facility] Code / Diagnosis to Support: 0001.2 - Stroke: Right Body Involvement (Left Brain)    Rehabilitation: Impaired ADLs and mobility  Patient is a good candidate for inpatient rehab based on needs for PT, OT, and speech therapy.  Patient will also benefit from family training.  Patient has a good discharge situation which will be home with son.     Barriers to transfer include: Insurance authorization, TCCs to verify disposition, medical clearance and bed availability     Additional Recommendations:  -Good candidate for IPR.  Patient has functional deficits with mobility, ADLs and dysarthria.  He has good family support from his son.  He has a stable discharge environment.  Patient will need to work on stair training.  -Continue PT OT and SLP while in house  -Patient is agreeable to IPR, he is accepted to [facility] pending bed availability.  Please look for updates under the discharge tab.  -No neuropathic pain  -On aspirin 81 mg daily and Lipitor 80 mg nightly per neurology  - Control BP under 140/90  -PMR to follow in the periphery for rehab appropriateness, please reach out with questions or request for medical management    Medical Complexity:  Hypertension  Left ischemic stroke      DVT PPX: Lovenox 40 mg injection daily      Thank you  for allowing us to participate in the care of this patient.     Patient was seen for 27 minutes on unit/floor of which > 50% of time was spent on counseling and coordination of care regarding the above, including prognosis, risk reduction, benefits of treatment, and options for next stage of care.    Gavin Lujan, DO   Physical Medicine and Rehabilitation     Please note that this dictation was created using voice recognition software. I have made every reasonable attempt to correct obvious errors, but there may be errors of grammar and possibly content that I did not discover before finalizing the note.

## 2021-10-14 NOTE — THERAPY
Speech Language Pathology  Daily Treatment     Patient Name: Alexey Caballero  Age:  76 y.o., Sex:  male  Medical Record #: [id number]  Today's Date: 10/14/2021     Precautions  Precautions: (P) Fall Risk, Swallow Precautions ( See Comments)  Comments: right weakness    Assessment    Pt was seen for dysphagia tx at lunch with an EC7/TN0 meal tray.  Pt was AAOx4, sitting up in a chair for meal.  Pt with no noted word finding difficulty during session, and only slight to mild dysarthria was noted.  Pt self-corrected slurred words, with NO cueing given.  Pt consumed his entire meal (soft bite sized solids, easy to chew solids and thins) without any overt s/sx of aspiration.  Mastication was effective with all solids, no oral residue was noted and swallow initiation was timely.  Recommend to continue diet of regular, easy to chew solids (RG7) with thin liquids (TN0).      Plan  1) Continue diet of regular, easy to chew solids (RG7) with thin liquids (TN0)  2) Standby assistance for set up and monitoring     Continue current treatment plan.    Discharge Recommendations: Anticipate that the patient will have no further speech therapy needs for dysphagia after discharge from the hospital       Objective     10/14/21 1325   Verbal Expression   Comments appears WNL   Auditory Comprehension   Comments WNL   Cognitive-Linguistic   Level of Consciousness Alert   Orientation Level Oriented x 4   Speech / Dysarthria   Comments slight to mild dysarthria   Voice   Comments strong and clear   Dysphagia    Diet / Liquid Recommendation Regular - Easy to Chew (7);Thin (0)   Nutritional Liquid Intake Rating Scale Non thickened beverages   Nutritional Food Intake Rating Scale Total oral diet with multiple consistencies without special preparation but with specific food limitations   Skilled Intervention Compensatory Strategies   Recommended Route of Medication Administration   Medication Administration  Whole with Liquid Wash   Short  Term Goals   Short Term Goal # 1 Patient will consume meals of regular easy to chew solids and thin liquids with no s/sx of aspiration with independent use of safe swallow precautions.   Goal Outcome # 1 Progressing as expected

## 2021-10-14 NOTE — CARE PLAN
The patient is Stable - Low risk of patient condition declining or worsening    Shift Goals  Clinical Goals: maintain neuro status  Patient Goals: rest  Family Goals: JOHNATHAN    Progress made toward(s) clinical / shift goals:  Patient was educated on care plan, discharge plan, and stroke condition. He is able to verbalize his feelings on his condition. He is able to maintain a regular urinary output and is able to mobilize self in bed, brush his teeth and use a FWW with SBA to the bathroom.    Problem: Knowledge Deficit - Stroke Education  Goal: Patient's knowledge of stroke and risk factors will improve  Outcome: Progressing     Problem: Psychosocial - Patient Condition  Goal: Patient's ability to verbalize feelings about condition will improve  Outcome: Progressing     Problem: Urinary Elimination  Goal: Establish and maintain regular urinary output  Outcome: Progressing     Problem: Mobility - Stroke  Goal: Patient's capacity to carry out activities will improve  Outcome: Progressing       Patient is not progressing towards the following goals:

## 2021-10-14 NOTE — THERAPY
Physical Therapy   Daily Treatment     Patient Name: Alexey Caballero  Age:  76 y.o., Sex:  male  Medical Record #: [id number]  Today's Date: 10/14/2021     Precautions  Precautions: Fall Risk;Swallow Precautions ( See Comments)  Comments: right weak    Assessment    Pt pleasant and highly motivated to participate in therapy session. Pt completed bed mobility with spv and transfers with Kenyatta. vc for slow descend and hand placement. Focused on gait training and strength in RLE. Kenyatta for balance and safety. He presents with inconsistent step length and nbos. As well occasional dragging of RLE during swing phase requiring cues to improve. No overt lob but was independent prior to admission. Therefore would benefit from post acute placement at this time.     Plan    Continue current treatment plan.    DC Equipment Recommendations: Unable to determine at this time  Discharge Recommendations: Recommend post-acute placement for additional physical therapy services prior to discharge home         10/14/21 1415   Other Treatments   Other Treatments Provided answered questions about rehab pt and son had.    Balance   Sitting Balance (Static) Fair +   Sitting Balance (Dynamic) Fair +   Standing Balance (Static) Fair   Standing Balance (Dynamic) Fair -   Weight Shift Sitting Fair   Weight Shift Standing Fair   Comments with fww    Gait Analysis   Gait Level Of Assist Minimal Assist   Assistive Device Front Wheel Walker   Distance (Feet) 100   # of Times Distance was Traveled 2   Deviation Ataxic  (drags RLE, inconsistent amber )   Skilled Intervention Verbal Cuing;Sequencing   Comments hands on for balance and safety with fww. Dragging RLE with inconsistent step length, no lob.    Bed Mobility    Supine to Sit Supervised   Sit to Supine Supervised   Scooting Supervised   Comments no bed features at this time    Functional Mobility   Sit to Stand Minimal Assist   Bed, Chair, Wheelchair Transfer Minimal Assist   Skilled  Intervention Verbal Cuing   Comments Kenyatta for STS with vc for hand placement and slow descend.    Short Term Goals    Short Term Goal # 1 Patient will move sitting<>standing with supervision within 6tx in order to initiate transfers and gait   Goal Outcome # 1 goal not met   Short Term Goal # 2 Patient will ambulate 150ft with supervision within 6tx in order to access environment   Goal Outcome # 2 Goal not met   Short Term Goal # 3 Patient will ascend/descend FOS with supervision within 6tx in order to access bedroom/home   Goal Outcome # 3 Goal not met

## 2021-10-14 NOTE — THERAPY
Missed Therapy     Patient Name: Alexey Caballero  Age:  76 y.o., Sex:  male  Medical Record #: [id number]  Today's Date: 10/14/2021      Discussed missed therapy with RN, pt and family at bedside     10/14/21 1201   Interdisciplinary Plan of Care Collaboration   IDT Collaboration with  Nursing   Collaboration Comments Orders received for cognitive evaluation, however RN reports eval is no longer indicated as pt is no longer having word finding difficulty and is 100% intelligible.  Pt's son at bedside and confirms pt is at baseline in terms of cognition and language.  Please re-consult SLP if there is a change in status.

## 2021-10-14 NOTE — DISCHARGE PLANNING
Dr. Lujan is recommending [facility].  Dr. Quiñones has medically cleared.  Will discuss this case with the Admissions Team this morning.      1006-Unfortunately, I do not have a bed available today.  Will re-assess in the am.  Msg placed to Dr. Quiñones & Emelina PIPER.  I do apologize for the delay.

## 2021-10-14 NOTE — HOSPITAL COURSE
Mr. Caballero has a history of coronary artery disease with prior myocardial infarction.  His last encounter in her system was related to a left hand laceration, he was treated in the emergency room.     History taken from the patient and his son who is at the bedside.  Patient says that he has been in his normal state of health until he went to bed at approximately 1030 on the night prior to admission.  When he awakened in the morning he felt as though his right side was heavy he endorsed difficulty with coordination, some difficulty walking, and when he tried to speak he felt it was hard to find the words.  Patient had significant daily difficulty walking to the bathroom.  The patient's son evaluated and was concerned, he called EMS and patient as brought to the ER for evaluation.  Since arrival they both felt the patient's speech was improved but not back to normal.    MRI brain revealed a small acute infarction as noted below. The patient was treated with medical management and permissive hypertension. After that period, the patient remained hypertensive and was started on oral antihypertensive medications. Physiatry was consulted and determined that the patient was a good candidate for inpatient rehab.

## 2021-10-14 NOTE — PROGRESS NOTES
Monitor summary: SB/SR 54-81, MO 0.21, QRS 0.09, QT 0.37, with occasional PVCs and bigeminy per strip from monitor room.

## 2021-10-15 ENCOUNTER — PATIENT OUTREACH (OUTPATIENT)
Dept: HEALTH INFORMATION MANAGEMENT | Facility: OTHER | Age: 76
End: 2021-10-15

## 2021-10-15 PROBLEM — I10 ESSENTIAL HYPERTENSION: Status: ACTIVE | Noted: 2017-03-17

## 2021-10-15 PROCEDURE — 700111 HCHG RX REV CODE 636 W/ 250 OVERRIDE (IP): Performed by: HOSPITALIST

## 2021-10-15 PROCEDURE — 770001 HCHG ROOM/CARE - MED/SURG/GYN PRIV*

## 2021-10-15 PROCEDURE — 99232 SBSQ HOSP IP/OBS MODERATE 35: CPT | Performed by: INTERNAL MEDICINE

## 2021-10-15 PROCEDURE — 700102 HCHG RX REV CODE 250 W/ 637 OVERRIDE(OP): Performed by: HOSPITALIST

## 2021-10-15 PROCEDURE — 97530 THERAPEUTIC ACTIVITIES: CPT | Mod: CQ

## 2021-10-15 PROCEDURE — A9270 NON-COVERED ITEM OR SERVICE: HCPCS | Performed by: HOSPITALIST

## 2021-10-15 PROCEDURE — A9270 NON-COVERED ITEM OR SERVICE: HCPCS | Performed by: INTERNAL MEDICINE

## 2021-10-15 PROCEDURE — 700102 HCHG RX REV CODE 250 W/ 637 OVERRIDE(OP): Performed by: INTERNAL MEDICINE

## 2021-10-15 PROCEDURE — 97116 GAIT TRAINING THERAPY: CPT | Mod: CQ

## 2021-10-15 PROCEDURE — 97535 SELF CARE MNGMENT TRAINING: CPT | Mod: CO

## 2021-10-15 RX ORDER — M-VIT,TX,IRON,MINS/CALC/FOLIC 27MG-0.4MG
1 TABLET ORAL DAILY
Status: CANCELLED | OUTPATIENT
Start: 2021-10-16

## 2021-10-15 RX ORDER — ATORVASTATIN CALCIUM 80 MG/1
80 TABLET, FILM COATED ORAL EVERY EVENING
Status: CANCELLED | OUTPATIENT
Start: 2021-10-15

## 2021-10-15 RX ORDER — LOSARTAN POTASSIUM 50 MG/1
25 TABLET ORAL
Status: DISCONTINUED | OUTPATIENT
Start: 2021-10-15 | End: 2021-10-16 | Stop reason: HOSPADM

## 2021-10-15 RX ORDER — ASPIRIN 81 MG/1
81 TABLET, CHEWABLE ORAL DAILY
Status: CANCELLED | OUTPATIENT
Start: 2021-10-16

## 2021-10-15 RX ADMIN — LOSARTAN POTASSIUM 25 MG: 50 TABLET, FILM COATED ORAL at 16:55

## 2021-10-15 RX ADMIN — ASPIRIN 81 MG: 81 TABLET, CHEWABLE ORAL at 04:32

## 2021-10-15 RX ADMIN — ENOXAPARIN SODIUM 40 MG: 40 INJECTION SUBCUTANEOUS at 04:33

## 2021-10-15 RX ADMIN — Medication 1 TABLET: at 04:32

## 2021-10-15 RX ADMIN — ATORVASTATIN CALCIUM 80 MG: 80 TABLET, FILM COATED ORAL at 16:41

## 2021-10-15 ASSESSMENT — COGNITIVE AND FUNCTIONAL STATUS - GENERAL
SUGGESTED CMS G CODE MODIFIER DAILY ACTIVITY: CK
TOILETING: A LITTLE
MOBILITY SCORE: 19
DRESSING REGULAR LOWER BODY CLOTHING: A LITTLE
DRESSING REGULAR UPPER BODY CLOTHING: A LITTLE
STANDING UP FROM CHAIR USING ARMS: A LITTLE
CLIMB 3 TO 5 STEPS WITH RAILING: A LITTLE
PERSONAL GROOMING: A LITTLE
EATING MEALS: A LITTLE
HELP NEEDED FOR BATHING: A LITTLE
MOVING FROM LYING ON BACK TO SITTING ON SIDE OF FLAT BED: A LITTLE
SUGGESTED CMS G CODE MODIFIER MOBILITY: CK
MOVING TO AND FROM BED TO CHAIR: A LITTLE
DAILY ACTIVITIY SCORE: 18
WALKING IN HOSPITAL ROOM: A LITTLE

## 2021-10-15 ASSESSMENT — GAIT ASSESSMENTS
GAIT LEVEL OF ASSIST: MINIMAL ASSIST
ASSISTIVE DEVICE: FRONT WHEEL WALKER
DISTANCE (FEET): 75

## 2021-10-15 ASSESSMENT — ENCOUNTER SYMPTOMS
DIZZINESS: 0
VOMITING: 0
FEVER: 0
NAUSEA: 0
CHILLS: 0
HEADACHES: 0

## 2021-10-15 ASSESSMENT — PAIN DESCRIPTION - PAIN TYPE
TYPE: ACUTE PAIN

## 2021-10-15 NOTE — PROGRESS NOTES
Monitor summary: SB/SR 51-87, NE 0.18, QRS 0.10, QT 0.38, with occasional PVCs, bigeminy and trigeminy per strip from monitor room.

## 2021-10-15 NOTE — PROGRESS NOTES
Monitor summary: SR/SB 52-73, MS .18, QRS .10, QT .38, with occassional PVCs, PACs, and trigem per strip from monitor room.

## 2021-10-15 NOTE — THERAPY
Occupational Therapy  Daily Treatment     Patient Name: Alexey Caballero  Age:  76 y.o., Sex:  male  Medical Record #: [id number]  Today's Date: 10/15/2021       Precautions: Fall Risk, Swallow Precautions ( See Comments)      Assessment    Pt seen for OT tx. Continues to be limited by decreased activity tolerance, balance deficits, RUE weakness and poor safety awareness impacting ability to complete ADLs and ADL transfers independently. Up in chair upon arrival. Min A sit > stand, min A amb in room for balance and safety. Min A standing at sink. R hand weakness and poor coordination. Will continue to benefit from OT services while in house.     Plan    Continue current treatment plan.    DC Equipment Recommendations: Unable to determine at this time  Discharge Recommendations: Recommend post-acute placement for additional occupational therapy services prior to discharge home       10/15/21 0938   Cognition    Cognition / Consciousness X   Comments pleasant and cooperative, poor safety awareness    Active ROM Upper Body   Active ROM Upper Body  WDL   Strength Upper Body   Upper Body Strength  WDL   Balance   Sitting Balance (Static) Fair +   Sitting Balance (Dynamic) Fair +   Standing Balance (Static) Fair   Standing Balance (Dynamic) Fair -   Weight Shift Sitting Fair   Weight Shift Standing Fair   Bed Mobility    Comments up in chair pre and post session    Activities of Daily Living   Grooming Minimal Assist;Standing   Upper Body Dressing Minimal Assist   Lower Body Dressing Moderate Assist   Toileting Minimal Assist   Functional Mobility   Sit to Stand Minimal Assist   Bed, Chair, Wheelchair Transfer Minimal Assist   Toilet Transfers Minimal Assist   Short Term Goals   Short Term Goal # 1 Pt will demo UB dressing supervison   Goal Outcome # 1 Progressing as expected   Short Term Goal # 2 Pt will integrate RUE during functional tasks without vc   Goal Outcome # 2 Progressing as expected   Short Term Goal # 3 Pt  will tolerate 10 min standing G/H with supervison   Goal Outcome # 3 Progressing as expected   Short Term Goal # 4 Pt will participate in HEP to improve RUE strength/coordination   Goal Outcome # 4 Progressing as expected   Anticipated Discharge Equipment and Recommendations   DC Equipment Recommendations Unable to determine at this time   Discharge Recommendations Recommend post-acute placement for additional occupational therapy services prior to discharge home

## 2021-10-15 NOTE — PROGRESS NOTES
[facility] Medicine Daily Progress Note    Date of Service  10/15/2021    Chief Complaint  Alexey Caballero is a 76 y.o. male admitted 10/12/2021 with right sided weakness and difficulty speaking.    Hospital Course  Mr. Caballero has a history of coronary artery disease with prior myocardial infarction.  His last encounter in her system was related to a left hand laceration, he was treated in the emergency room.     History taken from the patient and his son who is at the bedside.  Patient says that he has been in his normal state of health until he went to bed at approximately 1030 last night.  When he awakened this morning at approximately  AM he felt as though his right side was heavy he endorses difficulty with coordination, some difficulty walking, and when he tried to speak he felt it was hard to find the words.  Patient has significant daily difficulty walking to the bathroom.  The patient's son evaluated and was concerned, he called EMS and patient as brought to the ER for evaluation.  Since arrival here they both feel the patient's speech is improved but is not back to normal.  Patient feels the symptoms on his right side are not improving.      Interval Problem Update  Appreciate physiatry consult, good candidate per their evaluation. Plan for transfer tomorrow.  No new complaints.   Hypertensive today. Start losartan.    I have personally seen and examined the patient at bedside. I discussed the plan of care with patient, bedside RN, charge RN,  and pharmacy.    Consultants/Specialty  neurology and physiatry    Code Status  Full Code    Disposition  Patient is medically cleared pending bed availability.   Anticipate discharge to to an inpatient rehabilitation hospital.  I have placed the appropriate orders for post-discharge needs.    Review of Systems  Review of Systems   Constitutional: Negative for chills and fever.   Gastrointestinal: Negative for nausea and vomiting.   Neurological:  Negative for dizziness and headaches.   All other systems reviewed and are negative.       Physical Exam  Temp:  [36.3 °C (97.3 °F)-36.4 °C (97.5 °F)] 36.3 °C (97.4 °F)  Pulse:  [53-73] 59  Resp:  [16-18] 16  BP: (127-169)/() 155/80  SpO2:  [92 %-95 %] 94 %    Physical Exam  Vitals and nursing note reviewed.   Constitutional:       General: He is not in acute distress.  HENT:      Head: Normocephalic and atraumatic.      Right Ear: External ear normal.      Left Ear: External ear normal.      Nose: Nose normal.      Mouth/Throat:      Mouth: Mucous membranes are moist.      Pharynx: Oropharynx is clear.   Eyes:      General: No scleral icterus.     Conjunctiva/sclera: Conjunctivae normal.   Cardiovascular:      Rate and Rhythm: Normal rate and regular rhythm.   Pulmonary:      Effort: Pulmonary effort is normal.      Breath sounds: Normal breath sounds.   Abdominal:      Palpations: Abdomen is soft.      Tenderness: There is no abdominal tenderness. There is no guarding.   Musculoskeletal:         General: No swelling or deformity.      Cervical back: Normal range of motion and neck supple.   Skin:     General: Skin is warm and dry.   Neurological:      General: No focal deficit present.      Mental Status: He is alert and oriented to person, place, and time.   Psychiatric:         Mood and Affect: Mood normal.         Behavior: Behavior normal.         Fluids    Intake/Output Summary (Last 24 hours) at 10/15/2021 1643  Last data filed at 10/15/2021 0800  Gross per 24 hour   Intake 970 ml   Output --   Net 970 ml       Laboratory                  Recent Labs     10/13/21  0145   TRIGLYCERIDE 84   HDL 59   *       Imaging  MR-BRAIN-W/O   Final Result         Small foci of acute infarction in the left frontal subcortical region, left posterior limb internal capsule/corona radiata and left occipital white matter.      Remote left medial parietal, left coronal radiata and left basal ganglia lacunar  infarcts.      EC-ECHOCARDIOGRAM COMPLETE W/O CONT   Final Result      DX-CHEST-PORTABLE (1 VIEW)   Final Result      Mild cardiomegaly      CT-CTA NECK WITH & W/O-POST PROCESSING   Final Result      1.  Left carotid arterial bifurcation atherosclerotic plaque results in moderate (50-70% diameter) stenosis.      2.  Right carotid arterial bifurcation atherosclerotic plaque results in mild (less than 50% diameter) stenosis.      3.  Patent vertebral arteries.      CT-CTA HEAD WITH & W/O-POST PROCESS   Final Result      No evidence of intracranial vascular occlusion or aneurysm.      CT-CEREBRAL PERFUSION ANALYSIS   Final Result      1.  Cerebral blood flow less than 30% likely representing completed infarct = 0 mL.      2.  T Max more than 6 seconds likely representing combination of completed infarct and ischemia = 0 mL.      3.  Mismatched volume likely representing ischemic brain/penumbra = None      4.  Please note that the cerebral perfusion was performed on the limited brain tissue around the basal ganglia region. Infarct/ischemia outside the CT perfusion sections can be missed in this study.      CT-HEAD W/O   Final Result      No evidence of acute intracranial process.           Assessment/Plan  * Ischemic stroke (HCC)- (present on admission)  Assessment & Plan  Patient has acute onset of focal neurologic symptoms  CT imaging is unremarkable with the exception of carotid stenosis  Mostly diagnosis is a stroke  Continuous hemodynamic monitoring on telemetry  MRI brain indicative of stroke  Echocardiogram without septal abnormality  Continue high intensity statin  Continue antiplatelet therapy  Continue PT/OT/Speech  Appreciate physiatry consult, good candidate per their evaluation. Awaiting bed availability. Plan for transfer tomorrow.  Follow up in stroke bridge clinic.    Bilateral carotid artery stenosis- (present on admission)  Assessment & Plan  Continue aspirin and atorvastatin    Aphasia- (present on  admission)  Assessment & Plan  Continue speech therapy    Pure hypercholesterolemia- (present on admission)  Assessment & Plan  Patient was previously with a statin but stopped taking in the last few years  Continue atorvastatin 80 for acute stroke    Obesity (BMI 30-39.9)- (present on admission)  Assessment & Plan  Body mass index is 33.29 kg/m².   No acute interventions. Recommend lifestyle changes and outpatient follow up.    Essential hypertension- (present on admission)  Assessment & Plan  Out of window for permissive HTN. Hypertensive today. Start losartan.    Coronary artery disease involving native coronary artery of native heart without angina pectoris- (present on admission)  Assessment & Plan  History of coronary artery disease with myocardial infarction 2007  Patient received 2 stents in  [place]  Continue aspirin, start atorvastatin       VTE prophylaxis: SCDs/TEDs and enoxaparin ppx    I have performed a physical exam and reviewed and updated ROS and Plan today (10/15/2021). In review of yesterday's note (10/14/2021), there are no changes except as documented above.

## 2021-10-15 NOTE — DISCHARGE PLANNING
Would appreciate an updated OT eval once appropriate.  Do not anticipate a bed opening up until tomorrow.  Alexey remains medically cleared.  Msg placd to Dr. Quiñones & Emelina PIPER.  I do apologize for the delay.     1046-Unfortunately, I do not have a bed available today.  Anticipate an available bed tomorrow.  I do apologize for the delay.  Msg placed to Dr. Quiñones & Emelina PIPER.    1218-Dr. Travis has accepted Alexey.  Transport has been arranged via [facility] transport for tomorrow @ 1200.  Dr. Quiñones, Guillaume calhoun & Dorys BEARD are aware.  Msg placed to Emelina PIPER.

## 2021-10-15 NOTE — PROGRESS NOTES
[facility] Medicine Daily Progress Note    Date of Service  10/14/2021    Chief Complaint  Alexey Caballero is a 76 y.o. male admitted 10/12/2021 with right sided weakness and difficulty speaking.    Hospital Course  Mr. Caballero has a history of coronary artery disease with prior myocardial infarction.  His last encounter in her system was related to a left hand laceration, he was treated in the emergency room.     History taken from the patient and his son who is at the bedside.  Patient says that he has been in his normal state of health until he went to bed at approximately 1030 last night.  When he awakened this morning at approximately  AM he felt as though his right side was heavy he endorses difficulty with coordination, some difficulty walking, and when he tried to speak he felt it was hard to find the words.  Patient has significant daily difficulty walking to the bathroom.  The patient's son evaluated and was concerned, he called EMS and patient as brought to the ER for evaluation.  Since arrival here they both feel the patient's speech is improved but is not back to normal.  Patient feels the symptoms on his right side are not improving.      Interval Problem Update  Appreciate physiatry consult, good candidate per their evaluation.  No new complaints.   Discontinue permissive HTN. Normotensive.    I have personally seen and examined the patient at bedside. I discussed the plan of care with patient, bedside RN, charge RN,  and pharmacy.    Consultants/Specialty  neurology and physiatry    Code Status  Full Code    Disposition  Patient is medically cleared pending bed availability.   Anticipate discharge to to an inpatient rehabilitation hospital.  I have placed the appropriate orders for post-discharge needs.    Review of Systems  Review of Systems   Constitutional: Negative for chills and fever.   Gastrointestinal: Negative for nausea and vomiting.   Neurological: Negative for dizziness and  headaches.   All other systems reviewed and are negative.       Physical Exam  Temp:  [36.3 °C (97.3 °F)-36.6 °C (97.8 °F)] 36.6 °C (97.8 °F)  Pulse:  [60-70] 63  Resp:  [16-18] 16  BP: (132-154)/(74-98) 140/85  SpO2:  [91 %-96 %] 95 %    Physical Exam  Vitals and nursing note reviewed.   Constitutional:       General: He is not in acute distress.  HENT:      Head: Normocephalic and atraumatic.      Right Ear: External ear normal.      Left Ear: External ear normal.      Nose: Nose normal.      Mouth/Throat:      Mouth: Mucous membranes are moist.      Pharynx: Oropharynx is clear.   Eyes:      General: No scleral icterus.     Conjunctiva/sclera: Conjunctivae normal.   Cardiovascular:      Rate and Rhythm: Normal rate and regular rhythm.   Pulmonary:      Effort: Pulmonary effort is normal.      Breath sounds: Normal breath sounds.   Abdominal:      Palpations: Abdomen is soft.      Tenderness: There is no abdominal tenderness. There is no guarding.   Musculoskeletal:         General: No swelling or deformity.      Cervical back: Normal range of motion and neck supple.   Skin:     General: Skin is warm and dry.   Neurological:      General: No focal deficit present.      Mental Status: He is alert and oriented to person, place, and time.   Psychiatric:         Mood and Affect: Mood normal.         Behavior: Behavior normal.         Fluids    Intake/Output Summary (Last 24 hours) at 10/14/2021 1800  Last data filed at 10/14/2021 0500  Gross per 24 hour   Intake 250 ml   Output --   Net 250 ml       Laboratory  Recent Labs     10/12/21  0602   WBC 9.0   RBC 4.61*   HEMOGLOBIN 14.9   HEMATOCRIT 44.2   MCV 95.9   MCH 32.3   MCHC 33.7   RDW 45.2   PLATELETCT 241   MPV 12.4     Recent Labs     10/12/21  0602   SODIUM 136   POTASSIUM 3.6   CHLORIDE 102   CO2 23   GLUCOSE 109*   BUN 15   CREATININE 0.99   CALCIUM 9.2     Recent Labs     10/12/21  0602   APTT 34.4   INR 1.41*         Recent Labs     10/13/21  0145    TRIGLYCERIDE 84   HDL 59   *       Imaging  MR-BRAIN-W/O   Final Result         Small foci of acute infarction in the left frontal subcortical region, left posterior limb internal capsule/corona radiata and left occipital white matter.      Remote left medial parietal, left coronal radiata and left basal ganglia lacunar infarcts.      EC-ECHOCARDIOGRAM COMPLETE W/O CONT   Final Result      DX-CHEST-PORTABLE (1 VIEW)   Final Result      Mild cardiomegaly      CT-CTA NECK WITH & W/O-POST PROCESSING   Final Result      1.  Left carotid arterial bifurcation atherosclerotic plaque results in moderate (50-70% diameter) stenosis.      2.  Right carotid arterial bifurcation atherosclerotic plaque results in mild (less than 50% diameter) stenosis.      3.  Patent vertebral arteries.      CT-CTA HEAD WITH & W/O-POST PROCESS   Final Result      No evidence of intracranial vascular occlusion or aneurysm.      CT-CEREBRAL PERFUSION ANALYSIS   Final Result      1.  Cerebral blood flow less than 30% likely representing completed infarct = 0 mL.      2.  T Max more than 6 seconds likely representing combination of completed infarct and ischemia = 0 mL.      3.  Mismatched volume likely representing ischemic brain/penumbra = None      4.  Please note that the cerebral perfusion was performed on the limited brain tissue around the basal ganglia region. Infarct/ischemia outside the CT perfusion sections can be missed in this study.      CT-HEAD W/O   Final Result      No evidence of acute intracranial process.           Assessment/Plan  * Ischemic stroke (HCC)- (present on admission)  Assessment & Plan  Patient has acute onset of focal neurologic symptoms  CT imaging is unremarkable with the exception of carotid stenosis  Mostly diagnosis is a stroke  Continuous hemodynamic monitoring on telemetry  MRI brain indicative of stroke  Echocardiogram without septal abnormality  Continue high intensity statin  Continue antiplatelet  therapy  PT/OT/Speech  Appreciate physiatry consult, good candidate per their evaluation. Awaiting bed availability.  Follow up in stroke bridge clinic.    Bilateral carotid artery stenosis- (present on admission)  Assessment & Plan  Continue aspirin and atorvastatin    Aphasia- (present on admission)  Assessment & Plan  Continue speech therapy    Pure hypercholesterolemia- (present on admission)  Assessment & Plan  Patient was previously with a statin but stopped taking in the last few years  Continue atorvastatin 80 for acute stroke    Obesity (BMI 30-39.9)- (present on admission)  Assessment & Plan  Body mass index is 33.29 kg/m².   No acute interventions. Recommend lifestyle changes and outpatient follow up.    Coronary artery disease involving native coronary artery of native heart without angina pectoris- (present on admission)  Assessment & Plan  History of coronary artery disease with myocardial infarction 2007  Patient received 2 stents in  [place]  Continue aspirin, start atorvastatin       VTE prophylaxis: SCDs/TEDs and enoxaparin ppx    I have performed a physical exam and reviewed and updated ROS and Plan today (10/14/2021). In review of yesterday's note (10/13/2021), there are no changes except as documented above.

## 2021-10-15 NOTE — THERAPY
Physical Therapy   Daily Treatment     Patient Name: Alexey Caballero  Age:  76 y.o., Sex:  male  Medical Record #: [id number]  Today's Date: 10/15/2021     Precautions  Precautions: Fall Risk;Swallow Precautions ( See Comments)  Comments: right weak     Assessment    Pt was pleasant and agreeable to therapy session. Continues to be mainly limited due to right lower extremity weakness. He continues to present with decreased height through swing phase of RLE which at times can increase his risk for falling. He was able to trial 3 steps with Kenyatta and hand rail. Pt with poor carryover of proper technique on stairs but did not demonstrate LOB at this time. He continues to present below his baseline and would benefit from post acute placement.     Plan    Continue current treatment plan.    DC Equipment Recommendations: Unable to determine at this time  Discharge Recommendations: Recommend post-acute placement for additional physical therapy services prior to discharge home         10/15/21 0950   Balance   Sitting Balance (Static) Fair +   Sitting Balance (Dynamic) Fair +   Standing Balance (Static) Fair   Standing Balance (Dynamic) Fair -   Weight Shift Sitting Fair   Weight Shift Standing Fair   Comments with fww continues to have decreased sensation and strength on RLE    Gait Analysis   Gait Level Of Assist Minimal Assist   Assistive Device Front Wheel Walker   Distance (Feet) 75   # of Times Distance was Traveled 2   # of Stairs Climbed 3   Level of Assist with Stairs Minimal Assist   Skilled Intervention Verbal Cuing;Sequencing;Tactile Cuing   Comments Kenyatta for gait training continues to present with anatalgic gait and decreased height on RLE during swing phase. When fatigued will drag RLE increasing risk for falling. Completed three stairs poor carryover of proper technique    Bed Mobility    Supine to Sit   (just finished with OT up in chair )   Sit to Supine   (left up in chair )   Scooting Supervised    Functional Mobility   Sit to Stand Maximal Assist   Bed, Chair, Wheelchair Transfer Minimal Assist   Skilled Intervention Verbal Cuing;Sequencing   Comments Kenyatta for safety and proper techniqeu during transfer    Short Term Goals    Short Term Goal # 1 Patient will move sitting<>standing with supervision within 6tx in order to initiate transfers and gait   Goal Outcome # 1 Progressing as expected   Short Term Goal # 2 Patient will ambulate 150ft with supervision within 6tx in order to access environment   Goal Outcome # 2 Progressing as expected   Short Term Goal # 3 Patient will ascend/descend FOS with supervision within 6tx in order to access bedroom/home   Goal Outcome # 3 Goal not met

## 2021-10-15 NOTE — PROGRESS NOTES
"Had an in depth discussion with pt and son about \"statin\" allergy. Per patient simvastatin is the best option with the least amount of side-effects. Pt currently prescribed atorvastatin, pt willing to currently take what is prescribe but would like medication changed to simvastatin. Notified MD. Dr. Quiñones at bedside per family request.   "

## 2021-10-15 NOTE — PREADMISSION SCREENING NOTE
Pre-Admission Screening Form    Patient Information:   Name: Alexey Caballero     MRN: [id number]       : 1945      Age: 76 y.o.   Gender: male      Race: White [7]       Marital Status: Single [1]  Family Contact: Guillaume Caballero        Relationship: Son [15]  Home Phone: [phone]           Cell Phone: [phone]  Advanced Directives: Yes  Code Status:  FULL  Current Attending Provider: Chelle Quiñones M.D.  Referring Physician: Grover French      Physiatrist Consult: Dr. Lujan       Referral Date: 10/12/21  Primary Payor Source:  MEDICARE  Secondary Payor Source:  [facility]    Medical Information:   Date of Admission to Acute Care Setting:10/12/2021  Room Number: S194/02  Rehabilitation Diagnosis: 0001.2 - Stroke: Right Body Involvement (Left Brain)  Immunization History   Administered Date(s) Administered   • Tdap Vaccine 2019     Allergies   Allergen Reactions   • Tape Rash     Pt requests only paper tape as hypoallergenic tape gives him a severe rash.    • Statins [Hmg-Coa-R Inhibitors] Myalgia     Patient reports severe myalgia to multiple statins in the past.     Past Medical History:   Diagnosis Date   • Allergy    • Arthritis    • Blood transfusion without reported diagnosis    • Cancer (HCC)    • Heart attack (HCC)      Past Surgical History:   Procedure Laterality Date   • ATHROPLASTY     • COLON RESECTION     • OPEN REDUCTION         History Leading to Admission, Conditions that Caused the Need for Rehab (CMS):     H&P 10/12 (Dr Rodriguez):    Chief Complaint   Patient presents with   • Possible Stroke       PT BIB [facility] last seen normal at 2230 and awoke with heavy right side and slurred speech.  PT with several GLF this AM         History of Presenting Illness  Alexey Caballero is a 76 y.o. male who presented 10/12/2021 with right sided weakness and difficulty speaking.     I have reviewed some of the recent provider documentation available to me in the patient's medical chart.   Records are briefly summarized: Mr. Caballero has a history of coronary artery disease with prior myocardial infarction.  His last encounter in her system was related to a left hand laceration, he was treated in the emergency room.     History taken from the patient and his son who is at the bedside.  Patient says that he has been in his normal state of health until he went to bed at approximately 1030 last night.  When he awakened this morning at approximately  AM he felt as though his right side was heavy he endorses difficulty with coordination, some difficulty walking, and when he tried to speak he felt it was hard to find the words.  Patient has significant daily difficulty walking to the bathroom.  The patient's son evaluated and was concerned, he called EMS and patient as brought to the ER for evaluation.  Since arrival here they both feel the patient's speech is improved but is not back to normal.  Patient feels the symptoms on his right side are not improving.    Assessment/Plan:  I anticipate this patient will require at least two midnights for appropriate medical management, necessitating inpatient admission.     * Ischemic stroke (HCC)- (present on admission)  Assessment & Plan  Patient has acute onset of focal neurologic symptoms  CT imaging is unremarkable with the exception of carotid stenosis  Mostly diagnosis is a stroke  Admit to neurology floor for workup and treatment  Continuous hemodynamic monitoring on telemetry  MRI brain, echocardiogram  High intensity statin  Antiplatelet therapy  PT/OT/Speech     Bilateral carotid artery stenosis- (present on admission)  Assessment & Plan  Intervention may be warranted if stroke is proven on MRI  Aspirin and atorvastatin     Aphasia- (present on admission)  Assessment & Plan  Speech therapy     Pure hypercholesterolemia- (present on admission)  Assessment & Plan  Patient was previously with a statin but stopped taking in the last few years  Start atorvastatin  80 for acute stroke     Obesity (BMI 30-39.9)- (present on admission)  Assessment & Plan  Body mass index is 33.29 kg/m².     Coronary artery disease involving native coronary artery of native heart without angina pectoris- (present on admission)  Assessment & Plan  History of coronary artery disease with myocardial infarction 2007  Patient received 2 stents in  [place]  Continue aspirin, start atorvastatin        VTE prophylaxis: SCDs/TEDs      Neurology Recommendations 10/12 (Dr Arias):  Assessment and Plan:     Patient is a 76 y.o. male with history of CAD (s/p MI 2007, s/p stent x2, on ASA 81 MWF), obesity, hypercholesterolemia (not on statin), bilateral carotid artery stenosis, and HTN who presented to the hospital for right-sided weakness. Neurology was consulted to address stroke.     #Left PLIC infarct  #Left occipital punctate infarct     These correspond to the patient's symptoms.  Latest NIHSS is 2.  TTE was unremarkable.  Patient has symptomatic left carotid stenosis.     -Consider following up with vascular surgery for symptomatic carotid stenosis  -Follow-up on hemoglobin A1c and lipid profile  -Start patient on aspirin 81 mg daily rather than Monday Wednesday Friday; we have counseled the patient on the risks and benefits of daily aspirin therapy.  -Continue atorvastatin; if patient cannot tolerate due to myalgia side effect, consider switch to rosuvastatin  -Allow for permissive hypertension up to 220/120 mmHg until 10/14/2021 around 7:30 AM  -Physical therapy, Occupational Therapy, speech and language pathology therapy  -Every 4 hour neurochecks  -Follow-up in [facility]      Physiatry Recommendations 10/13 (Dr Lujan)  ASSESSMENT:  Patient is a 76 y.o. male admitted with left-sided ischemic infarct in the posterior limb internal capsule now with right-sided deficits, dysarthria, ataxia.     [facility] Code / Diagnosis to Support: 0001.2 - Stroke: Right Body Involvement (Left  Brain)     Rehabilitation: Impaired ADLs and mobility  Patient is a good candidate for inpatient rehab based on needs for PT, OT, and speech therapy.  Patient will also benefit from family training.  Patient has a good discharge situation which will be home with son.      Barriers to transfer include: Insurance authorization, TCCs to verify disposition, medical clearance and bed availability      Additional Recommendations:  -Good candidate for IPR.  Patient has functional deficits with mobility, ADLs and dysarthria.  He has good family support from his son.  He has a stable discharge environment.  Patient will need to work on stair training.  -Continue PT OT and SLP while in house  -Patient is agreeable to IPR, could possibly admit tomorrow 10/14 pending bed availability.  Please look for updates under the discharge tab.  -No neuropathic pain  -On aspirin 81 mg daily and Lipitor 80 mg nightly per neurology  -Currently allowed for permissive hypertension, afterwards would appreciate recommendations on BP control under 140/90  -PMR to follow in the periphery for rehab appropriateness, please reach out with questions or request for medical management     Medical Complexity:  Hypertension  Left ischemic stroke        DVT PPX: Lovenox 40 mg injection daily    Co-morbidities: See PMH  Potential Risk - Complications: Cognitive Impairment, Dysphagia, Malnutrition, Pain, Perceptual Impairment, Pneumonia, Pressure Ulcer and Seizures  Level of Risk: High    Ongoing Medical Management Needed (Medical/Nursing Needs):   Patient Active Problem List    Diagnosis Date Noted   • Ischemic stroke (HCC) 10/12/2021   • Aphasia 10/12/2021   • Bilateral carotid artery stenosis 10/12/2021   • Seborrheic keratosis 12/12/2017   • Pure hypercholesterolemia 12/12/2017   • History of MI (myocardial infarction) 03/17/2017   • Coronary artery disease involving native coronary artery of native heart without angina pectoris 03/17/2017   • Elevated  "blood pressure 03/17/2017   • History of colonoscopy with polypectomy 03/17/2017   • Obesity (BMI 30-39.9) 03/17/2017     Alert  Current Vital Signs:   Temperature: 36.3 °C (97.4 °F) Pulse: 73 Respiration: 16 Blood Pressure : 137/91  Weight: 96.2 kg (212 lb 1.3 oz) Height: 172.7 cm (5' 7.99\")  Pulse Oximetry: 92 % O2 (LPM): 0      Completed Laboratory Reports:  No results for input(s): WBC, HEMOGLOBIN, HEMATOCRIT, PLATELETCT, SODIUM, POTASSIUM, BUN, CREATININE, ALBUMIN, GLUCOSE, POCGLUCOSE, INR in the last 72 hours.  Additional Labs: Not Applicable    Prior Living Situation:   Housing / Facility: 2 Story House (bedroom on 2nd floor)  Steps Into Home: 2  Steps In Home:  (FOS)  Lives with - Patient's Self Care Capacity: Adult Children  Equipment Owned: None    Prior Level of Function / Living Situation:   Physical Therapy: Prior Services: None  Housing / Facility: 2 Story House (bedroom on 2nd floor)  Steps Into Home: 2  Steps In Home:  (FOS)  Bathroom Set up: Bathtub / Shower Combination  Equipment Owned: None  Lives with - Patient's Self Care Capacity: Adult Children  Bed Mobility: Independent  Transfer Status: Independent  Ambulation: Independent  Distance Ambulation (Feet):  (community)  Assistive Devices Used: None  Stairs: Independent  Current Level of Function:   Gait Level Of Assist: Minimal Assist  Assistive Device: Front Wheel Walker  Distance (Feet): 100  Deviation: Ataxic (drags RLE, inconsistent amber )  # of Stairs Climbed: 0  Weight Bearing Status: no restrictions  Skilled Intervention: Verbal Cuing, Sequencing  Supine to Sit: Supervised  Sit to Supine: Supervised  Scooting: Supervised  Comments: up in chair pre and post session   Sit to Stand: Minimal Assist  Bed, Chair, Wheelchair Transfer: Minimal Assist  Toilet Transfers: Minimal Assist  Transfer Method: Stand Step  Skilled Intervention: Verbal Cuing  Sitting in Chair: up  in chair post session  Sitting Edge of Bed: 10 min  Standing: 10 " min  Occupational Therapy:   Self Feeding: Independent  Grooming / Hygiene: Independent  Bathing: Independent  Dressing: Independent  Toileting: Independent  Medication Management: Independent  Laundry: Independent  Kitchen Mobility: Independent  Finances: Independent  Home Management: Independent  Shopping: Independent  Prior Level Of Mobility: Independent Without Device in Community, Independent Without Device in Home  Driving / Transportation: Driving Independent  Prior Services: None  Housing / Facility: 2 Story House (bedroom on 2nd floor)  Occupation (Pre-Hospital Vocational): Employed Part Time  Current Level of Function:   Eating: Minimal Assist  Upper Body Dressing: Minimal Assist  Lower Body Dressing: Moderate Assist  Toileting: Minimal Assist  Skilled Intervention: Verbal Cuing  Speech Language Pathology:   Problem List: Dysphagia  Diet / Liquid Recommendation: Regular - Easy to Chew (7), Thin (0)  Rehabilitation Prognosis/Potential: Good  Estimated Length of Stay: 10-14 days    Nursing:      Continent    Scope/Intensity of Services Recommended:  Physical Therapy: 1 hr / day  5 days / week. Therapeutic Interventions Required: Maximize Endurance, Mobility, Strength and Safety  Occupational Therapy: 1 hr / day 5 days / week. Therapeutic Interventions Required: Maximize Self Care, ADLs, IADLs and Energy Conservation  Speech & Language Pathology: 1 hr / day 5 days / week. Therapeutic Interventions Required: Maximize Cognition, Swallowing and Safety  Rehabilitation Nursin/7. Therapeutic Interventions Required: Monitor Pain, Skin, Wound(s), Vital Signs, Intake and Output, Labs, Safety, Aspiration Risk and Family Training  Rehabilitation Physician: 3 - 5 days / week. Therapeutic Interventions Required: Medical Management    He requires 24-hour rehabilitation nursing to manage bowel and bladder function, skin care, nutrition and fluid intake, pain control, safety, medication management and patient/family  goals. In addition, rehabilitation nursing will reiterate and reinforce therapy skills and equipment use, including ADLs, as well as provide education to the patient and family. Alexey Caballero is willing to participate in and is able to tolerate the proposed plan of care.    Rehabilitation Goals and Plan (Expected frequency & duration of treatment in the IRF):   Return to the Community, Modified Independent Level of Care, Minimal Assist Level of Care and Family Able to Provide 24/7 Assistance  Anticipated Date of Rehabilitation Admission: 10/15/21  Patient/Family oriented IRF level of care/facility/plan: Yes  Patient/Family willing to participate in IRF care/facility/plan: Yes  Patient able to tolerate IRF level of care proposed: Yes  Patient has potential to benefit IRF level of care proposed: Yes  Comments: Not Applicable    Special Needs or Precautions - Medical Necessity:  Safety Concerns/Precautions:  Fall Risk / High Risk for Falls, Balance, Cognition and Bed / Chair Alarm  Pain Management  IV Site: Peripheral  Current Medications:    Current Facility-Administered Medications Ordered in Epic   Medication Dose Route Frequency Provider Last Rate Last Admin   • aspirin (ASA) chewable tab 81 mg  81 mg Oral DAILY Chelle Quiñones M.D.   81 mg at 10/15/21 0432   • atorvastatin (LIPITOR) tablet 80 mg  80 mg Oral Q EVENING Grover Rodriguez M.D.   80 mg at 10/14/21 1624   • therapeutic multivitamin-minerals (THERAGRAN-M) tablet 1 Tablet  1 Tablet Oral DAILY Grover Rodriguez M.D.   1 Tablet at 10/15/21 0432   • senna-docusate (PERICOLACE or SENOKOT S) 8.6-50 MG per tablet 2 Tablet  2 Tablet Oral BID Grover Rodriguez M.D.        And   • polyethylene glycol/lytes (MIRALAX) PACKET 1 Packet  1 Packet Oral QDAY PRN Grover Rodriguez M.D.        And   • magnesium hydroxide (MILK OF MAGNESIA) suspension 30 mL  30 mL Oral QDAY PRN Grover Rodriguez M.D.        And   • bisacodyl (DULCOLAX) suppository 10 mg  10 mg Rectal QDAY PRN Grover CHEN  JOHANNA Rodriguez       • acetaminophen (Tylenol) tablet 650 mg  650 mg Oral Q6HRS PRN Grover Rodriguez M.D.       • enoxaparin (LOVENOX) inj 40 mg  40 mg Subcutaneous DAILY Grover Rodriguez M.D.   40 mg at 10/15/21 0433     No current Epic-ordered outpatient medications on file.     Diet:   DIET ORDERS (From admission to next 24h)     Start     Ordered    10/12/21 1257  Diet Order Diet: Level 7 - Easy to Chew (no raw onions per pt preference); Liquid level: Level 0 - Thin  ALL MEALS        Question Answer Comment   Diet: Level 7 - Easy to Chew no raw onions per pt preference   Liquid level Level 0 - Thin        10/12/21 1256                Anticipated Discharge Destination / Patient/Family Goal:  Destination: Home with Assistance Support System: Family   Anticipated home health services: OT, PT, SLP, Nursing, Social Work and Aide  Previously used HH service/ provider: Not Applicable  Anticipated DME Needs: [employer]  Outpatient Services: OT, PT and SLP  Alternative resources to address additional identified needs:   None  Pre-Screen Completed: 10/15/2021 11:56 AM Dot Osorio

## 2021-10-15 NOTE — CARE PLAN
The patient is Stable - Low risk of patient condition declining or worsening    Shift Goals  Clinical Goals: maintain neuro status/mobility  Patient Goals: mobility/rest  Family Goals: JOHNATHAN    Progress made toward(s) clinical / shift goals:  Patient has maintained a stable neuro and hemodynamic status. Pt is able to mobilize self in bed is minimal/SBA with FFW walker and is able to perform on ADLS if given extra time.    Problem: Neuro Status  Goal: Neuro status will remain stable or improve  Outcome: Progressing     Problem: Hemodynamic Monitoring  Goal: Patient's hemodynamics, fluid balance and neurologic status will be stable or improve  Outcome: Progressing     Problem: Mobility - Stroke  Goal: Patient's capacity to carry out activities will improve  Outcome: Progressing     Problem: Self Care  Goal: Patient will have the ability to perform ADLs independently or with assistance (bathe, groom, dress, toilet and feed)  Outcome: Progressing       Patient is not progressing towards the following goals:

## 2021-10-16 ENCOUNTER — HOSPITAL ENCOUNTER (INPATIENT)
Facility: REHABILITATION | Age: 76
LOS: 5 days | DRG: 057 | End: 2021-10-21
Attending: PHYSICAL MEDICINE & REHABILITATION | Admitting: PHYSICAL MEDICINE & REHABILITATION
Payer: MEDICARE

## 2021-10-16 VITALS
BODY MASS INDEX: 32.14 KG/M2 | OXYGEN SATURATION: 94 % | WEIGHT: 212.08 LBS | RESPIRATION RATE: 16 BRPM | HEIGHT: 68 IN | HEART RATE: 60 BPM | SYSTOLIC BLOOD PRESSURE: 147 MMHG | DIASTOLIC BLOOD PRESSURE: 91 MMHG | TEMPERATURE: 97.5 F

## 2021-10-16 DIAGNOSIS — I63.9 ACUTE CVA (CEREBROVASCULAR ACCIDENT) (HCC): ICD-10-CM

## 2021-10-16 DIAGNOSIS — I10 ESSENTIAL HYPERTENSION: ICD-10-CM

## 2021-10-16 PROCEDURE — U0005 INFEC AGEN DETEC AMPLI PROBE: HCPCS

## 2021-10-16 PROCEDURE — 99223 1ST HOSP IP/OBS HIGH 75: CPT | Mod: AI | Performed by: PHYSICAL MEDICINE & REHABILITATION

## 2021-10-16 PROCEDURE — 700102 HCHG RX REV CODE 250 W/ 637 OVERRIDE(OP): Performed by: HOSPITALIST

## 2021-10-16 PROCEDURE — 99239 HOSP IP/OBS DSCHRG MGMT >30: CPT | Performed by: INTERNAL MEDICINE

## 2021-10-16 PROCEDURE — 770010 HCHG ROOM/CARE - REHAB SEMI PRIVAT*

## 2021-10-16 PROCEDURE — A9270 NON-COVERED ITEM OR SERVICE: HCPCS | Performed by: HOSPITALIST

## 2021-10-16 PROCEDURE — 94760 N-INVAS EAR/PLS OXIMETRY 1: CPT

## 2021-10-16 PROCEDURE — A9270 NON-COVERED ITEM OR SERVICE: HCPCS | Performed by: INTERNAL MEDICINE

## 2021-10-16 PROCEDURE — 700102 HCHG RX REV CODE 250 W/ 637 OVERRIDE(OP): Performed by: INTERNAL MEDICINE

## 2021-10-16 PROCEDURE — A9270 NON-COVERED ITEM OR SERVICE: HCPCS | Performed by: PHYSICAL MEDICINE & REHABILITATION

## 2021-10-16 PROCEDURE — U0003 INFECTIOUS AGENT DETECTION BY NUCLEIC ACID (DNA OR RNA); SEVERE ACUTE RESPIRATORY SYNDROME CORONAVIRUS 2 (SARS-COV-2) (CORONAVIRUS DISEASE [COVID-19]), AMPLIFIED PROBE TECHNIQUE, MAKING USE OF HIGH THROUGHPUT TECHNOLOGIES AS DESCRIBED BY CMS-2020-01-R: HCPCS

## 2021-10-16 PROCEDURE — 700111 HCHG RX REV CODE 636 W/ 250 OVERRIDE (IP): Performed by: HOSPITALIST

## 2021-10-16 PROCEDURE — 700102 HCHG RX REV CODE 250 W/ 637 OVERRIDE(OP): Performed by: PHYSICAL MEDICINE & REHABILITATION

## 2021-10-16 RX ORDER — TRAMADOL HYDROCHLORIDE 50 MG/1
50 TABLET ORAL EVERY 4 HOURS PRN
Status: DISCONTINUED | OUTPATIENT
Start: 2021-10-16 | End: 2021-10-21 | Stop reason: HOSPADM

## 2021-10-16 RX ORDER — M-VIT,TX,IRON,MINS/CALC/FOLIC 27MG-0.4MG
1 TABLET ORAL DAILY
Status: DISCONTINUED | OUTPATIENT
Start: 2021-10-17 | End: 2021-10-21 | Stop reason: HOSPADM

## 2021-10-16 RX ORDER — OMEPRAZOLE 20 MG/1
20 CAPSULE, DELAYED RELEASE ORAL DAILY
Status: DISCONTINUED | OUTPATIENT
Start: 2021-10-16 | End: 2021-10-20

## 2021-10-16 RX ORDER — ECHINACEA PURPUREA EXTRACT 125 MG
2 TABLET ORAL PRN
Status: DISCONTINUED | OUTPATIENT
Start: 2021-10-16 | End: 2021-10-21 | Stop reason: HOSPADM

## 2021-10-16 RX ORDER — ASPIRIN 81 MG/1
81 TABLET, CHEWABLE ORAL DAILY
Status: DISCONTINUED | OUTPATIENT
Start: 2021-10-17 | End: 2021-10-21 | Stop reason: HOSPADM

## 2021-10-16 RX ORDER — BISACODYL 10 MG
10 SUPPOSITORY, RECTAL RECTAL
Status: ON HOLD
Start: 2021-10-16 | End: 2021-10-21

## 2021-10-16 RX ORDER — ATORVASTATIN CALCIUM 80 MG/1
80 TABLET, FILM COATED ORAL EVERY EVENING
Status: ON HOLD
Start: 2021-10-16 | End: 2021-10-21 | Stop reason: SDUPTHER

## 2021-10-16 RX ORDER — OXYCODONE HYDROCHLORIDE 5 MG/1
2.5 TABLET ORAL
Status: DISCONTINUED | OUTPATIENT
Start: 2021-10-16 | End: 2021-10-21 | Stop reason: HOSPADM

## 2021-10-16 RX ORDER — ALUMINA, MAGNESIA, AND SIMETHICONE 2400; 2400; 240 MG/30ML; MG/30ML; MG/30ML
20 SUSPENSION ORAL
Status: DISCONTINUED | OUTPATIENT
Start: 2021-10-16 | End: 2021-10-21 | Stop reason: HOSPADM

## 2021-10-16 RX ORDER — ATORVASTATIN CALCIUM 40 MG/1
80 TABLET, FILM COATED ORAL EVERY EVENING
Status: DISCONTINUED | OUTPATIENT
Start: 2021-10-16 | End: 2021-10-21 | Stop reason: HOSPADM

## 2021-10-16 RX ORDER — OXYCODONE HYDROCHLORIDE 5 MG/1
5 TABLET ORAL
Status: DISCONTINUED | OUTPATIENT
Start: 2021-10-16 | End: 2021-10-21 | Stop reason: HOSPADM

## 2021-10-16 RX ORDER — POLYETHYLENE GLYCOL 3350 17 G/17G
17 POWDER, FOR SOLUTION ORAL
Status: ON HOLD
Start: 2021-10-16 | End: 2021-10-21

## 2021-10-16 RX ORDER — ASPIRIN 81 MG/1
81 TABLET, CHEWABLE ORAL DAILY
Status: ON HOLD
Start: 2021-10-17 | End: 2021-10-21 | Stop reason: SDUPTHER

## 2021-10-16 RX ORDER — ONDANSETRON 2 MG/ML
4 INJECTION INTRAMUSCULAR; INTRAVENOUS 4 TIMES DAILY PRN
Status: DISCONTINUED | OUTPATIENT
Start: 2021-10-16 | End: 2021-10-21 | Stop reason: HOSPADM

## 2021-10-16 RX ORDER — ONDANSETRON 4 MG/1
4 TABLET, ORALLY DISINTEGRATING ORAL 4 TIMES DAILY PRN
Status: DISCONTINUED | OUTPATIENT
Start: 2021-10-16 | End: 2021-10-21 | Stop reason: HOSPADM

## 2021-10-16 RX ORDER — ACETAMINOPHEN 325 MG/1
650 TABLET ORAL EVERY 4 HOURS PRN
Status: DISCONTINUED | OUTPATIENT
Start: 2021-10-16 | End: 2021-10-21 | Stop reason: HOSPADM

## 2021-10-16 RX ORDER — ACETAMINOPHEN 325 MG/1
650 TABLET ORAL EVERY 6 HOURS PRN
Status: SHIPPED
Start: 2021-10-16

## 2021-10-16 RX ORDER — LOSARTAN POTASSIUM 25 MG/1
25 TABLET ORAL DAILY
Status: ON HOLD
Start: 2021-10-17 | End: 2021-10-21 | Stop reason: SDUPTHER

## 2021-10-16 RX ORDER — TRAZODONE HYDROCHLORIDE 50 MG/1
50 TABLET ORAL
Status: DISCONTINUED | OUTPATIENT
Start: 2021-10-16 | End: 2021-10-21 | Stop reason: HOSPADM

## 2021-10-16 RX ORDER — AMOXICILLIN 250 MG
2 CAPSULE ORAL 2 TIMES DAILY
Status: DISCONTINUED | OUTPATIENT
Start: 2021-10-16 | End: 2021-10-21 | Stop reason: HOSPADM

## 2021-10-16 RX ORDER — POLYVINYL ALCOHOL 14 MG/ML
1 SOLUTION/ DROPS OPHTHALMIC PRN
Status: DISCONTINUED | OUTPATIENT
Start: 2021-10-16 | End: 2021-10-21 | Stop reason: HOSPADM

## 2021-10-16 RX ORDER — POLYETHYLENE GLYCOL 3350 17 G/17G
1 POWDER, FOR SOLUTION ORAL
Status: DISCONTINUED | OUTPATIENT
Start: 2021-10-16 | End: 2021-10-21 | Stop reason: HOSPADM

## 2021-10-16 RX ORDER — HYDRALAZINE HYDROCHLORIDE 25 MG/1
25 TABLET, FILM COATED ORAL EVERY 8 HOURS PRN
Status: DISCONTINUED | OUTPATIENT
Start: 2021-10-16 | End: 2021-10-21 | Stop reason: HOSPADM

## 2021-10-16 RX ORDER — AMOXICILLIN 250 MG
2 CAPSULE ORAL 2 TIMES DAILY
Status: ON HOLD
Start: 2021-10-16 | End: 2021-10-21

## 2021-10-16 RX ORDER — BISACODYL 10 MG
10 SUPPOSITORY, RECTAL RECTAL
Status: DISCONTINUED | OUTPATIENT
Start: 2021-10-16 | End: 2021-10-21 | Stop reason: HOSPADM

## 2021-10-16 RX ADMIN — Medication 1 TABLET: at 05:03

## 2021-10-16 RX ADMIN — ATORVASTATIN CALCIUM 80 MG: 40 TABLET, FILM COATED ORAL at 21:47

## 2021-10-16 RX ADMIN — LOSARTAN POTASSIUM 25 MG: 50 TABLET, FILM COATED ORAL at 05:03

## 2021-10-16 RX ADMIN — ASPIRIN 81 MG: 81 TABLET, CHEWABLE ORAL at 05:03

## 2021-10-16 RX ADMIN — ENOXAPARIN SODIUM 40 MG: 40 INJECTION SUBCUTANEOUS at 05:03

## 2021-10-16 ASSESSMENT — PAIN DESCRIPTION - PAIN TYPE
TYPE: ACUTE PAIN

## 2021-10-16 ASSESSMENT — FIBROSIS 4 INDEX: FIB4 SCORE: 1.7

## 2021-10-16 ASSESSMENT — LIFESTYLE VARIABLES: EVER_SMOKED: YES

## 2021-10-16 NOTE — DISCHARGE PLANNING
Confirmed with attending, transport, and nursing - patient to be picked up by [facility] today at 1200 coming to [facility]. TCC to remain available.

## 2021-10-16 NOTE — DISCHARGE SUMMARY
Discharge Summary    CHIEF COMPLAINT ON ADMISSION  Chief Complaint   Patient presents with   • Possible Stroke     PT MEREDITH THAYER last seen normal at 2230 and awoke with heavy right side and slurred speech.  PT with several GLF this AM       Reason for Admission  Poss Stroke     CODE STATUS  Full Code    HPI & HOSPITAL COURSE  Mr. Caballero has a history of coronary artery disease with prior myocardial infarction.  His last encounter in her system was related to a left hand laceration, he was treated in the emergency room.     History taken from the patient and his son who is at the bedside.  Patient says that he has been in his normal state of health until he went to bed at approximately 1030 on the night prior to admission.  When he awakened in the morning he felt as though his right side was heavy he endorsed difficulty with coordination, some difficulty walking, and when he tried to speak he felt it was hard to find the words.  Patient had significant daily difficulty walking to the bathroom.  The patient's son evaluated and was concerned, he called EMS and patient as brought to the ER for evaluation.  Since arrival they both felt the patient's speech was improved but not back to normal.    MRI brain revealed a small acute infarction as noted below. The patient was treated with medical management and permissive hypertension. After that period, the patient remained hypertensive and was started on oral antihypertensive medications. Physiatry was consulted and determined that the patient was a good candidate for inpatient rehab.      Therefore, he is discharged in good and stable condition to an inpatient rehabilitation hospital.    The patient met 2-midnight criteria for an inpatient stay at the time of discharge.      FOLLOW UP ITEMS POST DISCHARGE  Stroke bridge clinic  Vascular surgery clinic regarding carotid stenosis    DISCHARGE DIAGNOSES  Principal Problem:    Ischemic stroke (HCC) POA: Yes  Active Problems:     Coronary artery disease involving native coronary artery of native heart without angina pectoris POA: Yes    Essential hypertension POA: Yes    Obesity (BMI 30-39.9) POA: Yes    Pure hypercholesterolemia POA: Yes    Aphasia POA: Yes    Bilateral carotid artery stenosis POA: Yes  Resolved Problems:    * No resolved hospital problems. *      FOLLOW UP  No future appointments.  ALBERT AbdiRRolandN.  [address]  [address]  [facility] [address] [phone]      Please call your primary care provider to schedule a hospital follow up. Thank you.       MEDICATIONS ON DISCHARGE     Medication List      START taking these medications      Instructions   acetaminophen 325 MG Tabs  Commonly known as: Tylenol   Take 2 Tablets by mouth every 6 hours as needed.  Dose: 650 mg     atorvastatin 80 MG tablet  Commonly known as: LIPITOR   Take 1 Tablet by mouth every evening.  Dose: 80 mg     bisacodyl 10 MG Supp  Commonly known as: DULCOLAX   Insert 1 Suppository into the rectum 1 time a day as needed (if magnesium hydroxide ineffective after 24 hours).  Dose: 10 mg     enoxaparin 40 MG/0.4ML Soln inj  Start taking on: October 17, 2021  Commonly known as: LOVENOX   Inject 40 mg under the skin every day.  Dose: 40 mg     losartan 25 MG Tabs  Start taking on: October 17, 2021  Commonly known as: COZAAR   Take 1 Tablet by mouth every day.  Dose: 25 mg     magnesium hydroxide 400 MG/5ML Susp  Commonly known as: MILK OF MAGNESIA   Take 30 mL by mouth 1 time a day as needed (if polyethylene glycol ineffective after 24 hours).  Dose: 30 mL     polyethylene glycol/lytes 17 g Pack  Commonly known as: MIRALAX   Take 1 Packet by mouth 1 time a day as needed (if sennosides and docusate ineffective after 24 hours).  Dose: 17 g     senna-docusate 8.6-50 MG Tabs  Commonly known as: PERICOLACE or SENOKOT S   Take 2 Tablets by mouth 2 times a day.  Dose: 2 Tablet        CHANGE how you take these medications      Instructions   aspirin 81  MG Chew chewable tablet  Start taking on: October 17, 2021  What changed: when to take this  Commonly known as: ASA   Chew 1 Tablet every day.  Dose: 81 mg        CONTINUE taking these medications      Instructions   FISH OIL PO   Take 1 Capsule by mouth every day.  Dose: 1 Capsule     GARLIC PO   Take 1 Capsule by mouth every day.  Dose: 1 Capsule     therapeutic multivitamin-minerals Tabs   Take 1 Tab by mouth every day.  Dose: 1 Tablet     vitamin D 2000 UNIT Tabs   Take 2,000 Units by mouth every 7 days.  Dose: 2,000 Units            Allergies  Allergies   Allergen Reactions   • Tape Rash     Pt requests only paper tape as hypoallergenic tape gives him a severe rash.    • Statins [Hmg-Coa-R Inhibitors] Myalgia     Patient reports severe myalgia to multiple statins in the past.       DIET  Orders Placed This Encounter   Procedures   • Diet Order Diet: Level 7 - Easy to Chew (no raw onions per pt preference); Liquid level: Level 0 - Thin     Standing Status:   Standing     Number of Occurrences:   1     Order Specific Question:   Diet:     Answer:   Level 7 - Easy to Chew [22]     Comments:   no raw onions per pt preference     Order Specific Question:   Liquid level     Answer:   Level 0 - Thin       ACTIVITY  As tolerated and directed by rehab.  Weight bearing as tolerated    LINES, DRAINS, AND WOUNDS  This is an automated list. Peripheral IVs will be removed prior to discharge.  Peripheral IV 10/12/21 18 G Right Antecubital (Active)   Site Assessment Clean;Dry;Intact 10/15/21 2000   Dressing Type Transparent 10/15/21 2000   Line Status Scrubbed the hub prior to access;Saline locked;Flushed;No blood return 10/15/21 2000   Dressing Status Clean;Dry;Intact 10/15/21 2000   Dressing Intervention N/A 10/15/21 2000   Date Primary Tubing Changed 10/12/21 10/12/21 2100   Infiltration Grading ([facility]) 0 10/15/21 2000   Phlebitis Scale ([facility] Only) 0 10/15/21 2000          Peripheral IV 10/12/21 18 G Right  Antecubital (Active)   Site Assessment Clean;Dry;Intact 10/15/21 2000   Dressing Type Transparent 10/15/21 2000   Line Status Scrubbed the hub prior to access;Saline locked;Flushed;No blood return 10/15/21 2000   Dressing Status Clean;Dry;Intact 10/15/21 2000   Dressing Intervention N/A 10/15/21 2000   Date Primary Tubing Changed 10/12/21 10/12/21 2100   Infiltration Grading ([facility], [facility]) 0 10/15/21 2000   Phlebitis Scale ([facility] Only) 0 10/15/21 2000               MENTAL STATUS ON TRANSFER             CONSULTATIONS  Neurology  Physiatry    PROCEDURES  none    LABORATORY  Lab Results   Component Value Date    SODIUM 136 10/12/2021    POTASSIUM 3.6 10/12/2021    CHLORIDE 102 10/12/2021    CO2 23 10/12/2021    GLUCOSE 109 (H) 10/12/2021    BUN 15 10/12/2021    CREATININE 0.99 10/12/2021        Lab Results   Component Value Date    WBC 9.0 10/12/2021    HEMOGLOBIN 14.9 10/12/2021    HEMATOCRIT 44.2 10/12/2021    PLATELETCT 241 10/12/2021      MR-BRAIN-W/O   Final Result         Small foci of acute infarction in the left frontal subcortical region, left posterior limb internal capsule/corona radiata and left occipital white matter.      Remote left medial parietal, left coronal radiata and left basal ganglia lacunar infarcts.      EC-ECHOCARDIOGRAM COMPLETE W/O CONT   Final Result      DX-CHEST-PORTABLE (1 VIEW)   Final Result      Mild cardiomegaly      CT-CTA NECK WITH & W/O-POST PROCESSING   Final Result      1.  Left carotid arterial bifurcation atherosclerotic plaque results in moderate (50-70% diameter) stenosis.      2.  Right carotid arterial bifurcation atherosclerotic plaque results in mild (less than 50% diameter) stenosis.      3.  Patent vertebral arteries.      CT-CTA HEAD WITH & W/O-POST PROCESS   Final Result      No evidence of intracranial vascular occlusion or aneurysm.      CT-CEREBRAL PERFUSION ANALYSIS   Final Result      1.  Cerebral blood flow less than 30% likely representing completed infarct  = 0 mL.      2.  T Max more than 6 seconds likely representing combination of completed infarct and ischemia = 0 mL.      3.  Mismatched volume likely representing ischemic brain/penumbra = None      4.  Please note that the cerebral perfusion was performed on the limited brain tissue around the basal ganglia region. Infarct/ischemia outside the CT perfusion sections can be missed in this study.      CT-HEAD W/O   Final Result      No evidence of acute intracranial process.         Total time of the discharge process exceeds 45 minutes.

## 2021-10-16 NOTE — PROGRESS NOTES
Called [facility] rehab, gave report to GERARD Alvarado. Answered all questions. Pt showered and dressed by CNA. Personal belongings collected and packed. Removed PIV. Answered all questions and reviewed AVS with patient and Son.    1218: Pt transported with Simon from [facility].

## 2021-10-16 NOTE — CARE PLAN
The patient is Stable - Low risk of patient condition declining or worsening    Shift Goals  Clinical Goals: Monitor neurological status  Patient Goals: Rest  Family Goals: JOHNATHAN    Progress made toward(s) clinical / shift goals: Assumed care of patient at 1900. Patient is A&Ox4 and Q4hr neuro checks are being completed. NIHSS = 2. Bed is low and locked, bed alarm is on, call light is within reach, hourly rounding continues.     Problem: Optimal Care of the Stroke Patient  Goal: Optimal acute care for the stroke patient  Outcome: Progressing     Problem: Knowledge Deficit - Stroke Education  Goal: Patient's knowledge of stroke and risk factors will improve  Outcome: Progressing       Patient is not progressing towards the following goals: N/A.

## 2021-10-16 NOTE — FLOWSHEET NOTE
10/16/21 1555   Events/Summary/Plan   Events/Summary/Plan RT assessment   Vital Signs   Pulse 63   Respiration 18   Pulse Oximetry 96 %   $ Pulse Oximetry (Spot Check) Yes   Respiratory Assessment   Level of Consciousness Alert   Chest Exam   Work Of Breathing / Effort Within Normal Limits   Oxygen   O2 Delivery Device None - Room Air   Smoking History   Have you ever smoked Yes   Have you smoked in the last 12 months No   Confirm Quit Date 10/16/75

## 2021-10-16 NOTE — DISCHARGE PLANNING
Anticipated Discharge Disposition:   [facility]    Action:    Pt accepted to [facility] by Dr. Travis and bed available today per [facility]. Transport via [facility] today at 1200 scheduled.  Pt agreeable.  Verbal for Cobra obtained.    Cobra signed by Dr. Quiñones.    Cobra form given to bedside RNDorys.    Letter securely emailed to patient's manager, Shanda Randhawa to verify patient's hospitalization yesterday evening as requested.  [email or web address]    Barriers to Discharge:    None    Plan:    DC    Care Transition Team Assessment    Information Source  Orientation Level: Oriented X4  Information Given By: Patient, Relative  Informant's Name:  (Guillaume Caballero)  Who is responsible for making decisions for patient? : Patient    Readmission Evaluation  Is this a readmission?: No    Elopement Risk  Legal Hold: No  Ambulatory or Self Mobile in Wheelchair: No-Not an Elopement Risk  Elopement Risk: Not at Risk for Elopement    Interdisciplinary Discharge Planning  Lives with - Patient's Self Care Capacity: Adult Children  Patient or legal guardian wants to designate a caregiver: No  Housing / Facility: 2 Story House (bedroom on 2nd floor)  Prior Services: None    Discharge Preparedness  What is your plan after discharge?: Other (comment)  What are your discharge supports?: Child  Prior Functional Level: Ambulatory, Drives Self, Independent with Activities of Daily Living, Independent with Medication Management  Difficulity with ADLs: Bathing, Dressing, Eating, Walking  Difficulity with IADLs: Cooking, Driving, Laundry, Managing medication, Shopping    Functional Assesment  Prior Functional Level: Ambulatory, Drives Self, Independent with Activities of Daily Living, Independent with Medication Management    Finances  Financial Barriers to Discharge: No  Prescription Coverage: Yes    Vision / Hearing Impairment  Vision Impairment : Yes  Right Eye Vision: Wears Glasses, Impaired  Left Eye Vision: Wears  Glasses, Impaired  Hearing Impairment : No         Advance Directive  Advance Directive?: None    Domestic Abuse  Have you ever been the victim of abuse or violence?: No  Physical Abuse or Sexual Abuse: No  Verbal Abuse or Emotional Abuse: No  Possible Abuse/Neglect Reported to:: Not Applicable         Discharge Risks or Barriers  Discharge risks or barriers?: No    Anticipated Discharge Information  Discharge Disposition: D/T to IP rehab facility w/planned hosp IP readmit (52)  Discharge Contact Phone Number: [phone]

## 2021-10-16 NOTE — PROGRESS NOTES
Monitor Summary: SB-SR 59-76 NH 0.21 QRS 0.09 QT 0.42, with frequent PVCs occasional to frequent trigeminy per strip from monitor room.

## 2021-10-16 NOTE — FLOWSHEET NOTE
10/16/21 1554   Patient History   Pulmonary Diagnosis none   Procedures Relevant to Respiratory Status none   Home O2 No   Nocturnal CPAP No   Home Treatments/Frequency No   Sleep Apnea Screening   Have you had a sleep study? Yes   Have you been diagnosed with sleep apnea? Yes  (resolved with weight loss)

## 2021-10-16 NOTE — DISCHARGE INSTRUCTIONS
Discharge Instructions    Discharged to other by [facility] with escort. Discharged via wheelchair, hospital escort: Yes.  Special equipment needed: Not Applicable    Be sure to schedule a follow-up appointment with your primary care doctor or any specialists as instructed.     Discharge Plan:   Diet Plan: Discussed  Activity Level: Discussed  Confirmed Follow up Appointment: Patient to Call and Schedule Appointment  Confirmed Symptoms Management: Discussed  Medication Reconciliation Updated: Yes  Influenza Vaccine Indication: Patient Refuses    I understand that a diet low in cholesterol, fat, and sodium is recommended for good health. Unless I have been given specific instructions below for another diet, I accept this instruction as my diet prescription.   Other diet: Regular      Special Instructions:     Stroke/CVA/TIA/Hemorrhagic Ischemia Discharge Instructions  You have had a stroke. Your risk factors have been identified as follows:  High blood pressure  Artery Disease / Peripheral Vascular Disease   Carotid Stenosis   High Cholesterol and lipids  Overweight  It is important that you reduce your risk factors to avoid another stroke in the future. Here are some general guidelines to follow:  · Eat healthy - avoid food high in fat.  · Get regular exercise.  · Maintain a healthy weight.  · Avoid smoking.  · Avoid alcohol and illegal drug use.  · Take your medications as directed.  For more information regarding risk factors, refer to pages 17-19 in your Stroke Patient Education Guide. Stroke Education Guide was given to patient.    Warning signs of a stroke include (which can also be found on page 3 of your Stroke Patient Education Guide):  · Sudden numbness of weakness of the face, arm or leg (especially on one side of the body).  · Sudden confusion, trouble speaking or understanding.  · Sudden trouble seeing in one or both eyes.  · Sudden trouble walking, dizziness, loss of balance or coordination.  · Sudden  severe headache with no known cause.  It is very important to get treatment quickly when a stroke occurs. If you experience any of the above warning signs, call [phone] immediately.     Some patients who have had a stroke will be going home on a blood thinner medication called Warfarin (Coumadin).  This medication requires very close monitoring and follow up.  This follow up can be provided by either your Primary Care Physician or by [facility] Outpatient Anticoagulation Service.  The Outpatient Anticoagulation Service is located at the [facility] at [facility] ([facility]).  If you do not know when your follow up appointment is scheduled, call [phone] to verify your appointment time.      · Is patient discharged on Warfarin / Coumadin?   No     Depression / Suicide Risk    As you are discharged from this [facility], it is important to learn how to keep safe from harming yourself.    Recognize the warning signs:  · Abrupt changes in personality, positive or negative- including increase in energy   · Giving away possessions  · Change in eating patterns- significant weight changes-  positive or negative  · Change in sleeping patterns- unable to sleep or sleeping all the time   · Unwillingness or inability to communicate  · Depression  · Unusual sadness, discouragement and loneliness  · Talk of wanting to die  · Neglect of personal appearance   · Rebelliousness- reckless behavior  · Withdrawal from people/activities they love  · Confusion- inability to concentrate     If you or a loved one observes any of these behaviors or has concerns about self-harm, here's what you can do:  · Talk about it- your feelings and reasons for harming yourself  · Remove any means that you might use to hurt yourself (examples: pills, rope, extension cords, firearm)  · Get professional help from the community (Mental Health, Substance Abuse, psychological counseling)  · Do  not be alone:Call your Safe Contact- someone whom you trust who will be there for you.  · Call your local CRISIS HOTLINE 395-1461 or 500-704-0263  · Call your local Children's Mobile Crisis Response Team [place] (666) 799-2311 or www.[employer]  · Call the toll free National Suicide Prevention Hotlines   · National Suicide Prevention Lifeline 645-097-ZFWI (4264)  · National [employer] Line Network 800-SUICIDE (624-6306)

## 2021-10-17 LAB
25(OH)D3 SERPL-MCNC: 21 NG/ML (ref 30–100)
ALBUMIN SERPL BCP-MCNC: 4.2 G/DL (ref 3.2–4.9)
ALBUMIN/GLOB SERPL: 1.8 G/DL
ALP SERPL-CCNC: 59 U/L (ref 30–99)
ALT SERPL-CCNC: 36 U/L (ref 2–50)
ANION GAP SERPL CALC-SCNC: 8 MMOL/L (ref 7–16)
AST SERPL-CCNC: 31 U/L (ref 12–45)
BASOPHILS # BLD AUTO: 0.7 % (ref 0–1.8)
BASOPHILS # BLD: 0.07 K/UL (ref 0–0.12)
BILIRUB SERPL-MCNC: 0.7 MG/DL (ref 0.1–1.5)
BUN SERPL-MCNC: 15 MG/DL (ref 8–22)
CALCIUM SERPL-MCNC: 9.1 MG/DL (ref 8.5–10.5)
CHLORIDE SERPL-SCNC: 106 MMOL/L (ref 96–112)
CO2 SERPL-SCNC: 25 MMOL/L (ref 20–33)
CREAT SERPL-MCNC: 0.88 MG/DL (ref 0.5–1.4)
EOSINOPHIL # BLD AUTO: 0.44 K/UL (ref 0–0.51)
EOSINOPHIL NFR BLD: 4.6 % (ref 0–6.9)
ERYTHROCYTE [DISTWIDTH] IN BLOOD BY AUTOMATED COUNT: 45.5 FL (ref 35.9–50)
EST. AVERAGE GLUCOSE BLD GHB EST-MCNC: 111 MG/DL
GLOBULIN SER CALC-MCNC: 2.3 G/DL (ref 1.9–3.5)
GLUCOSE SERPL-MCNC: 100 MG/DL (ref 65–99)
HBA1C MFR BLD: 5.5 % (ref 4–5.6)
HCT VFR BLD AUTO: 45.8 % (ref 42–52)
HGB BLD-MCNC: 15.4 G/DL (ref 14–18)
IMM GRANULOCYTES # BLD AUTO: 0.04 K/UL (ref 0–0.11)
IMM GRANULOCYTES NFR BLD AUTO: 0.4 % (ref 0–0.9)
LYMPHOCYTES # BLD AUTO: 2.8 K/UL (ref 1–4.8)
LYMPHOCYTES NFR BLD: 29 % (ref 22–41)
MCH RBC QN AUTO: 32.6 PG (ref 27–33)
MCHC RBC AUTO-ENTMCNC: 33.6 G/DL (ref 33.7–35.3)
MCV RBC AUTO: 97 FL (ref 81.4–97.8)
MONOCYTES # BLD AUTO: 0.73 K/UL (ref 0–0.85)
MONOCYTES NFR BLD AUTO: 7.6 % (ref 0–13.4)
NEUTROPHILS # BLD AUTO: 5.57 K/UL (ref 1.82–7.42)
NEUTROPHILS NFR BLD: 57.7 % (ref 44–72)
NRBC # BLD AUTO: 0 K/UL
NRBC BLD-RTO: 0 /100 WBC
PLATELET # BLD AUTO: 229 K/UL (ref 164–446)
PMV BLD AUTO: 12.7 FL (ref 9–12.9)
POTASSIUM SERPL-SCNC: 4 MMOL/L (ref 3.6–5.5)
PROT SERPL-MCNC: 6.5 G/DL (ref 6–8.2)
RBC # BLD AUTO: 4.72 M/UL (ref 4.7–6.1)
SARS-COV-2 RNA RESP QL NAA+PROBE: NOTDETECTED
SODIUM SERPL-SCNC: 139 MMOL/L (ref 135–145)
SPECIMEN SOURCE: NORMAL
T4 FREE SERPL-MCNC: 0.95 NG/DL (ref 0.93–1.7)
TSH SERPL DL<=0.005 MIU/L-ACNC: 12.4 UIU/ML (ref 0.38–5.33)
WBC # BLD AUTO: 9.7 K/UL (ref 4.8–10.8)

## 2021-10-17 PROCEDURE — 84439 ASSAY OF FREE THYROXINE: CPT

## 2021-10-17 PROCEDURE — A9270 NON-COVERED ITEM OR SERVICE: HCPCS | Performed by: PHYSICAL MEDICINE & REHABILITATION

## 2021-10-17 PROCEDURE — 82306 VITAMIN D 25 HYDROXY: CPT

## 2021-10-17 PROCEDURE — 92610 EVALUATE SWALLOWING FUNCTION: CPT

## 2021-10-17 PROCEDURE — 97535 SELF CARE MNGMENT TRAINING: CPT

## 2021-10-17 PROCEDURE — 80053 COMPREHEN METABOLIC PANEL: CPT

## 2021-10-17 PROCEDURE — 700111 HCHG RX REV CODE 636 W/ 250 OVERRIDE (IP): Performed by: PHYSICAL MEDICINE & REHABILITATION

## 2021-10-17 PROCEDURE — 700102 HCHG RX REV CODE 250 W/ 637 OVERRIDE(OP): Performed by: PHYSICAL MEDICINE & REHABILITATION

## 2021-10-17 PROCEDURE — 97165 OT EVAL LOW COMPLEX 30 MIN: CPT

## 2021-10-17 PROCEDURE — 97162 PT EVAL MOD COMPLEX 30 MIN: CPT

## 2021-10-17 PROCEDURE — 84443 ASSAY THYROID STIM HORMONE: CPT

## 2021-10-17 PROCEDURE — 85025 COMPLETE CBC W/AUTO DIFF WBC: CPT

## 2021-10-17 PROCEDURE — 83036 HEMOGLOBIN GLYCOSYLATED A1C: CPT

## 2021-10-17 PROCEDURE — 770010 HCHG ROOM/CARE - REHAB SEMI PRIVAT*

## 2021-10-17 PROCEDURE — 92523 SPEECH SOUND LANG COMPREHEN: CPT

## 2021-10-17 PROCEDURE — 36415 COLL VENOUS BLD VENIPUNCTURE: CPT

## 2021-10-17 RX ADMIN — MULTIPLE VITAMINS W/ MINERALS TAB 1 TABLET: TAB at 09:22

## 2021-10-17 RX ADMIN — ENOXAPARIN SODIUM 40 MG: 40 INJECTION SUBCUTANEOUS at 09:23

## 2021-10-17 RX ADMIN — ASPIRIN 81 MG CHEWABLE TABLET 81 MG: 81 TABLET CHEWABLE at 09:22

## 2021-10-17 RX ADMIN — ATORVASTATIN CALCIUM 80 MG: 40 TABLET, FILM COATED ORAL at 20:18

## 2021-10-17 ASSESSMENT — BALANCE ASSESSMENTS
STANDING UNSUPPORTED WITH EYES CLOSED: 3
REACHING FORWARD WITH OUTSTRETCHED ARM WHILE STANDING: 3
TURN 360 DEGREES: 1
STANDING ON ONE LEG: 1
PLACE ALTERNATE FOOT ON STEP OR STOOL WHILE STANDING UNSUPPORTED: 0
STANDING UNSUPPORTED: 2
LOOK OVER LEFT AND RIGHT SHOULDERS WHILE STANDING: 2
PICK UP OBJECT FROM THE FLOOR FROM A STANDING POSITION: 1
SITTING UNSUPPORTED: 4
TRANSFERS: 1
STANDING TO SITTING: 3
SITTING TO STANDING: 2
STANDING UNSUPPORTED WITH FEET TOGETHER: 2
LONG VERSION TOTAL SCORE (MAX 56): 27
STANDING UNSUPPORTED ONE FOOT IN FRONT: 2
LONG VERSION TOTAL SCORE (MAX 56): 27

## 2021-10-17 ASSESSMENT — LIFESTYLE VARIABLES
TOTAL SCORE: 0
EVER HAD A DRINK FIRST THING IN THE MORNING TO STEADY YOUR NERVES TO GET RID OF A HANGOVER: NO
TOTAL SCORE: 0
TOTAL SCORE: 0
HAVE PEOPLE ANNOYED YOU BY CRITICIZING YOUR DRINKING: NO
CONSUMPTION TOTAL: NEGATIVE
EVER FELT BAD OR GUILTY ABOUT YOUR DRINKING: NO
ALCOHOL_USE: YES
ON A TYPICAL DAY WHEN YOU DRINK ALCOHOL HOW MANY DRINKS DO YOU HAVE: 2
AVERAGE NUMBER OF DAYS PER WEEK YOU HAVE A DRINK CONTAINING ALCOHOL: 3
HOW MANY TIMES IN THE PAST YEAR HAVE YOU HAD 5 OR MORE DRINKS IN A DAY: 0
HAVE YOU EVER FELT YOU SHOULD CUT DOWN ON YOUR DRINKING: NO

## 2021-10-17 ASSESSMENT — BRIEF INTERVIEW FOR MENTAL STATUS (BIMS)
ASKED TO RECALL SOCK: YES, NO CUE REQUIRED
INITIAL REPETITION OF BED BLUE SOCK - FIRST ATTEMPT: 3
ASKED TO RECALL BED: YES, NO CUE REQUIRED
ASKED TO RECALL BLUE: YES, NO CUE REQUIRED
WHAT DAY OF THE WEEK IS IT: CORRECT
BIMS SUMMARY SCORE: 15
WHAT YEAR IS IT: CORRECT
WHAT MONTH IS IT: ACCURATE WITHIN 5 DAYS

## 2021-10-17 ASSESSMENT — GAIT ASSESSMENTS
DISTANCE (FEET): 150
GAIT LEVEL OF ASSIST: CONTACT GUARD ASSIST
ASSISTIVE DEVICE: FRONT WHEEL WALKER
DEVIATION: DECREASED BASE OF SUPPORT;DECREASED HEEL STRIKE;DECREASED TOE OFF;SHUFFLED GAIT;BRADYKINETIC

## 2021-10-17 ASSESSMENT — PATIENT HEALTH QUESTIONNAIRE - PHQ9
1. LITTLE INTEREST OR PLEASURE IN DOING THINGS: NOT AT ALL
SUM OF ALL RESPONSES TO PHQ9 QUESTIONS 1 AND 2: 0
1. LITTLE INTEREST OR PLEASURE IN DOING THINGS: NOT AT ALL
SUM OF ALL RESPONSES TO PHQ9 QUESTIONS 1 AND 2: 0
2. FEELING DOWN, DEPRESSED, IRRITABLE, OR HOPELESS: NOT AT ALL
2. FEELING DOWN, DEPRESSED, IRRITABLE, OR HOPELESS: NOT AT ALL

## 2021-10-17 ASSESSMENT — ACTIVITIES OF DAILY LIVING (ADL)
BED_CHAIR_WHEELCHAIR_TRANSFER_DESCRIPTION: ADAPTIVE EQUIPMENT
TOILETING: INDEPENDENT

## 2021-10-17 ASSESSMENT — FIBROSIS 4 INDEX: FIB4 SCORE: 1.78

## 2021-10-17 NOTE — THERAPY
Occupational Therapy   Initial Evaluation     Patient Name: Alexey Caballero  Age:  76 y.o., Sex:  male  Medical Record #: [id number]  Today's Date: 10/17/2021     Subjective    Pt supine in bed upon arrival, pleasant and cooperative, agreeable to therapy and shower. Requested pen/paper to practice handwriting in room which was provided at end of session.     Objective       10/17/21 0701   Prior Living Situation   Prior Services Home-Independent   Housing / Facility 2 Story House   Steps Into Home 2   Bathroom Set up Bathtub / Shower Combination;Shower Glass Doors   Equipment Owned None   Lives with - Patient's Self Care Capacity Adult Children   Comments Pt reports living in [place] with son. Son works from home and able to assist at d/c   Prior Level of ADL Function   Self Feeding Independent   Grooming / Hygiene Independent   Bathing Independent   Dressing Independent   Toileting Independent   Prior Level of IADL Function   Medication Management Independent   Laundry Independent   Kitchen Mobility Independent   Finances Independent   Home Management Independent   Shopping Independent   Prior Level Of Mobility Independent Without Device in Community;Independent Without Device in Home   Driving / Transportation Driving Independent   Occupation (Pre-Hospital Vocational) Employed Part Time  (at [employer] store)   Prior Functioning: Everyday Activities   Self Care Independent   Indoor Mobility (Ambulation) Independent   Stairs Independent   Functional Cognition Independent   Prior Device Use None of the given options   Pain 0 - 10 Group   Therapist Pain Assessment 0   Cognition    Cognition / Consciousness WDL   Level of Consciousness Alert   ABS (Agitated Behavior Scale)   Agitated Behavior Scale Performed No   Vision Screen   Vision   (no vision deficits, but facial movement deficits)   Passive ROM Upper Body   Passive ROM Upper Body WDL   Active ROM Upper Body   Active ROM Upper Body  WDL   Dominant Hand Right    Strength Upper Body   Upper Body Strength  X   Comments RUE 4/5, LUE 5/5   Sensation Upper Body   Upper Extremity Sensation  X   Comments pt reports return of sensation, aching thumb pain   Upper Body Muscle Tone   Upper Body Muscle Tone  WDL   Balance Assessment   Sitting Balance (Static) Good   Sitting Balance (Dynamic) Good   Standing Balance (Static) Fair +   Standing Balance (Dynamic) Fair   Weight Shift Sitting Good   Weight Shift Standing Fair   Comments without UE support   Bed Mobility    Supine to Sit Supervised   Sit to Supine Supervised   Sit to Stand Supervised   Scooting Supervised   Coordination Upper Body   Coordination X   Fine Motor Coordination pt able to tie shoes, but reports difficulty with handwriting   Gross Motor Coordination mild dysmetria   Eating   Assistance Needed Supervision   Physical Assistance Level No physical assistance   CARE Score - Eating 4   Eating Discharge Goal   Discharge Goal 6   Oral Hygiene   Assistance Needed Supervision   Physical Assistance Level No physical assistance   CARE Score - Oral Hygiene 4   Oral Hygiene Discharge Goal   Discharge Goal 6   Shower/Bathe Self   Assistance Needed Supervision   Physical Assistance Level No physical assistance   CARE Score - Shower/Bathe Self 4   Shower/Bathe Self Discharge Goal   Discharge Goal 6   Upper Body Dressing   Assistance Needed Supervision   Physical Assistance Level No physical assistance   CARE Score - Upper Body Dressing 4   Upper Body Dressing Discharge Goal   Discharge Goal 6   Lower Body Dressing   Assistance Needed Supervision   Physical Assistance Level No physical assistance   CARE Score - Lower Body Dressing 4   Lower Body Dressing Discharge Goal   Discharge Goal 6   Putting On/Taking Off Footwear   Assistance Needed Supervision   Physical Assistance Level No physical assistance   CARE Score - Putting On/Taking Off Footwear 4   Putting On/Taking Off Footwear Discharge Goal   Discharge Goal 6   Toileting  Hygiene   Assistance Needed Supervision   Physical Assistance Level No physical assistance   CARE Score - Toileting Hygiene 4   Toileting Hygiene Discharge Goal   Discharge Goal 6   Toilet Transfer   Assistance Needed Supervision   Physical Assistance Level No physical assistance   CARE Score - Toilet Transfer 4   Toilet Transfer Discharge Goal   Discharge Goal 6   Hearing, Speech, and Vision   Expression of Ideas and Wants Without difficulty   Understanding Verbal and Non-Verbal Content Understands   Functional Level of Assist   Eating Supervision   Grooming Supervision;Standing   Bathing Supervision  (standing shower with use of GB's)   Upper Body Dressing Supervision   Lower Body Dressing Supervision   Toilet Transfers Stand by Assist  (with FWW)   Tub / Shower Transfers Stand by Assist  (with FWW)   Problem List   Problem List Decreased Upper Extremity Strength Right;Decreased Upper Extremity AROM Right;Decreased Homemaking Skills;Decreased Active Daily Living Skills;Decreased Functional Mobility;Decreased Activity Tolerance;Safety Awareness Deficits / Cognition;Impaired Coordination Right Upper Extremity;Impaired Sensation Right Upper Extremity;Impaired Postural Control / Balance   Precautions   Precautions Fall Risk   Comments R omayra   Current Discharge Plan   Current Discharge Plan Return to Prior Living Situation   Benefit    Therapy Benefit Patient Would Benefit from Inpatient Rehab Occupational Therapy to Maximize [place] with ADLs, IADLs and Functional Mobility.   Interdisciplinary Plan of Care Collaboration   IDT Collaboration with  Physical Therapist;Nursing   Patient Position at End of Therapy Seated;Call Light within Reach;Tray Table within Reach   Collaboration Comments re: CLOF, position at end of session   Equipment Needs   Assistive Device / DME Grab Bars By Toilet;Grab Bars In Shower / Tub   Adaptive Equipment None   Strengths & Barriers   Strengths Able to follow instructions;Alert and  oriented;Independent prior level of function;Making steady progress towards goals;Manages pain appropriately;Motivated for self care and independence;Pleasant and cooperative;Supportive family;Willingly participates in therapeutic activities   Barriers Decreased endurance;Generalized weakness;Hemiparesis;Impaired balance;Impaired activity tolerance   OT Total Time Spent   OT Individual Total Time Spent (Mins) 60   OT Charge Group   Charges Yes   OT Self Care / ADL 1   OT Evaluation OT Evaluation Low       Assessment  Patient is 76 y.o. male with a diagnosis of Left posterior limb internal capsule CVA.  Additional factors influencing patient status / progress (ie: cognitive factors, co-morbidities, social support, etc): Pt presents with impaired standing balance, R hemiparesis, and decrease strength/activity tolerance/coordination that impacts full participation with ADL's and functional mobility.      Plan  Recommend Occupational Therapy  minutes per day 5-7 days per week for 7 days for the following treatments:  OT E Stim Attended, OT Group Therapy, OT Self Care/ADL, OT Community Reintegration, OT Manual Ther Technique, OT Neuro Re-Ed/Balance, OT Sensory Int Techniques, OT Therapeutic Activity, OT Aquatic Therapy and OT Therapeutic Exercise.    Passport items to be completed:  Passport items to be completed:  Perform bathroom transfers, complete dressing, complete feeding, get ready for the day, prepare a simple meal, participate in household tasks, adapt home for safety needs, demonstrate home exercise program, complete caregiver training     Goals:  Long term and short term goals have been discussed with patient and they are in agreement.    Occupational Therapy Goals (Active)     Problem: Bathing     Dates: Start: 10/17/21       Goal: STG-Within one week, patient will bathe with distant supervision using DME/AE as needed, no cues for safety     Dates: Start: 10/17/21             Problem: Functional  Transfers     Dates: Start: 10/17/21       Goal: STG-Within one week, patient will transfer to toilet with distant supervision using DME/AE as needed, no cues for safety     Dates: Start: 10/17/21             Problem: IADL's     Dates: Start: 10/17/21       Goal: STG-Within one week, patient will access kitchen area at SBA using FWW     Dates: Start: 10/17/21             Problem: OT Long Term Goals     Dates: Start: 10/17/21       Goal: LTG-By discharge, patient will complete basic self care tasks at Mod I using DME/AE as needed with least restrictive device     Dates: Start: 10/17/21          Goal: LTG-By discharge, patient will perform bathroom transfers at Mod I using DME/AE as needed with least restrictive device     Dates: Start: 10/17/21             Problem: Toileting     Dates: Start: 10/17/21       Goal: STG-Within one week, patient will complete toileting tasks with distant supervision using DME/AE as needed, no cues for safety     Dates: Start: 10/17/21

## 2021-10-17 NOTE — CARE PLAN
Problem: Knowledge Deficit - Standard  Goal: Patient and family/care givers will demonstrate understanding of plan of care, disease process/condition, diagnostic tests and medications  Outcome: Progressing     Problem: Psychosocial  Goal: Patient's level of anxiety will decrease  Outcome: Progressing     Problem: Nutrition  Goal: Patient's nutritional and fluid intake will be adequate or improve  Outcome: Progressing     Problem: Self Care  Goal: Patient will have the ability to perform ADLs independently or with assistance  Outcome: Progressing
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
## 2021-10-17 NOTE — CARE PLAN
Problem: Knowledge Deficit - Standard  Goal: Patient and family/care givers will demonstrate understanding of plan of care, disease process/condition, diagnostic tests and medications  Outcome: Progressing   Educated on the rehab program of 3hrs therapy today starting in am.

## 2021-10-17 NOTE — CARE PLAN
The patient is Stable - Low risk of patient condition declining or worsening    Shift Goals  Clinical Goals: safety      Problem: Skin Integrity  Goal: Patient's skin integrity will be maintained or improve  Outcome: Progressing: Pt able to reposition self in bed, no redness of bony prominences noted. Skin remains intact and free from new or accidental injury.       Problem: Mobility  Goal: Patient's capacity to carry out activities will improve  Outcome: Progressing: Pt uses a FWW to ambulate to the restroom with supervision of staff.
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
## 2021-10-17 NOTE — PROGRESS NOTES
Admitted to room 101W and oriented to the rehab program. Needs mod cues to call for assist with ambulating in room with FWW. He was ambulatory  At the main hospital. Alarms on bed. He stated he will try to call. Reinforcement needed. 2 RN skin check with Debra. No breakdown or redness noted.

## 2021-10-18 PROCEDURE — 99233 SBSQ HOSP IP/OBS HIGH 50: CPT | Performed by: PHYSICAL MEDICINE & REHABILITATION

## 2021-10-18 PROCEDURE — 92507 TX SP LANG VOICE COMM INDIV: CPT

## 2021-10-18 PROCEDURE — 770010 HCHG ROOM/CARE - REHAB SEMI PRIVAT*

## 2021-10-18 PROCEDURE — 97116 GAIT TRAINING THERAPY: CPT | Mod: CQ

## 2021-10-18 PROCEDURE — 97530 THERAPEUTIC ACTIVITIES: CPT | Mod: CO

## 2021-10-18 PROCEDURE — 700102 HCHG RX REV CODE 250 W/ 637 OVERRIDE(OP): Performed by: PHYSICAL MEDICINE & REHABILITATION

## 2021-10-18 PROCEDURE — 700111 HCHG RX REV CODE 636 W/ 250 OVERRIDE (IP): Performed by: PHYSICAL MEDICINE & REHABILITATION

## 2021-10-18 PROCEDURE — A9270 NON-COVERED ITEM OR SERVICE: HCPCS | Performed by: PHYSICAL MEDICINE & REHABILITATION

## 2021-10-18 PROCEDURE — 97112 NEUROMUSCULAR REEDUCATION: CPT | Mod: CQ

## 2021-10-18 PROCEDURE — 97530 THERAPEUTIC ACTIVITIES: CPT | Mod: CQ

## 2021-10-18 RX ORDER — LOSARTAN POTASSIUM 25 MG/1
25 TABLET ORAL DAILY
Status: DISCONTINUED | OUTPATIENT
Start: 2021-10-19 | End: 2021-10-19

## 2021-10-18 RX ORDER — VITAMIN B COMPLEX
1000 TABLET ORAL DAILY
Status: DISCONTINUED | OUTPATIENT
Start: 2021-10-19 | End: 2021-10-21 | Stop reason: HOSPADM

## 2021-10-18 RX ADMIN — ATORVASTATIN CALCIUM 80 MG: 40 TABLET, FILM COATED ORAL at 19:49

## 2021-10-18 RX ADMIN — ASPIRIN 81 MG CHEWABLE TABLET 81 MG: 81 TABLET CHEWABLE at 08:05

## 2021-10-18 RX ADMIN — MULTIPLE VITAMINS W/ MINERALS TAB 1 TABLET: TAB at 08:05

## 2021-10-18 RX ADMIN — ENOXAPARIN SODIUM 40 MG: 40 INJECTION SUBCUTANEOUS at 08:05

## 2021-10-18 ASSESSMENT — GAIT ASSESSMENTS
GAIT LEVEL OF ASSIST: CONTACT GUARD ASSIST
ASSISTIVE DEVICE: FRONT WHEEL WALKER;SINGLE POINT CANE

## 2021-10-18 NOTE — THERAPY
"Physical Therapy   Daily Treatment     Patient Name: Alexey Caballero  Age:  76 y.o., Sex:  male  Medical Record #: [id number]  Today's Date: 10/18/2021     Precautions  Precautions: Fall Risk  Comments: right omayra.   arm length discrepency     Subjective    Pt began standing up from the chair to the FWW upon therapist entering the room. Son reports that the patient walked himself out of the bathroom when he arrived     Objective       10/18/21 1401   Pain   Intervention Declines   Gait Functional Level of Assist    Gait Level Of Assist Contact Guard Assist   Assistive Device Front Wheel Walker;Single Point Cane   Distance (Feet)   (225', 250', 150')   Deviation Decreased Base Of Support;Decreased Heel Strike;Decreased Toe Off;Bradykinetic;Shuffled Gait;Other (Comment)  (R foot drag x 1 as fatigue increased)   Stairs Functional Level of Assist   Level of Assist with Stairs Contact Guard Assist   # of Stairs Climbed 12   Stairs Description Extra time;Hand rails;Supervision for safety;Verbal cueing   Bed Mobility    Sit to Stand Stand by Assist  (cues for hand placement)   Interdisciplinary Plan of Care Collaboration   IDT Collaboration with  Family / Caregiver   Patient Position at End of Therapy In Bed;Call Light within Reach   Collaboration Comments son present for therapy session   PT Total Time Spent   PT Individual Total Time Spent (Mins) 60   PT Charge Group   PT Gait Training 2   PT Neuromuscular Re-Education / Balance 1   PT Therapeutic Activities 1   Supervising Physical Therapist Jamal Cano     Dynamic balance alt toe taps to 8\" step with [place] for balance-requires cues for inc JORGE when returning to stance    Side stepping 20' to L/R with HHA, retro step x 8'    Assessment    The patient tolerated the session well with focus on progression of functional mobility. Pt present with mild instability and impulsivity       Plan     dynamic balance, reinforce use of call light, safety, transfer training, 18\" "     Passport items to be completed:  Get in/out of bed safely, in/out of a vehicle, safely use mobility device, walk or wheel around home/community, navigate up and down stairs, show how to get up/down from the ground, ensure home is accessible, demonstrate HEP, complete caregiver training    Physical Therapy Problems (Active)     Problem: Mobility     Dates: Start: 10/17/21       Goal: STG-Within one week, patient will ambulate household distance     Dates: Start: 10/17/21       Description: Patient will ambulate with single point cane 150ft level surface stand by assitance        Goal: STG-Within one week, patient will ambulate up/down a curb     Dates: Start: 10/17/21       Description: Patient will ascend and descend 14 steps with single hand rails min A           Problem: Mobility Transfers     Dates: Start: 10/17/21       Goal: STG-Within one week, patient will transfer bed to chair     Dates: Start: 10/17/21       Description: Patient will transfer to various surfaces with the use of single point cane SBA           Problem: PT-Long Term Goals     Dates: Start: 10/17/21       Goal: LTG-By discharge, patient will maintain balance     Dates: Start: 10/17/21       Description: Patient will demonstrate improved balance evidenced by KNOWLES score improvement of 8 points or greater       Goal: LTG-By discharge, patient will ambulate     Dates: Start: 10/17/21       Description: Pt will ambulate with no device level and uneven surface > 150 ft independent        Goal: LTG-By discharge, patient will transfer one surface to another     Dates: Start: 10/17/21       Description: Patient will transfer to various surfaces no device independent        Goal: LTG-By discharge, patient will ambulate up/down flight of stairs     Dates: Start: 10/17/21       Description: Patient will ascends and descend 14 steps with single hands rail simulating home environment independent

## 2021-10-18 NOTE — PROGRESS NOTES
"Rehab Progress Note     Encounter Date: 10/18/2021    CC: CVA, right sided ataxia    Interval Events (Subjective)  Patient sitting up in room. He reports he is doing well. He reports he has been working on his handwriting. Reviewed AM labs from this weekend and has low Vitamin D and ongoing elevated SBP. Denies NVD. Denies SOB. He would like someone to call his son to let him know the covid screen was negative so he can come to visit.     Objective:  VITAL SIGNS: /95   Pulse 61   Temp 36.4 °C (97.5 °F) (Tympanic)   Resp 18   Ht 1.727 m (5' 8\")   Wt 91.6 kg (201 lb 15.1 oz)   SpO2 93%   BMI 30.71 kg/m²   Gen: NAD  Psych: Mood and affect appropriate  CV: RRR, no edema  Resp: CTAB, no upper airway sounds  Abd: NTND  Neuro: AOx4, following commands    Recent Results (from the past 72 hour(s))   SARS-CoV-2 PCR (24 hour In-House): Collect NP swab in [place]    Collection Time: 10/16/21  2:02 PM    Specimen: Nasopharyngeal; Respirate   Result Value Ref Range    SARS-CoV-2 Source NP Swab     SARS-CoV-2 by PCR NotDetected    CBC with Differential    Collection Time: 10/17/21  6:25 AM   Result Value Ref Range    WBC 9.7 4.8 - 10.8 K/uL    RBC 4.72 4.70 - 6.10 M/uL    Hemoglobin 15.4 14.0 - 18.0 g/dL    Hematocrit 45.8 42.0 - 52.0 %    MCV 97.0 81.4 - 97.8 fL    MCH 32.6 27.0 - 33.0 pg    MCHC 33.6 (L) 33.7 - 35.3 g/dL    RDW 45.5 35.9 - 50.0 fL    Platelet Count 229 164 - 446 K/uL    MPV 12.7 9.0 - 12.9 fL    Neutrophils-Polys 57.70 44.00 - 72.00 %    Lymphocytes 29.00 22.00 - 41.00 %    Monocytes 7.60 0.00 - 13.40 %    Eosinophils 4.60 0.00 - 6.90 %    Basophils 0.70 0.00 - 1.80 %    Immature Granulocytes 0.40 0.00 - 0.90 %    Nucleated RBC 0.00 /100 WBC    Neutrophils (Absolute) 5.57 1.82 - 7.42 K/uL    Lymphs (Absolute) 2.80 1.00 - 4.80 K/uL    Monos (Absolute) 0.73 0.00 - 0.85 K/uL    Eos (Absolute) 0.44 0.00 - 0.51 K/uL    Baso (Absolute) 0.07 0.00 - 0.12 K/uL    Immature Granulocytes (abs) 0.04 0.00 - 0.11 K/uL "    NRBC (Absolute) 0.00 K/uL   Comp Metabolic Panel (CMP)    Collection Time: 10/17/21  6:25 AM   Result Value Ref Range    Sodium 139 135 - 145 mmol/L    Potassium 4.0 3.6 - 5.5 mmol/L    Chloride 106 96 - 112 mmol/L    Co2 25 20 - 33 mmol/L    Anion Gap 8.0 7.0 - 16.0    Glucose 100 (H) 65 - 99 mg/dL    Bun 15 8 - 22 mg/dL    Creatinine 0.88 0.50 - 1.40 mg/dL    Calcium 9.1 8.5 - 10.5 mg/dL    AST(SGOT) 31 12 - 45 U/L    ALT(SGPT) 36 2 - 50 U/L    Alkaline Phosphatase 59 30 - 99 U/L    Total Bilirubin 0.7 0.1 - 1.5 mg/dL    Albumin 4.2 3.2 - 4.9 g/dL    Total Protein 6.5 6.0 - 8.2 g/dL    Globulin 2.3 1.9 - 3.5 g/dL    A-G Ratio 1.8 g/dL   HEMOGLOBIN A1C    Collection Time: 10/17/21  6:25 AM   Result Value Ref Range    Glycohemoglobin 5.5 4.0 - 5.6 %    Est Avg Glucose 111 mg/dL   TSH with Reflex to FT4    Collection Time: 10/17/21  6:25 AM   Result Value Ref Range    TSH 12.400 (H) 0.380 - 5.330 uIU/mL   Vitamin D, 25-hydroxy (blood)    Collection Time: 10/17/21  6:25 AM   Result Value Ref Range    25-Hydroxy   Vitamin D 25 21 (L) 30 - 100 ng/mL   ESTIMATED GFR    Collection Time: 10/17/21  6:25 AM   Result Value Ref Range    GFR If African American >60 >60 mL/min/1.73 m 2    GFR If Non African American >60 >60 mL/min/1.73 m 2   FREE THYROXINE    Collection Time: 10/17/21  6:25 AM   Result Value Ref Range    Free T-4 0.95 0.93 - 1.70 ng/dL       Current Facility-Administered Medications   Medication Frequency   • Respiratory Therapy Consult Continuous RT   • Pharmacy Consult Request ...Pain Management Review 1 Each PHARMACY TO DOSE   • hydrALAZINE (APRESOLINE) tablet 25 mg Q8HRS PRN   • acetaminophen (Tylenol) tablet 650 mg Q4HRS PRN   • senna-docusate (PERICOLACE or SENOKOT S) 8.6-50 MG per tablet 2 Tablet BID    And   • polyethylene glycol/lytes (MIRALAX) PACKET 1 Packet QDAY PRN    And   • magnesium hydroxide (MILK OF MAGNESIA) suspension 30 mL QDAY PRN    And   • bisacodyl (DULCOLAX) suppository 10 mg QDAY  PRN   • omeprazole (PRILOSEC) capsule 20 mg DAILY   • artificial tears ophthalmic solution 1 Drop PRN   • benzocaine-menthol (CEPACOL) lozenge 1 Lozenge Q2HRS PRN   • mag hydrox-al hydrox-simeth (MAALOX PLUS ES or MYLANTA DS) suspension 20 mL Q2HRS PRN   • ondansetron (ZOFRAN ODT) dispertab 4 mg 4X/DAY PRN    Or   • ondansetron (ZOFRAN) syringe/vial injection 4 mg 4X/DAY PRN   • traZODone (DESYREL) tablet 50 mg QHS PRN   • sodium chloride (OCEAN) 0.65 % nasal spray 2 Spray PRN   • oxyCODONE immediate-release (ROXICODONE) tablet 2.5 mg Q3HRS PRN    Or   • oxyCODONE immediate-release (ROXICODONE) tablet 5 mg Q3HRS PRN   • traMADol (ULTRAM) 50 MG tablet 50 mg Q4HRS PRN   • aspirin (ASA) chewable tab 81 mg DAILY   • atorvastatin (LIPITOR) tablet 80 mg Q EVENING   • enoxaparin (LOVENOX) inj 40 mg DAILY   • therapeutic multivitamin-minerals (THERAGRAN-M) tablet 1 Tablet DAILY       Orders Placed This Encounter   Procedures   • Diet Order Diet: Level 7 - Easy to Chew (no raw onions per pt preference); Liquid level: Level 0 - Thin     Standing Status:   Standing     Number of Occurrences:   1     Order Specific Question:   Diet:     Answer:   Level 7 - Easy to Chew [22]     Comments:   no raw onions per pt preference     Order Specific Question:   Liquid level     Answer:   Level 0 - Thin       Assessment:  Active Hospital Problems    Diagnosis    • *Ischemic stroke (HCC)    • Bilateral carotid artery stenosis    • Coronary artery disease involving native coronary artery of native heart without angina pectoris    • Essential hypertension        Medical Decision Making and Plan:  Left posterior limb internal capsule CVA  - greatest deficits are R sided weakness, impaired coordination and dysphagia   - continue with secondary stroke ppx of ASA and statin   -Continue with comprehensive IRF therapy program with 3hrs of therapy 5 days per week with PT/OT/SLP to improve strength, endurance, balance and stability      Dysphagia  -  monitor for signs/symtomps of aspiration  - SLP for asphasia and dysphagia   - currently on easy to chew dysphagia diet with thins      HTN   - monitor for hypotension when up with therapy   - continue on Losartan 25mg qday   -Needs good control, elevated. Losartan was not started on admission. Will start     Carotid Artery stenossis   -  CTA neck showed 50-60 % stenosis of the L ICA with irregularity and soft/calcified plaque  - continue with ASA and statin      Bowel  - constipation prevention with senna and colace  - miralax prn      Bladder  - monitor for urinary retention and signs/symptpoms of UTI  - begin void trials, initiate PVR, cath if retaining > 200  - bladder scan if no void > 6hrs      Obesity due to excess calories  -BMI of 30.7 on admission, meets medical criteria  -Dietitian to consult.     Vitamin D deficiency  - 21 on admission. Start 1000 U     DVT ppx: lovenox     Total time:  36 minutes.  I spent greater than 50% of the time for patient care and coordination on this date, including unit/floor time, and face-to-face time with the patient as per assessment and plan above.  Discussion included admission labs, start Vitamin D, elevated SBP, start Losartan, and discharge planning as doing well early.     Temi Travis M.D.

## 2021-10-18 NOTE — THERAPY
"Occupational Therapy  Daily Treatment     Patient Name: Alexey Caballero  Age:  76 y.o., Sex:  male  Medical Record #: [id number]  Today's Date: 10/18/2021     Precautions  Precautions: Fall Risk  Comments: right omayra.   arm length discrepency          Subjective    \"  You've got to work to get results.\"     Objective     10/18/21 0831   Precautions   Precautions Fall Risk   Comments right omayra.   arm length discrepency    Sitting Upper Body Exercises   Chest Press 2 sets of 10;Right    Front Arm Raise 2 sets of 10;Right    Shoulder Press 2 sets of 10;Right    Internal Shoulder Rotation 2 sets of 10;Right    External Shoulder Rotation 2 sets of 10;Right    Pronation / Supination 2 sets of 10;Right    Upper Extremity Bike Level 3 Resistance  (x 5 minutes  motomed)   Comments performed with 4lb weight    Fine Motor / Dexterity    Fine Motor / Dexterity Interventions  Seated at table focus on  graded right FMC tasks   1. Dice .  individual finger to thumb pinch for placement . in hand manipulation picking up dropping into palm placing and or stacking on table top via index finger and thumb.   2 small beaded pegs.   individual finger to thumb pinch  to pull pegs out of boar.     inhand manipulation pulling out of board droping into palm then pitch into conatiner via index finger and thumb.  Coins   picking up off table dropping into palm  then pitch into dish via index finger and thumb.    Interdisciplinary Plan of Care Collaboration   Patient Position at End of Therapy Seated;Call Light within Reach;Tray Table within Reach   OT Total Time Spent   OT Individual Total Time Spent (Mins) 60   OT Charge Group   OT Therapy Activity 4    FWW  Room <-> gym with  Close SBA  And cues for  Pacing for improved technique with stepping of right foot     Assessment     motivated to make functional gains.      Participates to the best of his ability with  All tasks presented   anticipate short stay     Strengths: Able to follow " instructions, Alert and oriented, Independent prior level of function, Making steady progress towards goals, Manages pain appropriately, Motivated for self care and independence, Pleasant and cooperative, Supportive family, Willingly participates in therapeutic activities  Barriers: Decreased endurance, Generalized weakness, Hemiparesis, Impaired balance, Impaired activity tolerance    Plan     ADL  IADL , related mobility and transfer at FWW level.  Right UE  Strengthening and FMC    standing balance / endurance      Passport items to be completed:  Passport items to be completed:  Perform bathroom transfers, complete dressing, , get ready for the day, prepare a simple meal, participate in household tasks, adapt home for safety needs, demonstrate home exercise program, complete caregiver training     Occupational Therapy Goals (Active)     Problem: Bathing     Dates: Start: 10/17/21       Goal: STG-Within one week, patient will bathe with distant supervision using DME/AE as needed, no cues for safety     Dates: Start: 10/17/21             Problem: Functional Transfers     Dates: Start: 10/17/21       Goal: STG-Within one week, patient will transfer to toilet with distant supervision using DME/AE as needed, no cues for safety     Dates: Start: 10/17/21             Problem: IADL's     Dates: Start: 10/17/21       Goal: STG-Within one week, patient will access kitchen area at SBA using FWW     Dates: Start: 10/17/21             Problem: OT Long Term Goals     Dates: Start: 10/17/21       Goal: LTG-By discharge, patient will complete basic self care tasks at Mod I using DME/AE as needed with least restrictive device     Dates: Start: 10/17/21          Goal: LTG-By discharge, patient will perform bathroom transfers at Mod I using DME/AE as needed with least restrictive device     Dates: Start: 10/17/21             Problem: Toileting     Dates: Start: 10/17/21       Goal: STG-Within one week, patient will complete  toileting tasks with distant supervision using DME/AE as needed, no cues for safety     Dates: Start: 10/17/21

## 2021-10-18 NOTE — CARE PLAN
Problem: Bowel Elimination  Goal: Patient will participate in bowel management program  Note: Pt refused scheduled senna at hs.Continent of bowel per report.LBM 10/17.Education given on the importance of bowel medication, pt verbalized understanding.Will continue to monitor.

## 2021-10-18 NOTE — CARE PLAN
Problem: Bowel Elimination  Goal: Patient will participate in bowel management program  Note: Pt. is continent of bowels. LBM 10/17/21. Pt. refused his scheduled Senna and Prilosec.      The patient is Stable - Low risk of patient condition declining or worsening    Shift Goals  Clinical Goals: safety    Progress made toward(s) clinical / shift goals:      Patient is not progressing towards the following goals:

## 2021-10-18 NOTE — THERAPY
Speech Language Pathology  Daily Treatment     Patient Name: Alexey Caballero  Age:  76 y.o., Sex:  male  Medical Record #: [id number]  Today's Date: 10/18/2021     Precautions  Precautions: Fall Risk  Comments: right omayra.   arm length discrepency     Subjective    Patient pleasant, cooperative, talkative.     Objective       10/18/21 0931   Speech / Dysarthria   Speech / Dysarthria X   Articulation Training Within Functional Limits (6-7)   Intensity / Loudness Training Within Functional Limits (6-7)   Rate Training Supervision (5)   SLP Total Time Spent   SLP Individual Total Time Spent (Mins) 60   Treatment Charges   Charges Yes   SLP Treatment - Individual Speech Language Treatment - Individual       Assessment    Assisted patient to ST office with SBA with FWW.   Patient was pretty fast.  Obtained assistance from CNA with wheel chair follow.   Patient demonstrated 98% intelligibility in conversation and 100% in challenging articulatory tasks.  He demonstrated 2 mis articulations during 1  Hour session which he was able to independently correct.  He benefits from slowing his rate of speech. He reports feeling his speech is not quite back to normal.  Patient self monitored well with self pacing on his return to room, at an appropriate pace.    Strengths: Able to follow instructions, Alert and oriented, Good carryover of learning, Independent prior level of function, Motivated for self care and independence, Pleasant and cooperative, Supportive family, Willingly participates in therapeutic activities  Barriers: Impaired insight/denial of deficits    Plan    Target speech intelligibility.  Assess for readiness to discontinue ST services.    Passport items to be completed:  Express basic needs, understand food/liquid recommendations, consistently follow swallow precautions, manage finances, manage medications, arrive to therapy appointments on time, complete daily memory log entries, solve problems related to  safety situations, review education related to hospitalization, complete caregiver training     Speech Therapy Problems (Active)     Problem: Expression STGs     Dates: Start: 10/17/21       Goal: STG-Within one week, patient will utilize trained speech strategies during 80% of conversational discourse with min cues.     Dates: Start: 10/17/21          Goal: STG-Within one week, patient will increase self-speech rating from 6/10 to 8/10 using a likert scale.      Dates: Start: 10/17/21             Problem: Problem Solving STGs     Dates: Start: 10/17/21       Goal: STG-Within one week, patient will participate in stroke education and recall warning signs/risk of stroke with 100% accuracy with min cues.      Dates: Start: 10/17/21             Problem: Speech/Swallowing LTGs     Dates: Start: 10/17/21       Goal: LTG-By discharge, patient will utilize trained speech strategies indep during conversational discourse.     Dates: Start: 10/17/21

## 2021-10-19 PROCEDURE — 97112 NEUROMUSCULAR REEDUCATION: CPT

## 2021-10-19 PROCEDURE — 97116 GAIT TRAINING THERAPY: CPT

## 2021-10-19 PROCEDURE — 700111 HCHG RX REV CODE 636 W/ 250 OVERRIDE (IP): Performed by: PHYSICAL MEDICINE & REHABILITATION

## 2021-10-19 PROCEDURE — 97129 THER IVNTJ 1ST 15 MIN: CPT

## 2021-10-19 PROCEDURE — 97530 THERAPEUTIC ACTIVITIES: CPT

## 2021-10-19 PROCEDURE — 99232 SBSQ HOSP IP/OBS MODERATE 35: CPT | Performed by: PHYSICAL MEDICINE & REHABILITATION

## 2021-10-19 PROCEDURE — 92507 TX SP LANG VOICE COMM INDIV: CPT

## 2021-10-19 PROCEDURE — 97535 SELF CARE MNGMENT TRAINING: CPT

## 2021-10-19 PROCEDURE — 770010 HCHG ROOM/CARE - REHAB SEMI PRIVAT*

## 2021-10-19 PROCEDURE — A9270 NON-COVERED ITEM OR SERVICE: HCPCS | Performed by: PHYSICAL MEDICINE & REHABILITATION

## 2021-10-19 PROCEDURE — 700102 HCHG RX REV CODE 250 W/ 637 OVERRIDE(OP): Performed by: PHYSICAL MEDICINE & REHABILITATION

## 2021-10-19 RX ORDER — LOSARTAN POTASSIUM 25 MG/1
50 TABLET ORAL DAILY
Status: DISCONTINUED | OUTPATIENT
Start: 2021-10-20 | End: 2021-10-21 | Stop reason: HOSPADM

## 2021-10-19 RX ADMIN — ASPIRIN 81 MG CHEWABLE TABLET 81 MG: 81 TABLET CHEWABLE at 08:39

## 2021-10-19 RX ADMIN — ENOXAPARIN SODIUM 40 MG: 40 INJECTION SUBCUTANEOUS at 08:41

## 2021-10-19 RX ADMIN — MULTIPLE VITAMINS W/ MINERALS TAB 1 TABLET: TAB at 08:39

## 2021-10-19 RX ADMIN — LOSARTAN POTASSIUM 25 MG: 25 TABLET, FILM COATED ORAL at 09:42

## 2021-10-19 RX ADMIN — ATORVASTATIN CALCIUM 80 MG: 40 TABLET, FILM COATED ORAL at 19:51

## 2021-10-19 RX ADMIN — Medication 1000 UNITS: at 09:42

## 2021-10-19 ASSESSMENT — ACTIVITIES OF DAILY LIVING (ADL)
BED_CHAIR_WHEELCHAIR_TRANSFER_DESCRIPTION: ADAPTIVE EQUIPMENT;SUPERVISION FOR SAFETY;VERBAL CUEING;SET-UP OF EQUIPMENT
TUB_SHOWER_TRANSFER_DESCRIPTION: GRAB BAR

## 2021-10-19 ASSESSMENT — GAIT ASSESSMENTS
DEVIATION: DECREASED BASE OF SUPPORT;SHUFFLED GAIT;DECREASED TOE OFF;DECREASED HEEL STRIKE
DISTANCE (FEET): 250
GAIT LEVEL OF ASSIST: CONTACT GUARD ASSIST
ASSISTIVE DEVICE: FRONT WHEEL WALKER;NONE

## 2021-10-19 NOTE — THERAPY
Speech Language Pathology  Daily Treatment     Patient Name: Alexey Caballero  Age:  76 y.o., Sex:  male  Medical Record #: [id number]  Today's Date: 10/19/2021     Precautions  Precautions: Fall Risk  Comments: right omayra.   arm length discrepency     Subjective    Patient pleasant and cooperative.     Objective       10/19/21 1310   Speech / Dysarthria   Articulation Training Within Functional Limits (6-7)   Intensity / Loudness Training Within Functional Limits (6-7)   Rate Training Within Functional Limits (6-7)   Functional Level of Assist   Comprehension Independent   Expression Independent   Social Interaction Independent   Problem Solving Independent   Memory Independent   Interdisciplinary Plan of Care Collaboration   IDT Collaboration with  Family / Caregiver   Collaboration Comments re:  CLOF  compensatory speaking strategies.   SLP Total Time Spent   SLP Individual Total Time Spent (Mins) 60   Treatment Charges   SLP Treatment - Individual Speech Language Treatment - Individual   SLP Cognitive Skill Development First 15 Minutes 1       Assessment    Patient walked to  offices with CGA without device and back at end of session.  He needed rests intermittently and min verbal cues to maintain quality of gait. Patient was accompanied by his son Guillaume, to session.  Patient demonstrated 1 misarticulation in conversation which patient self corrected across the hour of ST.  Patient is able to recall and implement compensatory speaking strategies mod I in conversation  99% of opportunity.  Patient's son reported several concerns including patient had been demonstrating increased phlem and throat clearing over a couple of years and decreased voice volume, describing that patient once had a commanding voice and voice volume, and had increased difficulty communicating in motor cycle helmets or when turned away and no longer speaking face to face over recent years. Patient demonstrates adequate volume in face to  face conversation  Recommended patient seek a referral for ENT consult to visualize vocal cords and follow up outpatient ST as appropriate and consult with physician re: phlegm.  Patient demonstrates understanding of stroke education.    Strengths: Able to follow instructions, Alert and oriented, Good carryover of learning, Independent prior level of function, Motivated for self care and independence, Pleasant and cooperative, Supportive family, Willingly participates in therapeutic activities  Barriers: Impaired insight/denial of deficits    Plan    Discuss discontinue ST with physician and IDT.    Passport items to be completed:  Express basic needs, understand food/liquid recommendations, consistently follow swallow precautions, manage finances, manage medications, arrive to therapy appointments on time, complete daily memory log entries, solve problems related to safety situations, review education related to hospitalization, complete caregiver training     Speech Therapy Problems (Active)     Problem: Expression STGs     Dates: Start: 10/17/21       Goal: STG-Within one week, patient will utilize trained speech strategies during 80% of conversational discourse with min cues.     Dates: Start: 10/17/21          Goal: STG-Within one week, patient will increase self-speech rating from 6/10 to 8/10 using a likert scale.      Dates: Start: 10/17/21             Problem: Problem Solving STGs     Dates: Start: 10/17/21       Goal: STG-Within one week, patient will participate in stroke education and recall warning signs/risk of stroke with 100% accuracy with min cues.      Dates: Start: 10/17/21             Problem: Speech/Swallowing LTGs     Dates: Start: 10/17/21       Goal: LTG-By discharge, patient will utilize trained speech strategies indep during conversational discourse.     Dates: Start: 10/17/21

## 2021-10-19 NOTE — CARE PLAN
Problem: Bladder / Voiding  Goal: Patient will establish and maintain regular urinary output  Note: Pt is continent of bladder.Voiding adequate amount of urine.Denies any discomfort or dysuria, afebrile.Will continue to monitor.     Problem: Bowel Elimination  Goal: Patient will participate in bowel management program  Note: Pt refused scheduled senna at .Remains continent of bowel.LBM 10/18.Will continue to monitor.

## 2021-10-19 NOTE — CARE PLAN
Problem: Speech/Swallowing LTGs  Goal: LTG-By discharge, patient will utilize trained speech strategies indep during conversational discourse.  Outcome: Met  Note: Patient is intelligible in conversation and independently uses compensatory speaking strategies with 90% -100% acc.     Problem: Expression STGs  Goal: STG-Within one week, patient will utilize trained speech strategies during 80% of conversational discourse with min cues.  Outcome: Met  Note: Patient is intelligible in conversation and independently uses compensatory speaking strategies with 90% -100% acc.  Goal: STG-Within one week, patient will increase self-speech rating from 6/10 to 8/10 using a likert scale.   Note: Patient rates his speech at 8/10 on likert scale.     Problem: Problem Solving STGs  Goal: STG-Within one week, patient will participate in stroke education and recall warning signs/risk of stroke with 100% accuracy with min cues.   Outcome: Met  Note: STroke education provided and completed.  Patient demonstrates understanding of stroke education.

## 2021-10-19 NOTE — THERAPY
Physical Therapy   Daily Treatment     Patient Name: Alexey Caballero  Age:  76 y.o., Sex:  male  Medical Record #: [id number]  Today's Date: 10/19/2021     Precautions  Precautions: Fall Risk  Comments: right omayra.   arm length discrepency     Subjective    Patient agrees to therapy session.      Objective       10/19/21 0831   Gait Functional Level of Assist    Gait Level Of Assist Contact Guard Assist  (see note for no AD gait)   Assistive Device Front Wheel Walker;None   Distance (Feet) 250  (w/ FWW room to/from [facility])   # of Times Distance was Traveled 2   Deviation Decreased Base Of Support;Shuffled Gait;Decreased Toe Off;Decreased Heel Strike  (on RLE)   Transfer Functional Level of Assist   Bed, Chair, Wheelchair Transfer Stand by Assist   Bed Chair Wheelchair Transfer Description Adaptive equipment;Supervision for safety;Verbal cueing;Set-up of equipment   Bed Mobility    Sit to Stand Stand by Assist   Interdisciplinary Plan of Care Collaboration   IDT Collaboration with  Nursing   Patient Position at End of Therapy Seated;Call Light within Reach;Tray Table within Reach   Collaboration Comments nurse administers meds prior to session   PT Total Time Spent   PT Individual Total Time Spent (Mins) 60   PT Charge Group   PT Gait Training 1   PT Neuromuscular Re-Education / Balance 3   dynamic gait without AD approx 500 feet, normal gait, head turns L/R, head turns up/down.  Min A x 1 for LOB with head turns otherwise CGA.  50 ft: sidestepping B, retro gait with CGA.     STS training from lowered mat table x 2 each: normal JORGE, WBOS, split stance w/ LLE back then RLE back, eyes closed, Unil LE on 2 inch box B, Unil LE on airex pad B, Unil on dynadisk B.      Split stance static/dyanmic balance w/ RLE on dynadisk weight shifts L/R and ant/post x 10 each.      Assessment    Patient reports most difficult STS with dynadisk under LLE due to making his RLE complete more work.  Able to complete all tasks with CGA.  "One LOB with dynamic gait tasks requries min A for steadying.  Patient hyperverbose during session and requires redirection frequently.        Plan     dynamic balance, reinforce use of call light, safety, transfer training, 18\"      Passport items to be completed:  Get in/out of bed safely, in/out of a vehicle, safely use mobility device, walk or wheel around home/community, navigate up and down stairs, show how to get up/down from the ground, ensure home is accessible, demonstrate HEP, complete caregiver training    Physical Therapy Problems (Active)     Problem: Mobility     Dates: Start: 10/17/21       Goal: STG-Within one week, patient will ambulate household distance     Dates: Start: 10/17/21       Description: Patient will ambulate with single point cane 150ft level surface stand by assitance        Goal: STG-Within one week, patient will ambulate up/down a curb     Dates: Start: 10/17/21       Description: Patient will ascend and descend 14 steps with single hand rails min A           Problem: Mobility Transfers     Dates: Start: 10/17/21       Goal: STG-Within one week, patient will transfer bed to chair     Dates: Start: 10/17/21       Description: Patient will transfer to various surfaces with the use of single point cane SBA           Problem: PT-Long Term Goals     Dates: Start: 10/17/21       Goal: LTG-By discharge, patient will maintain balance     Dates: Start: 10/17/21       Description: Patient will demonstrate improved balance evidenced by KNOWLES score improvement of 8 points or greater       Goal: LTG-By discharge, patient will ambulate     Dates: Start: 10/17/21       Description: Pt will ambulate with no device level and uneven surface > 150 ft independent        Goal: LTG-By discharge, patient will transfer one surface to another     Dates: Start: 10/17/21       Description: Patient will transfer to various surfaces no device independent        Goal: LTG-By discharge, patient will ambulate " up/down flight of stairs     Dates: Start: 10/17/21       Description: Patient will ascends and descend 14 steps with single hands rail simulating home environment independent

## 2021-10-19 NOTE — PROGRESS NOTES
"Rehab Progress Note     Encounter Date: 10/19/2021    CC: CVA, right sided ataxia    Interval Events (Subjective)  Patient sitting up in therapy gym. He reports he is doing well. Denies pain. He is working on using his right arm more. He denies NVD. Denies SOB. Has had elevated SBP.     Objective:  VITAL SIGNS: /87   Pulse 63   Temp 36.3 °C (97.4 °F) (Tympanic)   Resp 18   Ht 1.727 m (5' 8\")   Wt 91.6 kg (201 lb 15.1 oz)   SpO2 95%   BMI 30.71 kg/m²   Gen: NAD  Psych: Mood and affect appropriate  CV: RRR, no edema  Resp: CTAB, no upper airway sounds  Abd: NTND  Neuro: AOx4, following commands  Unchanged from 10/18/21    Recent Results (from the past 72 hour(s))   CBC with Differential    Collection Time: 10/17/21  6:25 AM   Result Value Ref Range    WBC 9.7 4.8 - 10.8 K/uL    RBC 4.72 4.70 - 6.10 M/uL    Hemoglobin 15.4 14.0 - 18.0 g/dL    Hematocrit 45.8 42.0 - 52.0 %    MCV 97.0 81.4 - 97.8 fL    MCH 32.6 27.0 - 33.0 pg    MCHC 33.6 (L) 33.7 - 35.3 g/dL    RDW 45.5 35.9 - 50.0 fL    Platelet Count 229 164 - 446 K/uL    MPV 12.7 9.0 - 12.9 fL    Neutrophils-Polys 57.70 44.00 - 72.00 %    Lymphocytes 29.00 22.00 - 41.00 %    Monocytes 7.60 0.00 - 13.40 %    Eosinophils 4.60 0.00 - 6.90 %    Basophils 0.70 0.00 - 1.80 %    Immature Granulocytes 0.40 0.00 - 0.90 %    Nucleated RBC 0.00 /100 WBC    Neutrophils (Absolute) 5.57 1.82 - 7.42 K/uL    Lymphs (Absolute) 2.80 1.00 - 4.80 K/uL    Monos (Absolute) 0.73 0.00 - 0.85 K/uL    Eos (Absolute) 0.44 0.00 - 0.51 K/uL    Baso (Absolute) 0.07 0.00 - 0.12 K/uL    Immature Granulocytes (abs) 0.04 0.00 - 0.11 K/uL    NRBC (Absolute) 0.00 K/uL   Comp Metabolic Panel (CMP)    Collection Time: 10/17/21  6:25 AM   Result Value Ref Range    Sodium 139 135 - 145 mmol/L    Potassium 4.0 3.6 - 5.5 mmol/L    Chloride 106 96 - 112 mmol/L    Co2 25 20 - 33 mmol/L    Anion Gap 8.0 7.0 - 16.0    Glucose 100 (H) 65 - 99 mg/dL    Bun 15 8 - 22 mg/dL    Creatinine 0.88 0.50 - " 1.40 mg/dL    Calcium 9.1 8.5 - 10.5 mg/dL    AST(SGOT) 31 12 - 45 U/L    ALT(SGPT) 36 2 - 50 U/L    Alkaline Phosphatase 59 30 - 99 U/L    Total Bilirubin 0.7 0.1 - 1.5 mg/dL    Albumin 4.2 3.2 - 4.9 g/dL    Total Protein 6.5 6.0 - 8.2 g/dL    Globulin 2.3 1.9 - 3.5 g/dL    A-G Ratio 1.8 g/dL   HEMOGLOBIN A1C    Collection Time: 10/17/21  6:25 AM   Result Value Ref Range    Glycohemoglobin 5.5 4.0 - 5.6 %    Est Avg Glucose 111 mg/dL   TSH with Reflex to FT4    Collection Time: 10/17/21  6:25 AM   Result Value Ref Range    TSH 12.400 (H) 0.380 - 5.330 uIU/mL   Vitamin D, 25-hydroxy (blood)    Collection Time: 10/17/21  6:25 AM   Result Value Ref Range    25-Hydroxy   Vitamin D 25 21 (L) 30 - 100 ng/mL   ESTIMATED GFR    Collection Time: 10/17/21  6:25 AM   Result Value Ref Range    GFR If African American >60 >60 mL/min/1.73 m 2    GFR If Non African American >60 >60 mL/min/1.73 m 2   FREE THYROXINE    Collection Time: 10/17/21  6:25 AM   Result Value Ref Range    Free T-4 0.95 0.93 - 1.70 ng/dL       Current Facility-Administered Medications   Medication Frequency   • losartan (COZAAR) tablet 25 mg DAILY   • vitamin D3 (cholecalciferol) tablet 1,000 Units DAILY   • Respiratory Therapy Consult Continuous RT   • hydrALAZINE (APRESOLINE) tablet 25 mg Q8HRS PRN   • acetaminophen (Tylenol) tablet 650 mg Q4HRS PRN   • senna-docusate (PERICOLACE or SENOKOT S) 8.6-50 MG per tablet 2 Tablet BID    And   • polyethylene glycol/lytes (MIRALAX) PACKET 1 Packet QDAY PRN    And   • magnesium hydroxide (MILK OF MAGNESIA) suspension 30 mL QDAY PRN    And   • bisacodyl (DULCOLAX) suppository 10 mg QDAY PRN   • omeprazole (PRILOSEC) capsule 20 mg DAILY   • artificial tears ophthalmic solution 1 Drop PRN   • benzocaine-menthol (CEPACOL) lozenge 1 Lozenge Q2HRS PRN   • mag hydrox-al hydrox-simeth (MAALOX PLUS ES or MYLANTA DS) suspension 20 mL Q2HRS PRN   • ondansetron (ZOFRAN ODT) dispertab 4 mg 4X/DAY PRN    Or   • ondansetron  (ZOFRAN) syringe/vial injection 4 mg 4X/DAY PRN   • traZODone (DESYREL) tablet 50 mg QHS PRN   • sodium chloride (OCEAN) 0.65 % nasal spray 2 Spray PRN   • oxyCODONE immediate-release (ROXICODONE) tablet 2.5 mg Q3HRS PRN    Or   • oxyCODONE immediate-release (ROXICODONE) tablet 5 mg Q3HRS PRN   • traMADol (ULTRAM) 50 MG tablet 50 mg Q4HRS PRN   • aspirin (ASA) chewable tab 81 mg DAILY   • atorvastatin (LIPITOR) tablet 80 mg Q EVENING   • enoxaparin (LOVENOX) inj 40 mg DAILY   • therapeutic multivitamin-minerals (THERAGRAN-M) tablet 1 Tablet DAILY       Orders Placed This Encounter   Procedures   • Diet Order Diet: Level 7 - Easy to Chew (no raw onions per pt preference); Liquid level: Level 0 - Thin     Standing Status:   Standing     Number of Occurrences:   1     Order Specific Question:   Diet:     Answer:   Level 7 - Easy to Chew [22]     Comments:   no raw onions per pt preference     Order Specific Question:   Liquid level     Answer:   Level 0 - Thin       Assessment:  Active Hospital Problems    Diagnosis    • *Ischemic stroke (HCC)    • Bilateral carotid artery stenosis    • Coronary artery disease involving native coronary artery of native heart without angina pectoris    • Essential hypertension        Medical Decision Making and Plan:  Left posterior limb internal capsule CVA  - greatest deficits are R sided weakness, impaired coordination and dysphagia   - continue with secondary stroke ppx of ASA and statin   -Continue with comprehensive IRF therapy program with 3hrs of therapy 5 days per week with PT/OT/SLP to improve strength, endurance, balance and stability      Dysphagia  - monitor for signs/symtomps of aspiration  - SLP for asphasia and dysphagia   - currently on easy to chew dysphagia diet with thins      HTN   - monitor for hypotension when up with therapy   - continue on Losartan 25mg qday   -Needs good control, elevated. Losartan was not started on admission. Will start. Increase to 50  mg     Carotid Artery stenossis   -  CTA neck showed 50-60 % stenosis of the L ICA with irregularity and soft/calcified plaque  - continue with ASA and statin      Bowel  - constipation prevention with senna and colace  - miralax prn      Bladder  - monitor for urinary retention and signs/symptpoms of UTI  - begin void trials, initiate PVR, cath if retaining > 200  - bladder scan if no void > 6hrs      Obesity due to excess calories  -BMI of 30.7 on admission, meets medical criteria  -Dietitian to consult.     Vitamin D deficiency  - 21 on admission. Start 1000 U     DVT ppx: lovenox     Total time:  26 minutes.  I spent greater than 50% of the time for patient care, counseling, and coordination on this date, including unit/floor time, and face-to-face time with the patient as per interval events and assessment and plan above. Topics discussed included improving right sided strength, elevated SBP and increase ARB.     Temi Travis M.D.
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
## 2021-10-19 NOTE — PROGRESS NOTES
Patient care assumed. Report received from [facility] GERARD Grullon. Patient is alert and calm, resting in bed. Call light and bedside table within reach. Will continue to monitor.

## 2021-10-19 NOTE — CARE PLAN
The patient is Watcher - Medium risk of patient condition declining or worsening    Shift Goals  Clinical Goals: Safety      Problem: Bowel Elimination  Goal: Patient will participate in bowel management program  Note: Patient refused scheduled stool softeners and prilosec, last BM: 10/19/21 per patient.     Problem: Skin Integrity  Goal: Patient's skin integrity will be maintained or improve  Note: Patient's skin remains intact and free from new or accidental injury this shift.  Will continue to monitor.

## 2021-10-20 PROCEDURE — 97116 GAIT TRAINING THERAPY: CPT

## 2021-10-20 PROCEDURE — 97530 THERAPEUTIC ACTIVITIES: CPT

## 2021-10-20 PROCEDURE — 700111 HCHG RX REV CODE 636 W/ 250 OVERRIDE (IP): Performed by: PHYSICAL MEDICINE & REHABILITATION

## 2021-10-20 PROCEDURE — 97535 SELF CARE MNGMENT TRAINING: CPT

## 2021-10-20 PROCEDURE — 97110 THERAPEUTIC EXERCISES: CPT

## 2021-10-20 PROCEDURE — 770010 HCHG ROOM/CARE - REHAB SEMI PRIVAT*

## 2021-10-20 PROCEDURE — A9270 NON-COVERED ITEM OR SERVICE: HCPCS | Performed by: PHYSICAL MEDICINE & REHABILITATION

## 2021-10-20 PROCEDURE — 700102 HCHG RX REV CODE 250 W/ 637 OVERRIDE(OP): Performed by: PHYSICAL MEDICINE & REHABILITATION

## 2021-10-20 PROCEDURE — 99232 SBSQ HOSP IP/OBS MODERATE 35: CPT | Performed by: PHYSICAL MEDICINE & REHABILITATION

## 2021-10-20 PROCEDURE — 97112 NEUROMUSCULAR REEDUCATION: CPT

## 2021-10-20 RX ORDER — CALCIUM CARBONATE 500 MG/1
500 TABLET, CHEWABLE ORAL 4 TIMES DAILY PRN
Status: DISCONTINUED | OUTPATIENT
Start: 2021-10-20 | End: 2021-10-21 | Stop reason: HOSPADM

## 2021-10-20 RX ADMIN — MULTIPLE VITAMINS W/ MINERALS TAB 1 TABLET: TAB at 08:24

## 2021-10-20 RX ADMIN — ENOXAPARIN SODIUM 40 MG: 40 INJECTION SUBCUTANEOUS at 08:25

## 2021-10-20 RX ADMIN — ASPIRIN 81 MG CHEWABLE TABLET 81 MG: 81 TABLET CHEWABLE at 08:24

## 2021-10-20 RX ADMIN — ATORVASTATIN CALCIUM 80 MG: 40 TABLET, FILM COATED ORAL at 20:25

## 2021-10-20 RX ADMIN — Medication 1000 UNITS: at 08:24

## 2021-10-20 RX ADMIN — LOSARTAN POTASSIUM 50 MG: 25 TABLET, FILM COATED ORAL at 08:24

## 2021-10-20 ASSESSMENT — ACTIVITIES OF DAILY LIVING (ADL)
TOILET_TRANSFER_DESCRIPTION: GRAB BAR;ADAPTIVE EQUIPMENT;VERBAL CUEING;SUPERVISION FOR SAFETY
TOILET_TRANSFER_DESCRIPTION: GRAB BAR;SET-UP OF EQUIPMENT;SUPERVISION FOR SAFETY;VERBAL CUEING;INCREASED TIME
TOILETING_LEVEL_OF_ASSIST_DESCRIPTION: GRAB BAR;INCREASED TIME;SET-UP OF EQUIPMENT;SUPERVISION FOR SAFETY
BED_CHAIR_WHEELCHAIR_TRANSFER_DESCRIPTION: ADAPTIVE EQUIPMENT;VERBAL CUEING;SUPERVISION FOR SAFETY
BED_CHAIR_WHEELCHAIR_TRANSFER_DESCRIPTION: ADAPTIVE EQUIPMENT;SET-UP OF EQUIPMENT;SUPERVISION FOR SAFETY;VERBAL CUEING

## 2021-10-20 ASSESSMENT — GAIT ASSESSMENTS
ASSISTIVE DEVICE: FRONT WHEEL WALKER;SINGLE POINT CANE
DEVIATION: DECREASED BASE OF SUPPORT;SHUFFLED GAIT;DECREASED HEEL STRIKE
GAIT LEVEL OF ASSIST: STAND BY ASSIST
DISTANCE (FEET): 200
GAIT LEVEL OF ASSIST: CONTACT GUARD ASSIST
DISTANCE (FEET): 250
DEVIATION: DECREASED BASE OF SUPPORT;BRADYKINETIC;DECREASED HEEL STRIKE
ASSISTIVE DEVICE: SINGLE POINT CANE

## 2021-10-20 ASSESSMENT — PATIENT HEALTH QUESTIONNAIRE - PHQ9
SUM OF ALL RESPONSES TO PHQ9 QUESTIONS 1 AND 2: 0
1. LITTLE INTEREST OR PLEASURE IN DOING THINGS: NOT AT ALL
2. FEELING DOWN, DEPRESSED, IRRITABLE, OR HOPELESS: NOT AT ALL

## 2021-10-20 NOTE — THERAPY
Physical Therapy   Daily Treatment     Patient Name: Alexey Caballero  Age:  76 y.o., Sex:  male  Medical Record #: [id number]  Today's Date: 10/20/2021     Precautions  Precautions: Fall Risk  Comments: right omayra.   arm length discrepency     Subjective    Patient agrees to session, his son is present and contradicts patient report that the fWW will not fit at the house.      Objective       10/20/21 1401   Cognition    Comments impulsive at times with SPC.    Gait Functional Level of Assist    Gait Level Of Assist Contact Guard Assist   Assistive Device Single Point Cane   Distance (Feet) 200  (outdoors, 250 x 3 indoors with SPC)   # of Times Distance was Traveled 1   Deviation Decreased Base Of Support;Bradykinetic;Decreased Heel Strike  (rigid trunk and RUE. )   Stairs Functional Level of Assist   Level of Assist with Stairs Contact Guard Assist   # of Stairs Climbed 12  (4 inch steps 1 rail and SPC, 6 inch step x 8 1 rail and SPC)   Stairs Description Extra time;Hand rails;Safety concerns;Supervision for safety;Verbal cueing  (cues for SPC and leading foot sequence. )   Transfer Functional Level of Assist   Bed, Chair, Wheelchair Transfer Supervised   Bed Chair Wheelchair Transfer Description Adaptive equipment;Verbal cueing;Supervision for safety   Bed Mobility    Sit to Stand Supervised   Interdisciplinary Plan of Care Collaboration   IDT Collaboration with  Family / Caregiver   Patient Position at End of Therapy Seated;Call Light within Reach;Tray Table within Reach;Family / Friend in Room   Collaboration Comments Patients son present for session.    PT Total Time Spent   PT Individual Total Time Spent (Mins) 60   PT Charge Group   PT Gait Training 2   PT Therapeutic Activities 2   Edu to patients son on gait belt use, guarding techniques and stairs completion and safety.  To be behind pt going up steps, below coming down, with split stance for safety on steps.      Curb navigation with SPC x 1 up/down  with son guarding and HHA.      STS from low bench outdoors pt requires min A for steadying after twisting his foot and not using arm rests.  Otherwise SBA.  Also catches SPC on rug at doorway x 1.      Lengthy discussion with son about home supervision, life alert recommendations, gait recommendations with SPC vs FWW at home. Advising 24/7 care, FWW for mobility.       Assessment    Patient does well with long distance gait, outdoors and stair completion. Requires only min A x 1 from lower outside bench.  Patients son is receptive to training and shows concern and effort to make sure pt will stay safe upon returning home.  Son very hyper verbose needing redirection throughout tx session.        Plan    dynamic balance, reinforce use of call light, safety, transfer training, Mod I in room?? Stairs.room modification.      Passport items to be completed:  Get in/out of bed safely, in/out of a vehicle, safely use mobility device, walk or wheel around home/community, navigate up and down stairs, show how to get up/down from the ground, ensure home is accessible, demonstrate HEP, complete caregiver training    Physical Therapy Problems (Active)     Problem: Mobility     Dates: Start: 10/17/21       Goal: STG-Within one week, patient will ambulate household distance     Dates: Start: 10/17/21       Description: Patient will ambulate with single point cane 150ft level surface stand by assitance        Goal: STG-Within one week, patient will ambulate up/down a curb     Dates: Start: 10/17/21       Description: Patient will ascend and descend 14 steps with single hand rails min A           Problem: Mobility Transfers     Dates: Start: 10/17/21       Goal: STG-Within one week, patient will transfer bed to chair     Dates: Start: 10/17/21       Description: Patient will transfer to various surfaces with the use of single point cane SBA           Problem: PT-Long Term Goals     Dates: Start: 10/17/21       Goal: LTG-By  discharge, patient will maintain balance     Dates: Start: 10/17/21       Description: Patient will demonstrate improved balance evidenced by KNOWLES score improvement of 8 points or greater       Goal: LTG-By discharge, patient will ambulate     Dates: Start: 10/17/21       Description: Pt will ambulate with no device level and uneven surface > 150 ft independent        Goal: LTG-By discharge, patient will transfer one surface to another     Dates: Start: 10/17/21       Description: Patient will transfer to various surfaces no device independent        Goal: LTG-By discharge, patient will ambulate up/down flight of stairs     Dates: Start: 10/17/21       Description: Patient will ascends and descend 14 steps with single hands rail simulating home environment independent

## 2021-10-20 NOTE — PROGRESS NOTES
"Rehab Progress Note     Encounter Date: 10/20/2021    CC: CVA, right sided ataxia    Interval Events (Subjective)  Patient sitting up in therapy gym. He reports he is doing well. SLP signed off as he is doing so well. Discussed would have IDT tomorrow.  Denies NVD, does have heart burn.     Objective:  VITAL SIGNS: /84   Pulse 61   Temp 36.4 °C (97.5 °F) (Tympanic)   Resp 18   Ht 1.727 m (5' 8\")   Wt 91.6 kg (201 lb 15.1 oz)   SpO2 95%   BMI 30.71 kg/m²   Gen: NAD  Psych: Mood and affect appropriate  CV: RRR, no edema  Resp: CTAB, no upper airway sounds  Abd: NTND  Neuro: AOx4, following commands    No results found for this or any previous visit (from the past 72 hour(s)).    Current Facility-Administered Medications   Medication Frequency   • losartan (COZAAR) tablet 50 mg DAILY   • vitamin D3 (cholecalciferol) tablet 1,000 Units DAILY   • Respiratory Therapy Consult Continuous RT   • hydrALAZINE (APRESOLINE) tablet 25 mg Q8HRS PRN   • acetaminophen (Tylenol) tablet 650 mg Q4HRS PRN   • senna-docusate (PERICOLACE or SENOKOT S) 8.6-50 MG per tablet 2 Tablet BID    And   • polyethylene glycol/lytes (MIRALAX) PACKET 1 Packet QDAY PRN    And   • magnesium hydroxide (MILK OF MAGNESIA) suspension 30 mL QDAY PRN    And   • bisacodyl (DULCOLAX) suppository 10 mg QDAY PRN   • artificial tears ophthalmic solution 1 Drop PRN   • benzocaine-menthol (CEPACOL) lozenge 1 Lozenge Q2HRS PRN   • mag hydrox-al hydrox-simeth (MAALOX PLUS ES or MYLANTA DS) suspension 20 mL Q2HRS PRN   • ondansetron (ZOFRAN ODT) dispertab 4 mg 4X/DAY PRN    Or   • ondansetron (ZOFRAN) syringe/vial injection 4 mg 4X/DAY PRN   • traZODone (DESYREL) tablet 50 mg QHS PRN   • sodium chloride (OCEAN) 0.65 % nasal spray 2 Spray PRN   • oxyCODONE immediate-release (ROXICODONE) tablet 2.5 mg Q3HRS PRN    Or   • oxyCODONE immediate-release (ROXICODONE) tablet 5 mg Q3HRS PRN   • traMADol (ULTRAM) 50 MG tablet 50 mg Q4HRS PRN   • aspirin (ASA) " chewable tab 81 mg DAILY   • atorvastatin (LIPITOR) tablet 80 mg Q EVENING   • enoxaparin (LOVENOX) inj 40 mg DAILY   • therapeutic multivitamin-minerals (THERAGRAN-M) tablet 1 Tablet DAILY       Orders Placed This Encounter   Procedures   • Diet Order Diet: Level 7 - Easy to Chew (no raw onions per pt preference); Liquid level: Level 0 - Thin     Standing Status:   Standing     Number of Occurrences:   1     Order Specific Question:   Diet:     Answer:   Level 7 - Easy to Chew [22]     Comments:   no raw onions per pt preference     Order Specific Question:   Liquid level     Answer:   Level 0 - Thin       Assessment:  Active Hospital Problems    Diagnosis    • *Ischemic stroke (HCC)    • Bilateral carotid artery stenosis    • Coronary artery disease involving native coronary artery of native heart without angina pectoris    • Essential hypertension        Medical Decision Making and Plan:  Left posterior limb internal capsule CVA  - greatest deficits are R sided weakness, impaired coordination and dysphagia   - continue with secondary stroke ppx of ASA and statin   -Continue with comprehensive IRF therapy program with 3hrs of therapy 5 days per week with PT/OT/SLP to improve strength, endurance, balance and stability      Dysphagia  - monitor for signs/symtomps of aspiration  - SLP for asphasia and dysphagia   - currently on easy to chew dysphagia diet with thins      HTN   - monitor for hypotension when up with therapy   - continue on Losartan 25mg qday   -Needs good control, elevated. Losartan was not started on admission. Will start. Increase to 50 mg     Carotid Artery stenossis   -  CTA neck showed 50-60 % stenosis of the L ICA with irregularity and soft/calcified plaque  - continue with ASA and statin      Bowel  - constipation prevention with senna and colace  - miralax prn      Bladder  - monitor for urinary retention and signs/symptpoms of UTI  - begin void trials, initiate PVR, cath if retaining > 200  -  bladder scan if no void > 6hrs      Obesity due to excess calories  -BMI of 30.7 on admission, meets medical criteria  -Dietitian to consult.     Vitamin D deficiency  - 21 on admission. Start 1000 U     GERD - Prilosec not working for him. Discontinue PPI. Add tums PRN    DVT ppx: lovenox     Total time:  26 minutes.  I spent greater than 50% of the time for patient care, counseling, and coordination on this date, including unit/floor time, and face-to-face time with the patient as per interval events and assessment and plan above. Topics discussed included improving right hand function, heart burn, discontinue PPI and switch to PRN tums.     Temi Travis M.D.

## 2021-10-20 NOTE — THERAPY
"Occupational Therapy  Daily Treatment     Patient Name: Alexey Caballero  Age:  76 y.o., Sex:  male  Medical Record #: [id number]  Today's Date: 10/20/2021     Precautions  Precautions: Fall Risk  Comments: right omayra.   arm length discrepency          Subjective    \"I feel safer with the cane. The walker seems to get in the way.\"     Objective       10/20/21 1001   Vitals   Vitals Comments no s/s of distress   Functional Level of Assist   Toileting Supervision   Toileting Description Grab bar;Increased time;Set-up of equipment;Supervision for safety   Toilet Transfers Supervised  (w/ FWW)   Toilet Transfer Description Grab bar;Set-up of equipment;Supervision for safety;Verbal cueing;Increased time   Tub / Shower Transfers Supervised  (tub/shower with grab bars and shower chair)   Hand Strengthening   Comment R : 60, 55, and 50   L : 50, 50, and 45 (impaired from previous injury). Theraputty HEP   Fine Motor / Dexterity    Fine Motor / Dexterity Interventions Completed 2 mini pegboard /FMC tasks. See pictures below   IADL Treatments   Kitchen Mobility Education With SPC, pt retrieved filled and walked 20 feet with cup of water. Pt able to empty and return cup of water to dish drying rack. Pt able to retrieve items from all shelves in fridge including bottom drawer with supervision. Pt able to retrieve pot and place on stovetop. Pt able to place glass casserole dish in and out of oven. Pt did drop cup but was able to pick it up off the floor. Pt had no LOB.    Interdisciplinary Plan of Care Collaboration   IDT Collaboration with  Physical Therapist;Family / Caregiver;Nursing   Patient Position at End of Therapy Seated;Tray Table within Reach;Phone within Reach;Call Light within Reach;Family / Friend in Room   Collaboration Comments re: CLOF, POC, potential for mod I.   OT Total Time Spent   OT Individual Total Time Spent (Mins) 60   OT Charge Group   OT Self Care / ADL 1   OT Therapy Activity 2   OT " Therapeutic Exercise  1     Theraputty Exercises:   Squeeze putty x10 each hand   Roll putty in to a log  Pinch the log- using alternating fingers and thumb  Divide putty in to 4 pieces; Roll each piece in to ball and flatten each ball  Pull out 10 coins from putty                Assessment    Pt with improvement to FMC, iADL participation, picking items up off the floor safely, and transfers. Pt does seem to have less scissoring with SPC than FWW when participating in functional tasks. No  deficits noted in peg task. Pt would like to be mod I in the room. He does well with toileting tasks, however, rushes when turning with FWW.     Strengths: Able to follow instructions, Alert and oriented, Independent prior level of function, Making steady progress towards goals, Manages pain appropriately, Motivated for self care and independence, Pleasant and cooperative, Supportive family, Willingly participates in therapeutic activities  Barriers: Decreased endurance, Generalized weakness, Hemiparesis, Impaired balance, Impaired activity tolerance    Plan    ADL  IADL , related mobility and transfer at FWW level.  Right UE  Strengthening and FMC    standing balance / endurance       Passport items to be completed:  Perform bathroom transfers, complete dressing, prepare a simple meal, participate in household tasks, adapt home for safety needs, demonstrate home exercise program, complete caregiver training     Occupational Therapy Goals (Active)     Problem: Bathing     Dates: Start: 10/17/21       Goal: STG-Within one week, patient will bathe with distant supervision using DME/AE as needed, no cues for safety     Dates: Start: 10/17/21             Problem: Functional Transfers     Dates: Start: 10/17/21       Goal: STG-Within one week, patient will transfer to toilet with distant supervision using DME/AE as needed, no cues for safety     Dates: Start: 10/17/21             Problem: IADL's     Dates: Start: 10/17/21        Goal: STG-Within one week, patient will access kitchen area at SBA using FWW     Dates: Start: 10/17/21             Problem: OT Long Term Goals     Dates: Start: 10/17/21       Goal: LTG-By discharge, patient will complete basic self care tasks at Mod I using DME/AE as needed with least restrictive device     Dates: Start: 10/17/21          Goal: LTG-By discharge, patient will perform bathroom transfers at Mod I using DME/AE as needed with least restrictive device     Dates: Start: 10/17/21             Problem: Toileting     Dates: Start: 10/17/21       Goal: STG-Within one week, patient will complete toileting tasks with distant supervision using DME/AE as needed, no cues for safety     Dates: Start: 10/17/21

## 2021-10-20 NOTE — THERAPY
"Physical Therapy   Daily Treatment     Patient Name: Alexey Caballero  Age:  76 y.o., Sex:  male  Medical Record #: [id number]  Today's Date: 10/20/2021     Precautions  Precautions: Fall Risk  Comments: right omayra.   arm length discrepency     Subjective    Patient agrees to session, asks \"when can I move around the room on my own?\"     Objective       10/20/21 0831   Gait Functional Level of Assist    Gait Level Of Assist Stand by Assist   Assistive Device Front Wheel Walker;Single Point Cane   Distance (Feet) 250  (w/ FWW x 1, w/ SPC x 1, 220 x 1 with SPC.)   # of Times Distance was Traveled 1   Deviation Decreased Base Of Support;Shuffled Gait;Decreased Heel Strike   Transfer Functional Level of Assist   Bed, Chair, Wheelchair Transfer Supervised   Bed Chair Wheelchair Transfer Description Adaptive equipment;Set-up of equipment;Supervision for safety;Verbal cueing   Toilet Transfers Supervised   Toilet Transfer Description Grab bar;Adaptive equipment;Verbal cueing;Supervision for safety   Bed Mobility    Supine to Sit Supervised   Sit to Supine Supervised   Sit to Stand Supervised   Scooting Supervised   Rolling Supervised   Interdisciplinary Plan of Care Collaboration   IDT Collaboration with  Occupational Therapist   Patient Position at End of Therapy Seated;Call Light within Reach;Tray Table within Reach   Collaboration Comments re: mod I status, CLOF   PT Total Time Spent   PT Individual Total Time Spent (Mins) 60   PT Charge Group   PT Gait Training 1   PT Neuromuscular Re-Education / Balance 2   PT Therapeutic Activities 1   standing dynamic weighted ball tap and holds w/ RLE x 10, then tap and pass ball to front of other foot both directions x 5 each.  Progressed to soccer ball tap and hold on ball coordinating control x 5 each, then progressed to passing ball to front of opposite foot x 5 each direction.  Seated soccer ball passing, stop and hold, and kicking back to PT.      Sidestepping gait x 30 ft B " "w/ SPC, 360 turns x 2 each direction with SPC.     Cues for arm swing with cane use.     Assessment of mod I status at FWW in room bed > toilet > chair completes with SPV at this time due to rushed movements and mild instability with turns.      Assessment    Patient demos difficulty with single leg stance balance activities on RLE, improves with repeated practice. Remains SPV for transfers in room with fWW level.  Continue to assess and clear with room management and safety.         Plan        dynamic balance, reinforce use of call light, safety, transfer training, 18\" wc. Mod I in room?? Stairs.     Passport items to be completed:  Get in/out of bed safely, in/out of a vehicle, safely use mobility device, walk or wheel around home/community, navigate up and down stairs, show how to get up/down from the ground, ensure home is accessible, demonstrate HEP, complete caregiver training    Physical Therapy Problems (Active)     Problem: Mobility     Dates: Start: 10/17/21       Goal: STG-Within one week, patient will ambulate household distance     Dates: Start: 10/17/21       Description: Patient will ambulate with single point cane 150ft level surface stand by assitance        Goal: STG-Within one week, patient will ambulate up/down a curb     Dates: Start: 10/17/21       Description: Patient will ascend and descend 14 steps with single hand rails min A           Problem: Mobility Transfers     Dates: Start: 10/17/21       Goal: STG-Within one week, patient will transfer bed to chair     Dates: Start: 10/17/21       Description: Patient will transfer to various surfaces with the use of single point cane SBA           Problem: PT-Long Term Goals     Dates: Start: 10/17/21       Goal: LTG-By discharge, patient will maintain balance     Dates: Start: 10/17/21       Description: Patient will demonstrate improved balance evidenced by KNOWLES score improvement of 8 points or greater       Goal: LTG-By discharge, patient will " ambulate     Dates: Start: 10/17/21       Description: Pt will ambulate with no device level and uneven surface > 150 ft independent        Goal: LTG-By discharge, patient will transfer one surface to another     Dates: Start: 10/17/21       Description: Patient will transfer to various surfaces no device independent        Goal: LTG-By discharge, patient will ambulate up/down flight of stairs     Dates: Start: 10/17/21       Description: Patient will ascends and descend 14 steps with single hands rail simulating home environment independent

## 2021-10-21 VITALS
RESPIRATION RATE: 18 BRPM | TEMPERATURE: 97.5 F | SYSTOLIC BLOOD PRESSURE: 154 MMHG | OXYGEN SATURATION: 94 % | WEIGHT: 201.94 LBS | HEART RATE: 65 BPM | HEIGHT: 68 IN | BODY MASS INDEX: 30.61 KG/M2 | DIASTOLIC BLOOD PRESSURE: 94 MMHG

## 2021-10-21 PROCEDURE — 700111 HCHG RX REV CODE 636 W/ 250 OVERRIDE (IP): Performed by: PHYSICAL MEDICINE & REHABILITATION

## 2021-10-21 PROCEDURE — 99233 SBSQ HOSP IP/OBS HIGH 50: CPT | Performed by: PHYSICAL MEDICINE & REHABILITATION

## 2021-10-21 PROCEDURE — A9270 NON-COVERED ITEM OR SERVICE: HCPCS | Performed by: PHYSICAL MEDICINE & REHABILITATION

## 2021-10-21 PROCEDURE — 97530 THERAPEUTIC ACTIVITIES: CPT

## 2021-10-21 PROCEDURE — 700102 HCHG RX REV CODE 250 W/ 637 OVERRIDE(OP): Performed by: PHYSICAL MEDICINE & REHABILITATION

## 2021-10-21 RX ORDER — LOSARTAN POTASSIUM 50 MG/1
50 TABLET ORAL DAILY
Qty: 90 TABLET | Refills: 2 | Status: SHIPPED | OUTPATIENT
Start: 2021-10-21 | End: 2022-07-21 | Stop reason: SDUPTHER

## 2021-10-21 RX ORDER — ATORVASTATIN CALCIUM 80 MG/1
80 TABLET, FILM COATED ORAL EVERY EVENING
Qty: 90 TABLET | Refills: 2 | Status: SHIPPED | OUTPATIENT
Start: 2021-10-21 | End: 2022-07-21 | Stop reason: SDUPTHER

## 2021-10-21 RX ORDER — ASPIRIN 81 MG/1
81 TABLET, CHEWABLE ORAL DAILY
Qty: 100 TABLET | Refills: 2 | Status: SHIPPED | OUTPATIENT
Start: 2021-10-21

## 2021-10-21 RX ADMIN — LOSARTAN POTASSIUM 50 MG: 25 TABLET, FILM COATED ORAL at 07:37

## 2021-10-21 RX ADMIN — MULTIPLE VITAMINS W/ MINERALS TAB 1 TABLET: TAB at 07:37

## 2021-10-21 RX ADMIN — Medication 1000 UNITS: at 07:37

## 2021-10-21 RX ADMIN — ASPIRIN 81 MG CHEWABLE TABLET 81 MG: 81 TABLET CHEWABLE at 07:37

## 2021-10-21 RX ADMIN — ENOXAPARIN SODIUM 40 MG: 40 INJECTION SUBCUTANEOUS at 07:37

## 2021-10-21 ASSESSMENT — BALANCE ASSESSMENTS
SITTING TO STANDING: 4
PLACE ALTERNATE FOOT ON STEP OR STOOL WHILE STANDING UNSUPPORTED: 3
TURN 360 DEGREES: 2
STANDING UNSUPPORTED WITH FEET TOGETHER: 4
STANDING TO SITTING: 4
TRANSFERS: 4
PICK UP OBJECT FROM THE FLOOR FROM A STANDING POSITION: 3
REACHING FORWARD WITH OUTSTRETCHED ARM WHILE STANDING: 3
STANDING UNSUPPORTED: 4
LONG VERSION TOTAL SCORE (MAX 56): 47
LONG VERSION TOTAL SCORE (MAX 56): 47
STANDING ON ONE LEG: 1
STANDING UNSUPPORTED ONE FOOT IN FRONT: 3
STANDING UNSUPPORTED WITH EYES CLOSED: 4
SITTING UNSUPPORTED: 4
LOOK OVER LEFT AND RIGHT SHOULDERS WHILE STANDING: 4

## 2021-10-21 ASSESSMENT — ACTIVITIES OF DAILY LIVING (ADL)
TOILET_TRANSFER_LEVEL_OF_ASSIST: ABLE TO COMPLETE TOILET TRANSFER WITHOUT ASSIST
BED_CHAIR_WHEELCHAIR_TRANSFER_DESCRIPTION: ADAPTIVE EQUIPMENT;INCREASED TIME
TOILETING_LEVEL_OF_ASSIST: ABLE TO COMPLETE TOILETING WITHOUT ASSIST
SHOWER_TRANSFER_LEVEL_OF_ASSIST: REQUIRES SUPERVISION WITH SHOWER TRANSFER

## 2021-10-21 ASSESSMENT — GAIT ASSESSMENTS
DISTANCE (FEET): 250
DEVIATION: DECREASED BASE OF SUPPORT;BRADYKINETIC;DECREASED HEEL STRIKE
ASSISTIVE DEVICE: SINGLE POINT CANE
GAIT LEVEL OF ASSIST: SUPERVISED

## 2021-10-21 ASSESSMENT — PATIENT HEALTH QUESTIONNAIRE - PHQ9
2. FEELING DOWN, DEPRESSED, IRRITABLE, OR HOPELESS: NOT AT ALL
SUM OF ALL RESPONSES TO PHQ9 QUESTIONS 1 AND 2: 0
1. LITTLE INTEREST OR PLEASURE IN DOING THINGS: NOT AT ALL

## 2021-10-21 ASSESSMENT — PAIN DESCRIPTION - PAIN TYPE: TYPE: ACUTE PAIN

## 2021-10-21 NOTE — CARE PLAN
Problem: Bathing  Goal: STG-Within one week, patient will bathe with distant supervision using DME/AE as needed, no cues for safety  Outcome: Met     Problem: Toileting  Goal: STG-Within one week, patient will complete toileting tasks with distant supervision using DME/AE as needed, no cues for safety  Outcome: Met     Problem: Functional Transfers  Goal: STG-Within one week, patient will transfer to toilet with distant supervision using DME/AE as needed, no cues for safety  Outcome: Met     Problem: IADL's  Goal: STG-Within one week, patient will access kitchen area at SBA using FWW  Outcome: Met     Problem: OT Long Term Goals  Goal: LTG-By discharge, patient will complete basic self care tasks at Mod I using DME/AE as needed with least restrictive device  Outcome: Met  Goal: LTG-By discharge, patient will perform bathroom transfers at Mod I using DME/AE as needed with least restrictive device  Outcome: Met

## 2021-10-21 NOTE — CARE PLAN
Problem: Infection  Goal: Patient will remain free from infection  Outcome: Progressing  Note: Patient remains free from s/s infection; afebrile.  Will continue to monitor.      Problem: Fall Risk - Rehab  Goal: Patient will remain free from falls  Outcome: Progressing  Note: Mariah Heath Fall risk Assessment Score: 8    Low fall risk interventions   - Call light within reach   - Yellow  socks   - Belongings within reach   - Bed in the lowest position

## 2021-10-21 NOTE — THERAPY
Occupational Therapy  Daily Treatment     Patient Name: Alexey Caballero  Age:  76 y.o., Sex:  male  Medical Record #: [id number]  Today's Date: 10/21/2021     Precautions  Precautions: Fall Risk  Comments: right omayra.   arm length discrepency          Subjective    Pt sitting up in chair upon arrival, agreeable to OT session.      Objective     10/21/21 0831   Fine Motor / Dexterity    Fine Motor / Dexterity Yes   Fine Motor / Dexterity Interventions Jenga and clothing fasteners/lace tying activity to focus on [place] [facility]    Interdisciplinary Plan of Care Collaboration   Patient Position at End of Therapy Seated;Call Light within Reach;Tray Table within Reach;Phone within Reach   OT Total Time Spent   OT Individual Total Time Spent (Mins) 30   OT Charge Group   OT Therapy Activity 2     Pt had minor LOB by tripping over chair corner when attempting to turn and sit down. Pt was able to self-correct with close SBA.     Assessment    Pt demo'd good FMC and was able to isolate jenga blocks to engage in activity and tie laces in 2 different styles.   Strengths: Able to follow instructions, Alert and oriented, Independent prior level of function, Making steady progress towards goals, Manages pain appropriately, Motivated for self care and independence, Pleasant and cooperative, Supportive family, Willingly participates in therapeutic activities  Barriers: Decreased endurance, Generalized weakness, Hemiparesis, Impaired balance, Impaired activity tolerance    Plan    ADL  IADL , related mobility and transfer at FWW level.  Right UE  Strengthening and [facility]    standing balance / endurance      Passport items to be completed:  Perform bathroom transfers, complete dressing, complete feeding, get ready for the day, prepare a simple meal, participate in household tasks, adapt home for safety needs, demonstrate home exercise program, complete caregiver training     Occupational Therapy Goals (Active)     There are no active  problems.

## 2021-10-21 NOTE — PROGRESS NOTES
"Rehab Progress Note     Encounter Date: 10/21/2021    CC: CVA, right sided ataxia    Interval Events (Subjective)  Patient sitting up in room. He reports therapy went well. He thinks he may be Hussein this afternoon. He reports he is a little sore. His handwriting this morning appears even better.  Discussed would have IDT later today.      IDT Team Meeting 10/21/2021    ITemi M.D., was present and led the interdisciplinary team conference on 10/21/2021.  I led the IDT conference and agree with the IDT conference documentation and plan of care as noted below.     RN:  Diet Regular   % Meal     Pain    Sleep    Bowel Continent   Bladder Continent   In's & Out's      PT:  Bed Mobility    Transfers Hussein   Mobility Hussein   Stairs      OT:  Eating    Grooming Hussein   Bathing supervs   UB Dressing    LB Dressing Hussein   Toileting    Shower & Transfer Hussein   Could discharge today    CM:  Continues to work on disposition and DME needs.      DC/Disposition:  10/21/21      Objective:  VITAL SIGNS: /94   Pulse 65   Temp 36.4 °C (97.5 °F) (Oral)   Resp 18   Ht 1.727 m (5' 8\")   Wt 91.6 kg (201 lb 15.1 oz)   SpO2 94%   BMI 30.71 kg/m²   Gen: NAD  Psych: Mood and affect appropriate  CV: RRR, no edema  Resp: CTAB, no upper airway sounds  Abd: NTND  Neuro: AOx4, following commands  Unchanged from 10/21/21 including writing very legible     No results found for this or any previous visit (from the past 72 hour(s)).    Current Facility-Administered Medications   Medication Frequency   • calcium carbonate (TUMS) chewable tab 500 mg 4X/DAY PRN   • losartan (COZAAR) tablet 50 mg DAILY   • vitamin D3 (cholecalciferol) tablet 1,000 Units DAILY   • Respiratory Therapy Consult Continuous RT   • hydrALAZINE (APRESOLINE) tablet 25 mg Q8HRS PRN   • acetaminophen (Tylenol) tablet 650 mg Q4HRS PRN   • senna-docusate (PERICOLACE or SENOKOT S) 8.6-50 MG per tablet 2 Tablet BID    And   • polyethylene glycol/lytes " (MIRALAX) PACKET 1 Packet QDAY PRN    And   • magnesium hydroxide (MILK OF MAGNESIA) suspension 30 mL QDAY PRN    And   • bisacodyl (DULCOLAX) suppository 10 mg QDAY PRN   • artificial tears ophthalmic solution 1 Drop PRN   • benzocaine-menthol (CEPACOL) lozenge 1 Lozenge Q2HRS PRN   • mag hydrox-al hydrox-simeth (MAALOX PLUS ES or MYLANTA DS) suspension 20 mL Q2HRS PRN   • ondansetron (ZOFRAN ODT) dispertab 4 mg 4X/DAY PRN    Or   • ondansetron (ZOFRAN) syringe/vial injection 4 mg 4X/DAY PRN   • traZODone (DESYREL) tablet 50 mg QHS PRN   • sodium chloride (OCEAN) 0.65 % nasal spray 2 Spray PRN   • oxyCODONE immediate-release (ROXICODONE) tablet 2.5 mg Q3HRS PRN    Or   • oxyCODONE immediate-release (ROXICODONE) tablet 5 mg Q3HRS PRN   • traMADol (ULTRAM) 50 MG tablet 50 mg Q4HRS PRN   • aspirin (ASA) chewable tab 81 mg DAILY   • atorvastatin (LIPITOR) tablet 80 mg Q EVENING   • enoxaparin (LOVENOX) inj 40 mg DAILY   • therapeutic multivitamin-minerals (THERAGRAN-M) tablet 1 Tablet DAILY       Orders Placed This Encounter   Procedures   • Diet Order Diet: Level 7 - Easy to Chew (no raw onions per pt preference); Liquid level: Level 0 - Thin     Standing Status:   Standing     Number of Occurrences:   1     Order Specific Question:   Diet:     Answer:   Level 7 - Easy to Chew [22]     Comments:   no raw onions per pt preference     Order Specific Question:   Liquid level     Answer:   Level 0 - Thin       Assessment:  Active Hospital Problems    Diagnosis    • *Ischemic stroke (HCC)    • Bilateral carotid artery stenosis    • Coronary artery disease involving native coronary artery of native heart without angina pectoris    • Essential hypertension        Medical Decision Making and Plan:  Left posterior limb internal capsule CVA  - greatest deficits are R sided weakness, impaired coordination and dysphagia   - continue with secondary stroke ppx of ASA and statin   -Continue with comprehensive IRF therapy program  with 3hrs of therapy 5 days per week with PT/OT/SLP to improve strength, endurance, balance and stability      Dysphagia  - monitor for signs/symtomps of aspiration  - SLP for asphasia and dysphagia  - Cleared for regular     HTN   - monitor for hypotension when up with therapy   - continue on Losartan 25mg qday   -Needs good control, elevated. Losartan was not started on admission. Will start. Increase to 50 mg. Follow-up PCP     Carotid Artery stenossis   -  CTA neck showed 50-60 % stenosis of the L ICA with irregularity and soft/calcified plaque  - continue with ASA and statin      Bowel  - constipation prevention with senna and colace  - miralax prn      Bladder  - monitor for urinary retention and signs/symptpoms of UTI  - begin void trials, initiate PVR, cath if retaining > 200  - bladder scan if no void > 6hrs      Obesity due to excess calories  -BMI of 30.7 on admission, meets medical criteria  -Dietitian to consult.     Vitamin D deficiency  - 21 on admission. Start 1000 U     GERD - Prilosec not working for him. Discontinue PPI. Add tums PRN    DVT ppx: lovenox. Ambulating > 150 feet, discontinue Lovenox    Total time:  36 minutes.  I spent greater than 50% of the time for patient care, counseling, and coordination on this date, including unit/floor time, and face-to-face time with the patient as per interval events and assessment and plan above. Topics discussed included discharge planning, improved SBP, follow-up PCP, and discontinue Lovenox. Patient was discussed separately in IDT today; please see details above.    Temi Travis M.D.

## 2021-10-21 NOTE — CARE PLAN
Problem: Mobility  Goal: STG-Within one week, patient will ambulate up/down a curb  Description: Patient will ascend and descend 14 steps with single hand rails min A   Outcome: Progressing  Note: 12 with CGA and 2 handrails.       Problem: Mobility  Goal: STG-Within one week, patient will ambulate household distance  Description: Patient will ambulate with single point cane 150ft level surface stand by assitance   Outcome: Met     Problem: Mobility Transfers  Goal: STG-Within one week, patient will transfer bed to chair  Description: Patient will transfer to various surfaces with the use of single point cane SBA   Outcome: Met

## 2021-10-21 NOTE — PROGRESS NOTES
Patient discharged to home per order.  Discharge instructions reviewed with patient and son; they verbalize understanding and signed copies placed in chart.  Patient has all belongings; signed copy of form in chart.  Patient left facility at approximately 1430 via walker accompanied by rehab staff and son.  Have enjoyed working with this pleasant patient.

## 2021-10-21 NOTE — DISCHARGE PLANNING
CM  As indicated by MD, pt to be discharged home today/later this pm.   Recommendations made for out pt PT/OT.  Pt has all dme at home.      none

## 2021-10-21 NOTE — CARE PLAN
Problem: Bowel Elimination  Goal: Patient will participate in bowel management program  Note: Pt continues to refused scheduled senna at hs.Education given on the importance of bowel medication at each encounter.Pt verbalized understanding.LBM 10/20.Will continue to monitor.     Problem: Fall Risk - Rehab  Goal: Patient will remain free from falls  Note: Mariah Heath Fall risk Assessment Score: 8    Low fall risk interventions   - Call light within reach   - Yellow  socks   - Belongings within reach   - Bed in the lowest position       Appropriately uses call light to make needs known.Will continue to monitor and assess needs and safety.

## 2021-10-21 NOTE — DISCHARGE PLANNING
Spoke with admitting at [facility]. They are accepting the patient and will be calling to set up first appointments.

## 2021-10-21 NOTE — THERAPY
Physical Therapy   Daily Treatment     Patient Name: Alexey Caballero  Age:  76 y.o., Sex:  male  Medical Record #: [id number]  Today's Date: 10/21/2021     Precautions  Precautions: Fall Risk  Comments: right omayra.   arm length discrepency     Subjective    Pt received in chair at bedside, agreeable to PT tx session. Son present at end of session. Pt to discharge today.     Objective       10/21/21 1101   Pain   Intervention Declines   Pain 0 - 10 Group   Pain Rating Scale (NPRS) 0   Cognition    Level of Consciousness Alert   Sleep/Wake Cycle   Sleep & Rest Awake   Gait Functional Level of Assist    Gait Level Of Assist Supervised  (mod I with FWW)   Assistive Device Single Point Cane   Distance (Feet) 250   # of Times Distance was Traveled 2   Deviation Decreased Base Of Support;Bradykinetic;Decreased Heel Strike   Stairs Functional Level of Assist   Level of Assist with Stairs Stand by Assist   # of Stairs Climbed 14   Stairs Description   (R handrail ascending, contralateral SPC)   Transfer Functional Level of Assist   Bed, Chair, Wheelchair Transfer Modified Independent   Bed Chair Wheelchair Transfer Description Adaptive equipment;Increased time   Durant Balance Scale   Sitting Unsupported (Score 0-4) 4   Change Of Positon: Sitting To Standing (Score 0-4) 4   Change Of Positon: Standing To Sitting (Score 0-4) 4   Transfers (Score 0-4) 4   Standing Unsupported (Score 0-4) 4   Standing With Eyes Closed (Score 0-4) 4   Standing With Feet Together (Score 0-4) 4   Tandem Standing (Score 0-4) 3   Standing On One Leg (Score 0-4) 1   Turning Trunk (Feet Fixed) (Score 0-4) 4   Retrieving Objects From Floor (Score 0-4) 3   Turning 360 Degrees (Score 0-4) 2   Stool Stepping (Score 0-4) 3   Reaching Forward While Standing (Score 0-4) 3   Durant Balance Total Score (0-56) 47   Interdisciplinary Plan of Care Collaboration   IDT Collaboration with  Family / Caregiver   Patient Position at End of Therapy Seated;Family / Friend  in Room;Call Light within Reach   Collaboration Comments Son present. Answered any questions in preparation for discharge today.   PT Total Time Spent   PT Individual Total Time Spent (Mins) 30   PT Charge Group   PT Therapeutic Activities 2       Assessment    Pt completed final IRF-CAROL tasks and Knowles balance scale in preparation for discharge home today. Pt is at a low risk for falls. Patient passport to independence also reviewed with the patient and son.        Plan    DC home      Physical Therapy Problems (Active)     Problem: Mobility     Dates: Start: 10/17/21       Goal: STG-Within one week, patient will ambulate up/down a curb     Dates: Start: 10/17/21       Description: Patient will ascend and descend 14 steps with single hand rails min A     Goal Note filed on 10/21/21 0820 by Jamal Cano, PT     12 with CGA and 2 handrails.                 Problem: PT-Long Term Goals     Dates: Start: 10/17/21       Goal: LTG-By discharge, patient will maintain balance     Dates: Start: 10/17/21       Description: Patient will demonstrate improved balance evidenced by KNOWLES score improvement of 8 points or greater       Goal: LTG-By discharge, patient will ambulate     Dates: Start: 10/17/21       Description: Pt will ambulate with no device level and uneven surface > 150 ft independent        Goal: LTG-By discharge, patient will transfer one surface to another     Dates: Start: 10/17/21       Description: Patient will transfer to various surfaces no device independent        Goal: LTG-By discharge, patient will ambulate up/down flight of stairs     Dates: Start: 10/17/21       Description: Patient will ascends and descend 14 steps with single hands rail simulating home environment independent

## 2021-10-21 NOTE — DISCHARGE INSTRUCTIONS
[facility] NURSING DISCHARGE INSTRUCTIONS    Blood Pressure : 154/94  Weight: 91.6 kg (201 lb 15.1 oz)  Nursing recommendations for Alexey Caballero at time of discharge are as follows:  Client verbalized understanding of all discharge instructions and prescriptions.     Review all your home medications and newly ordered medications with your doctor and/or pharmacist. Follow medication instructions as directed by your doctor and/or pharmacist.    Pain Management:   Discharge Pain Medication Instructions:  Comfort Goal: Comfort with Movement, Sleep Comfortably  Notify your primary care provider if pain is unrelieved with these measures, if the pain is new, or increased in intensity.    Discharge Skin Characteristics:    Discharge Skin Exam:       Skin / Wound Care Instructions: Please contact your primary care physician for any change in skin integrity.     If You Have Surgical Incisions / Wounds:  Monitor surgical site(s) for signs of increased swelling, redness or symptoms of drainage from the site or fever as this could indicate signs and symptoms of infection. If these symptoms are noted, notifiy your primary care provider.      Discharge Safety Instructions:       Discharge Safety Concerns:    The interdisciplinary team has made recommendation that you do not require supervision in the house due to weakness  Anti-embolic stockings are not required to increase circulation to the lower extremities.    Discharge Diet:     Easy to chew foods   Discharge Liquids:  Thin   Discharge Bowel Function:  Continent   Please contact your primary care physician for any changes in bowel habits.  Discharge Bowel Program:    Discharge Bladder Function:    Discharge Urinary Devices:        Nursing Discharge Plan:   Understanding Your Risk for Falls  Each year, millions of people suffer serious injuries from falls. It is important to understand your risk for falling. Talk with your health care provider  about your risk and what you can do to lower it. There are actions you can take at home to lower your risk.  If you do have a serious fall, it is important to tell your health care provider. Falling once raises your risk for falling again.  How can falls affect me?  Serious injuries from falls are common. These include:  · Broken bones. Most hip fractures are caused by falls.  · Traumatic brain injury (TBI). Falls are the most common cause of TBI.  Fear of falling can also cause you to avoid activities and stay at home. This can make your muscles weaker and actually raise your risk for a fall.  What can increase my risk?  Serious injuries from a fall most often happen to people older than age 65. Children and young adults ages 15-29 are also at higher risk. The more risk factors you have for falling, the higher your risk. Risk factors include:  · Weakness in the lower body.  · Lack (deficiency) of vitamin D.  · Weak bones (osteoporosis).  · Being generally weak or confused due to long-term (chronic) illness.  · Dizziness or balance problems.  · Poor vision.  · Having depression.  · Medicine that causes dizziness or drowsiness. These can include medicines for your blood pressure, heart, anxiety, insomnia, or edema, as well as pain medicines and muscle relaxants.  · Drinking alcohol.  · Foot pain or improper footwear.  · Working at a dangerous job.  · Having had a fall in the past.  · Tripping hazards at home, such as floor clutter or loose rugs, or poor lighting.  · Having pets or clutter in your home.  What actions can I take to lower my risk of falling?         · Maintain physical fitness:  ? Do strength and balance exercises. Consider taking a regular class to build strength and balance. Yoga and brendon chi are good options.  ? Have your eyes checked every year and your vision prescription updated as needed.  · Remove all clutter from walkways and stairways, including extension cords.  · Use a cordless phone.  · Do  not use throw rugs. Make sure all carpeting is taped or tacked down securely.  · Use good lighting in all rooms. Keep a flashlight near your bed.  · Make sure there is a clear path from your bed to the bathroom. Use night-lights.  · Install grab bars for your tub, shower, and toilet. Use a bath mat in your tub or shower.  · Attach secure railings on both sides of your stairs.  · Repair uneven or broken steps.  · Use a cane or walker as directed by your health care provider.  · Wear nonskid shoes. Do not wear high heels. Do not walk around the house in socks or slippers.  · Avoid walking on icy or slippery surfaces. Walk on the grass instead of on icy or slick sidewalks. Where you can, use ice melt to get rid of ice on walkways.  Questions to ask your health care provider  · Can you help me evaluate my risk for a fall?  · Do any of my medicines make me more likely to fall?  · Should I take a vitamin D supplement?  · What exercises can I do to improve my strength and balance?  · Should I make an appointment to have my vision checked?  · Do I need a bone density test to check for osteoporosis?  · Would it help to use a cane or a walker?  Where to find more information  · Centers for Disease Control and Prevention, STEADI: cdc.gov  · Community-Based Fall Prevention Programs: cdc.gov  · National [place] on Aging: kb6vdut.jatinder.nih.gov  Contact a health care provider if:  · You fall at home.  · You are afraid of falling at home.  · You feel weak, drowsy, or dizzy at home.  Summary  · People 65 and older are at high risk for falling. However, older people are not the only ones injured in falls. Children and young adults have a higher-than-normal risk, too.  · Talk with your health care provider about your risks for falling and how to lower those risks.  · Taking certain precautions at home can lower your risk for falling.  · If you fall, always tell your health care provider.  This information is not intended to replace  advice given to you by your health care provider. Make sure you discuss any questions you have with your health care provider.  Document Released: 08/01/2018 Document Revised: 03/19/2019 Document Reviewed: 08/01/2018  Elsevier Patient Education © 2020 [employer]vier [employer]      Depression / Suicide Risk    As you are discharged from this [facility] facility, it is important to learn how to keep safe from harming yourself.    Recognize the warning signs:  · Abrupt changes in personality, positive or negative- including increase in energy   · Giving away possessions  · Change in eating patterns- significant weight changes-  positive or negative  · Change in sleeping patterns- unable to sleep or sleeping all the time   · Unwillingness or inability to communicate  · Depression  · Unusual sadness, discouragement and loneliness  · Talk of wanting to die  · Neglect of personal appearance   · Rebelliousness- reckless behavior  · Withdrawal from people/activities they love  · Confusion- inability to concentrate     If you or a loved one observes any of these behaviors or has concerns about self-harm, here's what you can do:  · Talk about it- your feelings and reasons for harming yourself  · Remove any means that you might use to hurt yourself (examples: pills, rope, extension cords, firearm)  · Get professional help from the community (Mental Health, Substance Abuse, psychological counseling)  · Do not be alone:Call your Safe Contact- someone whom you trust who will be there for you.  · Call your local CRISIS HOTLINE 836-8522 or 159-872-3281  · Call your local Children's Mobile Crisis Response Team [place] (592) 904-7567 or www.Qraved  · Call the toll free National Suicide Prevention Hotlines   · National Suicide Prevention Lifeline 733-870-WAZC (1893)  · National Hope Line Network 800-SUICIDE (356-2716)                     Case Management Discharge Instructions:   Discharge Location:    Agency Name/Address/Phone:     Home Health:    Outpatient Services:    DME Provider/Phone:    Medical Equipment Ordered:    Prescription Faxed to:        Discharge Medication Instructions:  Below are the medications your physician expects you to take upon discharge:
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
## 2021-10-27 ENCOUNTER — OCCUPATIONAL THERAPY (OUTPATIENT)
Dept: OCCUPATIONAL THERAPY | Facility: REHABILITATION | Age: 76
End: 2021-10-27
Attending: PHYSICAL MEDICINE & REHABILITATION
Payer: MEDICARE

## 2021-10-27 DIAGNOSIS — I63.9 CEREBRAL INFARCTION, UNSPECIFIED MECHANISM (HCC): ICD-10-CM

## 2021-10-27 PROCEDURE — 97166 OT EVAL MOD COMPLEX 45 MIN: CPT

## 2021-10-27 PROCEDURE — 97110 THERAPEUTIC EXERCISES: CPT

## 2021-10-27 SDOH — ECONOMIC STABILITY: GENERAL: QUALITY OF LIFE: GOOD

## 2021-10-27 ASSESSMENT — ENCOUNTER SYMPTOMS: PAIN SCALE: 0

## 2021-10-27 ASSESSMENT — ACTIVITIES OF DAILY LIVING (ADL): POOR_BALANCE: 1
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
## 2021-10-29 ENCOUNTER — OCCUPATIONAL THERAPY (OUTPATIENT)
Dept: OCCUPATIONAL THERAPY | Facility: REHABILITATION | Age: 76
End: 2021-10-29
Attending: PHYSICAL MEDICINE & REHABILITATION
Payer: MEDICARE

## 2021-10-29 DIAGNOSIS — I63.9 CEREBRAL INFARCTION, UNSPECIFIED MECHANISM (HCC): ICD-10-CM

## 2021-10-29 PROCEDURE — 97110 THERAPEUTIC EXERCISES: CPT

## 2021-10-29 NOTE — OP THERAPY DAILY TREATMENT
Outpatient Occupational Therapy  DAILY TREATMENT     [facility] Occupational Therapy [address].  [address]  [place] [address]  Phone:  [phone]  Fax:  [phone]    Date: 10/29/2021    Patient: Alexey Caballero  YOB: 1945  MRN: [id number]     Time Calculation  Start time: 0846  Stop time: 0928 Time Calculation (min): 42 minutes         Chief Complaint: Hand Weakness and Hand Problem    Visit #: 2    SUBJECTIVE:  Patient reports he has a hand exerciser at home he has been using frequently.    OBJECTIVE:  Current objective measures:   Active Range of Motion:   Upper extremity (left):     Wrist flexion: Below functional limits (fused wrist)    Wrist extension: Below functional limits (fused wrist)  Upper extremity (right):     All right upper extremity active range of motion: All within functional limits        Strength:   Upper extremity strength (left):     Shoulder flexion: 4    Shoulder abduction: 4    Elbow flexion: 4    Forearm pronation: 4-    Forearm supination: -4  Upper extremity strength (right):    Shoulder flexion: 4    Shoulder abduction: 4    Elbow flexion: 4    Elbow extension: 4    Forearm pronation: 4    Forearm supination: 4     , Prehension, Pinch:  /Prehension (left):     Strength Left : 26 lbs.    Opposition: Within functional limits  /Prehension (right):     Strength Right : 29 lbs.    Opposition: Within functional limits  Pinch (left):     Pinch Left Tip to Tip: 10 lbs     Pinch Left Three Point: 15 l bs.    Pinch Left Lateral: 19 lbs.  Pinch (right):    Pinch Right Tip to Tip: 14 lbs.    Pinch Right Three Point: 14 lbs.    Pinch Right Lateral: 17 lbs.     Sensation   Upper extremity (right):     Light touch: Intact    Sharp/dull: Intact     Stereognosis: Intact     Proprioception: Intact      Coordination   Coordination comments:   9 hole peg:  Right: 26 seconds   Left: 25 seconds      Grooved peg board:  Right: 2 min 11  secs     Cognition:     Orientation: normal to time, normal to place, normal to person and normal to situation    Short term memory: intact    Attention span: intact    Sequencing: intact    Organization: intact    Problem solving: intact    Judgement and safety awareness: intact        Therapeutic Exercises (CPT 93859):     1. Grooved pegboard , x30    2. Grooved pegboard x 3 at a time, x30    3. [place] finger-thumb translation, x50    4. 10 lb black digi flex, 3 x 10    5. 11 lb resisted clothes pins , 3 x 5 each pinch     6. Velcro roller wrist flex/ext, x10    7. Velcro roller finger flex/ext, x10    8. 3 lb dumbbell wrist flex/ext, 3 x 10      Time-based treatments/modalities:  Therapeutic exercise minutes (CPT 21990): 42 minutes        Pain rating before treatment: 3  Pain rating after treatment: 3    ASSESSMENT:   Response to treatment: Patient participated in right hand strengthening and fine motor coordination exercises on this date. He is progressing with his right hand coordination, however is having pain in his right thumb during these exercises. Patient reports the thumb pain was an issue prior to the CVA and he has learned to live with it. Patient is very motivated to participate in therapy and is compliant with HEP. He would benefit from continued skilled OT services to address RUE neuromuscular recovery related to coordination and strength of dominant hand.    PLAN/RECOMMENDATIONS:   Plan for treatment: therapy treatment to continue next visit.  Planned interventions for next visit: therapeutic exercise (CPT 37769)

## 2021-11-01 ENCOUNTER — OCCUPATIONAL THERAPY (OUTPATIENT)
Dept: OCCUPATIONAL THERAPY | Facility: REHABILITATION | Age: 76
End: 2021-11-01
Attending: PHYSICAL MEDICINE & REHABILITATION
Payer: MEDICARE

## 2021-11-01 DIAGNOSIS — I63.9 CEREBRAL INFARCTION, UNSPECIFIED MECHANISM (HCC): ICD-10-CM

## 2021-11-01 PROCEDURE — 97110 THERAPEUTIC EXERCISES: CPT

## 2021-11-01 NOTE — OP THERAPY DAILY TREATMENT
Outpatient Occupational Therapy  DAILY TREATMENT     [facility] Occupational Therapy [address]  [address]  [place] [address]  Phone:  [phone]  Fax:  [phone]    Date: 11/01/2021    Patient: Alexey Caballero  YOB: 1945  MRN: [id number]     Time Calculation  Start time: 0859  Stop time: 0943 Time Calculation (min): 44 minutes     Chief Complaint: Hand Weakness and Hand Problem    Visit #: 3    SUBJECTIVE:  Patient reports he has been trying to walk everyday and he wants to start using is total gym at home for arm and leg strengthening.     OBJECTIVE:  Current objective measures:   Active Range of Motion:   Upper extremity (left):     Wrist flexion: Below functional limits (fused wrist)    Wrist extension: Below functional limits (fused wrist)  Upper extremity (right):     All right upper extremity active range of motion: All within functional limits     Strength:   Upper extremity strength (left):     Shoulder flexion: 4    Shoulder abduction: 4    Elbow flexion: 4    Forearm pronation: 4-    Forearm supination: -4  Upper extremity strength (right):    Shoulder flexion: 4    Shoulder abduction: 4    Elbow flexion: 4    Elbow extension: 4    Forearm pronation: 4    Forearm supination: 4     , Prehension, Pinch:  /Prehension (left):     Strength Left : 26 lbs.    Opposition: Within functional limits  /Prehension (right):     Strength Right : 51 lbs.    Opposition: Within functional limits  Pinch (left):     Pinch Left Tip to Tip: 10 lbs     Pinch Left Three Point: 15 l bs.    Pinch Left Lateral: 19 lbs.  Pinch (right):    Pinch Right Tip to Tip: 15 lbs.    Pinch Right Three Point: 16 lbs.    Pinch Right Lateral: 17 lbs.     Sensation   Upper extremity (right):     Light touch: Intact    Sharp/dull: Intact     Stereognosis: Intact     Proprioception: Intact      Coordination   Coordination comments:   9 hole peg:  Right: 26 seconds   Left: 25 seconds       Grooved peg board:  Right: 2 min 11 secs     Cognition:     Orientation: normal to time, normal to place, normal to person and normal to situation    Short term memory: intact    Attention span: intact    Sequencing: intact    Organization: intact    Problem solving: intact    Judgement and safety awareness: intact        Therapeutic Exercises (CPT 08367):     1. Grooved pegboard , x30    2. Grooved pegboard x 3 at a time, x30    3. [place] finger-thumb translation, x50    4. 10 lb black digi flex, 3 x 10    5. 11 lb resisted clothes pins , 3 x 5 each pinch     6. Velcro roller wrist flex/ext, x10    7. Velcro roller finger flex/ext, x10    8. 3 lb dumbbell wrist flex/ext, 3 x 10    9. Fine motor board (screwing), x30      Time-based treatments/modalities:  Therapeutic exercise minutes (CPT 21838): 44 minutes        Pain rating before treatment: 0  Pain rating after treatment: 0    ASSESSMENT:   Response to treatment: Patient's right hand  and pinch strength was re-assessed on this date. He has increased his right hand  strength by 22 lbs since last measurement. He additionally has improved his fine motor control and coordination. Patient continues to have pain in right CMC joint during fine motor exercises from previous injury. He was inquiring about CMC arthroplasty surgery. Patient was educated on surgery and recovery for if he chooses to pursue this surgical intervention. Patient would benefit from continued skilled OT services to address right hand strengthening and dexterity.    PLAN/RECOMMENDATIONS:   Plan for treatment: therapy treatment to continue next visit.  Planned interventions for next visit: therapeutic exercise (CPT 97529)

## 2021-11-03 ENCOUNTER — OCCUPATIONAL THERAPY (OUTPATIENT)
Dept: OCCUPATIONAL THERAPY | Facility: REHABILITATION | Age: 76
End: 2021-11-03
Attending: PHYSICAL MEDICINE & REHABILITATION
Payer: MEDICARE

## 2021-11-03 DIAGNOSIS — I63.9 CEREBRAL INFARCTION, UNSPECIFIED MECHANISM (HCC): ICD-10-CM

## 2021-11-03 PROCEDURE — 97110 THERAPEUTIC EXERCISES: CPT

## 2021-11-03 NOTE — OP THERAPY DAILY TREATMENT
Outpatient Occupational Therapy  DAILY TREATMENT     [facility] Occupational [address].  [address]  [place] [address]  Phone:  [phone]  Fax:  [phone]    Date: 11/03/2021    Patient: Alexey Caballero  YOB: 1945  MRN: [id number]     Time Calculation  Start time: 1329  Stop time: 1414 Time Calculation (min): 45 minutes     Chief Complaint: Hand Weakness and Hand Problem    Visit #: 4    SUBJECTIVE:  Patient reports he has been working on his right leg strength at home and thinks his gait is improving.    OBJECTIVE:  Current objective measures:   Active Range of Motion:   Upper extremity (left):     Wrist flexion: Below functional limits (fused wrist)    Wrist extension: Below functional limits (fused wrist)  Upper extremity (right):     All right upper extremity active range of motion: All within functional limits     Strength:   Upper extremity strength (left):     Shoulder flexion: 4    Shoulder abduction: 4    Elbow flexion: 4    Forearm pronation: 4-    Forearm supination: -4  Upper extremity strength (right):    Shoulder flexion: 4    Shoulder abduction: 4    Elbow flexion: 4    Elbow extension: 4    Forearm pronation: 4    Forearm supination: 4     , Prehension, Pinch:  /Prehension (left):     Strength Left : 26 lbs.    Opposition: Within functional limits  /Prehension (right):     Strength Right : 56 lbs.    Opposition: Within functional limits  Pinch (left):     Pinch Left Tip to Tip: 10 lbs     Pinch Left Three Point: 15 l bs.    Pinch Left Lateral: 19 lbs.  Pinch (right):    Pinch Right Tip to Tip: 15 lbs.    Pinch Right Three Point: 16 lbs.    Pinch Right Lateral: 17 lbs.     Sensation   Upper extremity (right):     Light touch: Intact    Sharp/dull: Intact     Stereognosis: Intact     Proprioception: Intact      Coordination   Coordination comments:   9 hole peg:  Right: 26 seconds   Left: 25 seconds      Grooved peg board:  Right: 2  min 11 secs     Cognition:     Orientation: normal to time, normal to place, normal to person and normal to situation    Short term memory: intact    Attention span: intact    Sequencing: intact    Organization: intact    Problem solving: intact    Judgement and safety awareness: intact        Therapeutic Exercises (CPT 99877):     1. Grooved pegboard , x30    2. Grooved pegboard x 3 at a time, x30    3. [place] finger-thumb translation, x50    4. 10 lb black digi flex, 3 x 10    5. 11 lb resisted clothes pins , 3 x 5 each pinch     6. BUE and BLE [employer] bike, 10 mins level 5 resistance     8. 3 lb dumbbell wrist flex/ext, 3 x 10    10. DIP strengthening resisted [place]      Time-based treatments/modalities:  Therapeutic exercise minutes (CPT 12091): 45 minutes        Pain rating before treatment: 3  Pain rating after treatment: 3    ASSESSMENT:   Response to treatment: Patient continues to improve his dominant right hand  strength, as well as, his fine motor coordination. 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 Patient adiditonally participated in "[employer] and [employer] bike on this date to address muscular endurance. Patient would benefit from continued skilled OT services to address dominant right hand strengthening and fine motor coordination.     PLAN/RECOMMENDATIONS:   Plan for treatment: therapy treatment to continue next visit.  Planned interventions for next visit: therapeutic exercise (CPT 76289)

## 2021-11-10 ENCOUNTER — OFFICE VISIT (OUTPATIENT)
Dept: NEUROLOGY | Facility: MEDICAL CENTER | Age: 76
End: 2021-11-10
Attending: NURSE PRACTITIONER
Payer: MEDICARE

## 2021-11-10 ENCOUNTER — OCCUPATIONAL THERAPY (OUTPATIENT)
Dept: OCCUPATIONAL THERAPY | Facility: REHABILITATION | Age: 76
End: 2021-11-10
Attending: PHYSICAL MEDICINE & REHABILITATION
Payer: MEDICARE

## 2021-11-10 VITALS
RESPIRATION RATE: 14 BRPM | TEMPERATURE: 97.8 F | WEIGHT: 213 LBS | HEART RATE: 74 BPM | SYSTOLIC BLOOD PRESSURE: 110 MMHG | HEIGHT: 68 IN | DIASTOLIC BLOOD PRESSURE: 66 MMHG | BODY MASS INDEX: 32.28 KG/M2 | OXYGEN SATURATION: 94 %

## 2021-11-10 DIAGNOSIS — I63.9 CEREBRAL INFARCTION, UNSPECIFIED MECHANISM (HCC): ICD-10-CM

## 2021-11-10 DIAGNOSIS — E78.2 MIXED HYPERLIPIDEMIA: ICD-10-CM

## 2021-11-10 DIAGNOSIS — I65.23 BILATERAL CAROTID ARTERY STENOSIS: ICD-10-CM

## 2021-11-10 DIAGNOSIS — I10 ESSENTIAL HYPERTENSION: ICD-10-CM

## 2021-11-10 DIAGNOSIS — I69.30 LATE EFFECT OF STROKE: ICD-10-CM

## 2021-11-10 PROCEDURE — 99215 OFFICE O/P EST HI 40 MIN: CPT | Performed by: NURSE PRACTITIONER

## 2021-11-10 PROCEDURE — 97110 THERAPEUTIC EXERCISES: CPT

## 2021-11-10 ASSESSMENT — ENCOUNTER SYMPTOMS
VOMITING: 0
SHORTNESS OF BREATH: 0
SENSORY CHANGE: 0
FOCAL WEAKNESS: 1
NAUSEA: 0
NECK PAIN: 1
SPEECH CHANGE: 1
FEVER: 0
COUGH: 0
BRUISES/BLEEDS EASILY: 1
DEPRESSION: 0
BLURRED VISION: 0
DOUBLE VISION: 0
TINGLING: 0
DIZZINESS: 0
HEADACHES: 0
HEARTBURN: 0
BACK PAIN: 1
NERVOUS/ANXIOUS: 0

## 2021-11-10 ASSESSMENT — FIBROSIS 4 INDEX: FIB4 SCORE: 1.76

## 2021-11-10 NOTE — PROGRESS NOTES
Subjective    HPI    Alexey Caballero is a 76 y.o. right handed male who presents to [facility] for evaluation of multiple left cerebral hemisphere infacts.     He presented to [facility] on 10/12/2021 with complaints of heaviness of right side, difficulty walking, word finding difficulty and poor coordination upon awakening that morning. Blood pressure on admission 165/91.  NIHSS 2 on admission.    PMH includes Obesity, HLD, HTN, CAD, MI w/ stents x 2, carotid artery stenosis bilateral.  Social History:  Former smoker quit April 20, 1975 @ 4:20 PM denies alcohol or drug use    Prior to admission was only taking ASA 81mg M,W,F,, he was discharged on daily 81mg ASA and Atorvastatin.  He was not on statin prior to hospitalization.   He reports a healthy diet.      Patient is here alone today.  He is getting OT and will be starting PT.  He uses a cane when needed.  He reports he is also working hard at home to do his own therapy.   He feels like his speech is recovering, he will have some difficulty with speech when he is tired, he feels like his tongue gets tied.       Review of Systems   Constitutional: Negative for fever.   HENT: Negative for nosebleeds.    Eyes: Negative for blurred vision and double vision.   Respiratory: Negative for cough and shortness of breath.    Cardiovascular: Negative for chest pain.   Gastrointestinal: Negative for heartburn, nausea and vomiting.   Musculoskeletal: Positive for back pain and neck pain.   Skin: Negative for rash.   Neurological: Positive for speech change and focal weakness. Negative for dizziness, tingling, sensory change and headaches.   Endo/Heme/Allergies: Bruises/bleeds easily.   Psychiatric/Behavioral: Negative for depression. The patient is not nervous/anxious.            Objective      I personally reviewed imaging below and agree with the findings  MRI brain 10/12/2021   Small foci of acute infarction in the left frontal  "subcortical region, left posterior limb internal capsule/corona radiata and left occipital lobe     Remote left medial parietal, left coronal radiata and left basal ganglia lacunar infarcts.    CTA head 10/12/2021  The basilar artery is diminutive. There is normal variant fetal origin of the posterior cerebral arteries bilaterally.     The anterior circulation shows no stenotic or occlusive lesion. No aneurysm is evident about the [place].     The brain is unremarkable.    CTA neck 10/12/2021  1 Left carotid arterial bifurcation atherosclerotic plaque results in moderate (50-70% diameter) stenosis.     2.  Right carotid arterial bifurcation atherosclerotic plaque results in mild (less than 50% diameter) stenosis.     3Patent vertebral arteries.    TTE:   LVEF 60%, LA size WNL, TIMBO 24    Stroke Labs:  Creat 0.88, , A1C 5.5.      /66 (BP Location: Left arm, Patient Position: Sitting, BP Cuff Size: Large adult)   Pulse 74   Temp 36.6 °C (97.8 °F) (Temporal)   Resp 14   Ht 1.727 m (5' 8\")   Wt 96.6 kg (213 lb)   SpO2 94%   BMI 32.39 kg/m²         PHYSICAL ASSESSMENT  Constitutional:  Alert, no apparent distress,  Psych:   mood and affect WNL  Muskuloskeletal:  Moves all extremities equally, strength 5/5 bilaterally, no drift  NEUROLOGICAL ASSESSMENT  Oriented X 4, speech fluent, naming and memory intact  CN II: Visual fields are full to confrontation. Fundoscopic exam is normal with sharp discs and no vascular changes. Pupils are 3  mm and briskly reactive to light.   CN III: IV, VI  EOMs intact, no ptosis  CN V: Facial sensation is intact to pinprick in all 3 divisions bilaterally. Corneal responses are intact.  CN VII: Face is symmetric with normal eye closure and smile.  CN VIII Hearing is normal to rubbing fingers  CN IX, X: Palate elevates symmetrically. Phonation is normal.  CN XI: Head turning and shoulder shrug are intact  CN XII: Tongue is midline with normal movements and no " atrophy.                           Sensation to PP equal bilaterally                 No limb ataxia with finger to nose and heel to shin                 Ambulates with steady gait.                            Biceps,brachioradialis, tricep, patellar reflexes all 2+      Cardiovascular:    S1S2, no abnormal rhythm auscultated, no peripheral edema  Neck:                     No carotid bruits noted   Pulmonary:            Respirations easy, lungs clear to auscultation all fields.     Skin:                     No obvious rashes.    Iniital NIHSS  2      Current NIHSS    1a. LOC: 0  1b. LOC Questions: 0  1c. LOC Commands: 0  2. Best Gaze:0  3. Visual Fields: 0  4. Facial Paresis: 0  5a. Motor arm left: 0  5b. Motor arm right: 0  6a. Motor leg left: 0  6b. Motor leg right: 0  7. Sensory: 0  8. Best Language: 0  9. Limb Ataxia: 0  10. Dysarthria: 0  11. Extinction/Inattention: 0    Total Score Current  0        Current mRS  2  (cannot type as fast as his used to).        Assessment & Plan     1. Late effect of stroke    L cerebral hemisphere infarcts likely secondary to L carotid artery stenosis.   Presumed Mechanism by [place]:  XX__  Large Artery Atherosclerosis  __  Small Vessel (lacunar)  __   Cardioembolic  __   Other (Sickle cell, vasculitis, hypercoagulable)  __   Unknown  __   ESUS    Stroke risk factors include HTN, HLD,       Continue Aspirin 81mg PO daily,  discussed side effects, signs of bleeding      2. Bilateral carotid artery stenosis       Referral to vascular surgery    3. Essential hypertension  Blood pressure goal less than 130/80, currently at goal     4. Mixed hyperlipidemia  DL goal < 70, current 145 continue Atorvastatin 80mg discussed medication side effects, will need follow up with primary care evaluate liver function at intervals and refill  Exercise at least 30 minutes daily, avoid red meat, fried foods, butter, cheese.   Eat 5-6 servings of vegetables and fruits daily, choose lean white meat  without skin (chicken, turkey, white fish)--baked, broiled or grilled.          If any new signs of stroke:  sudden weakness, numbness, speech difficulty (slurring or difficulty finding words), imbalance, incoordination, worse headache of life or vision loss occur, call 911.      Take all medications as prescribed unless instructed by your provider.      I spent a total of 57 minutes caring for patient,  my time includes counseling, review of systems, HPI and assessment, review of images, labs and testing as above.  I reviewed the hospital records, PMH, social and family history.   I have counseled patient on stroke prevention strategies, stroke symptoms and mimics.  Diet and exercise modifications.  We discussed medication side effects and instructions.       I have provided patient a written personalized stroke prevention plan, it is filed under the media tab under ‘[facility]”.

## 2021-11-10 NOTE — OP THERAPY DAILY TREATMENT
Outpatient Occupational Therapy  DAILY TREATMENT     [facility] Occupational Therapy [address]  [address]  [place] [address]  Phone:  [phone]  Fax:  [phone]    Date: 11/10/2021    Patient: Alexey Caballero  YOB: 1945  MRN: [id number]     Time Calculation  Start time: 0729  Stop time: 0813 Time Calculation (min): 44 minutes     Chief Complaint: Hand Weakness, Hand Problem, and Handwriting    Visit #: 5    SUBJECTIVE:  Patient reports he has been using his total gym at home for BUE strengthening.    OBJECTIVE:  Current objective measures:   Active Range of Motion:   Upper extremity (left):     Wrist flexion: Below functional limits (fused wrist)    Wrist extension: Below functional limits (fused wrist)  Upper extremity (right):     All right upper extremity active range of motion: All within functional limits     Strength:   Upper extremity strength (left):     Shoulder flexion: 4    Shoulder abduction: 4    Elbow flexion: 4    Forearm pronation: 4-    Forearm supination: -4  Upper extremity strength (right):    Shoulder flexion: 4    Shoulder abduction: 4    Elbow flexion: 4    Elbow extension: 4    Forearm pronation: 4    Forearm supination: 4     , Prehension, Pinch:  /Prehension (left):     Strength Left : 26 lbs.    Opposition: Within functional limits  /Prehension (right):     Strength Right : 58 lbs.    Opposition: Within functional limits  Pinch (left):     Pinch Left Tip to Tip: 10 lbs     Pinch Left [place]: 15 l bs.    Pinch Left Lateral: 19 lbs.  Pinch (right):    Pinch Right Tip to Tip: 15 lbs.    Pinch Right [place]: 16 lbs.    Pinch Right Lateral: 17 lbs.     Sensation   Upper extremity (right):     Light touch: Intact    Sharp/dull: Intact     Stereognosis: Intact     Proprioception: Intact      Coordination   Coordination comments:   9 hole peg:  Right: 26 seconds   Left: 25 seconds      Grooved peg board:  Right: 1 min 55  secs     Cognition:     Orientation: normal to time, normal to place, normal to person and normal to situation    Short term memory: intact    Attention span: intact    Sequencing: intact    Organization: intact    Problem solving: intact    Judgement and safety awareness: intact        Therapeutic Exercises (CPT 27404):     1. Grooved pegboard , x30    2. Grooved pegboard x 3 at a time, x30    3. [place] finger-thumb translation, x50    4. 10 lb black digi flex, 3 x 10    5. 11 lb resisted clothes pins , 3 x 5 each pinch     6. BUE and BLE NuStep bike, 10 mins level 5 resistance     8. 3 lb dumbbell wrist flex/ext, 3 x 10    10. DIP strengthening resisted [place]      Time-based treatments/modalities:  Therapeutic exercise minutes (CPT 86896): 44 minutes        Pain rating before treatment: 0  Pain rating after treatment: 0    ASSESSMENT:   Response to treatment: Patient continues to progress with his right hand strengthening. He has increased his right  strength by 2 lbs since last measurement. He additionally has improved his fine motor coordination with his dominant right hand as evidenced by a decrease in his time on the grooved pegboard by 16 seconds. Patient continues to be very motivated to participate in therapy and is compliant with HEP. He would benefit from continued skilled OT services to address dominant right hand function.    PLAN/RECOMMENDATIONS:   Plan for treatment: therapy treatment to continue next visit.  Planned interventions for next visit: therapeutic exercise (CPT 09370)

## 2021-11-12 ENCOUNTER — OCCUPATIONAL THERAPY (OUTPATIENT)
Dept: OCCUPATIONAL THERAPY | Facility: REHABILITATION | Age: 76
End: 2021-11-12
Attending: PHYSICAL MEDICINE & REHABILITATION
Payer: MEDICARE

## 2021-11-12 DIAGNOSIS — I63.9 CEREBRAL INFARCTION, UNSPECIFIED MECHANISM (HCC): ICD-10-CM

## 2021-11-12 PROCEDURE — 97110 THERAPEUTIC EXERCISES: CPT

## 2021-11-12 NOTE — OP THERAPY DAILY TREATMENT
Outpatient Occupational Therapy  DAILY TREATMENT     [facility] Occupational [address].  [address]  [place] [address]  Phone:  [phone]  Fax:  [phone]    Date: 11/12/2021    Patient: Alexey Caballero  YOB: 1945  MRN: [id number]     Time Calculation  Start time: 0731  Stop time: 0815 Time Calculation (min): 44 minutes         Chief Complaint: Hand Problem and Hand Weakness    Visit #: 6    SUBJECTIVE:  Patient reports he saw the MD yesterday and she reports he is doing well.     OBJECTIVE:  Current objective measures:   Active Range of Motion:   Upper extremity (left):     Wrist flexion: Below functional limits (fused wrist)    Wrist extension: Below functional limits (fused wrist)  Upper extremity (right):     All right upper extremity active range of motion: All within functional limits     Strength:   Upper extremity strength (left):     Shoulder flexion: 4    Shoulder abduction: 4    Elbow flexion: 4    Forearm pronation: 4-    Forearm supination: -4  Upper extremity strength (right):    Shoulder flexion: 4    Shoulder abduction: 4    Elbow flexion: 4    Elbow extension: 4    Forearm pronation: 4    Forearm supination: 4     , Prehension, Pinch:  /Prehension (left):     Strength Left : 26 lbs.    Opposition: Within functional limits  /Prehension (right):     Strength Right : 58 lbs.    Opposition: Within functional limits  Pinch (left):     Pinch Left Tip to Tip: 10 lbs     Pinch Left [place]: 15 l bs.    Pinch Left Lateral: 19 lbs.  Pinch (right):    Pinch Right Tip to Tip: 15 lbs.    Pinch Right [place]: 16 lbs.    Pinch Right Lateral: 17 lbs.     Sensation   Upper extremity (right):     Light touch: Intact    Sharp/dull: Intact     Stereognosis: Intact     Proprioception: Intact      Coordination   Coordination comments:   9 hole peg:  Right: 26 seconds   Left: 25 seconds      Grooved peg board:  Right: 1 min 55  secs     Cognition:     Orientation: normal to time, normal to place, normal to person and normal to situation    Short term memory: intact    Attention span: intact    Sequencing: intact    Organization: intact    Problem solving: intact    Judgement and safety awareness: intact        Therapeutic Exercises (CPT 57818):     1. Grooved pegboard , x30    2. Grooved pegboard x 3 at a time, x30    3. [place] finger-thumb translation, x50    4. 10 lb black digi flex, 3 x 10, RUE    5. 11 lb resisted clothes pins , 3 x 5 each pinch , RUE    6. BUE and BLE NuStep bike, 10 mins level 7 resistance     7. Wrist flexion/extension velcro roller, x10, RUE    8. 4 lb dumbbell wrist flex/ext, 3 x 10, RUE    9. Finger flexion/extension velcro roller , x10, RUE    10. DIP strengthening resisted [place], 2 x 10, RUE    11. 7 lb dumbbell bicep curls , 3 x 10, RUE      Time-based treatments/modalities:  Therapeutic exercise minutes (CPT 32560): 44 minutes        Pain rating before treatment: 0  Pain rating after treatment: 0    ASSESSMENT:   Response to treatment: Patient continues to be very motivated in therapy and is working hard with HEP. Patient is progressing well with his BUE strength and muscular endurance. He continues to improve his dexterity with dominant right hand during grooved pegboard exercise. Patient would benefit from continued skilled OT services to address right hand strengthening and to improve fine motor coordination necessary to his IADLs and leisure activities.     PLAN/RECOMMENDATIONS:   Plan for treatment: therapy treatment to continue next visit.  Planned interventions for next visit: therapeutic exercise (CPT 22746)

## 2021-11-17 ENCOUNTER — OCCUPATIONAL THERAPY (OUTPATIENT)
Dept: OCCUPATIONAL THERAPY | Facility: REHABILITATION | Age: 76
End: 2021-11-17
Attending: PHYSICAL MEDICINE & REHABILITATION
Payer: MEDICARE

## 2021-11-17 DIAGNOSIS — I63.9 CEREBRAL INFARCTION, UNSPECIFIED MECHANISM (HCC): ICD-10-CM

## 2021-11-17 PROCEDURE — 97110 THERAPEUTIC EXERCISES: CPT

## 2021-11-17 PROCEDURE — 97530 THERAPEUTIC ACTIVITIES: CPT

## 2021-11-17 NOTE — OP THERAPY DAILY TREATMENT
Outpatient Occupational Therapy  DAILY TREATMENT     [facility] Occupational Therapy [address].  [address]  [place] [address]  Phone:  [phone]  Fax:  [phone]    Date: 11/17/2021    Patient: Alexey Caballero  YOB: 1945  MRN: [id number]     Time Calculation  Start time: 0759  Stop time: 0840 Time Calculation (min): 41 minutes         Chief Complaint: Hand Problem and Hand Weakness    Visit #: 7    SUBJECTIVE:  Patient reports he went to the doctor last week and had to fill out forms. He reports his handwriting speed and neatness in almost back to normal. He additionally reports minimal difficulties with his signature when signing forms.    OBJECTIVE:  Current objective measures:   Active Range of Motion:   Upper extremity (left):     Wrist flexion: Below functional limits (fused wrist)    Wrist extension: Below functional limits (fused wrist)  Upper extremity (right):     All right upper extremity active range of motion: All within functional limits     Strength:   Upper extremity strength (left):     Shoulder flexion: 4    Shoulder abduction: 4    Elbow flexion: 4    Forearm pronation: 4-    Forearm supination: -4  Upper extremity strength (right):    Shoulder flexion: 4    Shoulder abduction: 4    Elbow flexion: 4    Elbow extension: 4    Forearm pronation: 4    Forearm supination: 4     , Prehension, Pinch:  /Prehension (left):     Strength Left : 26 lbs.    Opposition: Within functional limits  /Prehension (right):     Strength Right : 58 lbs.    Opposition: Within functional limits  Pinch (left):     Pinch Left Tip to Tip: 10 lbs     Pinch Left [place]: 15 l bs.    Pinch Left Lateral: 19 lbs.  Pinch (right):    Pinch Right Tip to Tip: 15 lbs.    Pinch Right [place]: 16 lbs.    Pinch Right Lateral: 17 lbs.     Sensation   Upper extremity (right):     Light touch: Intact    Sharp/dull: Intact     Stereognosis: Intact     Proprioception:  Intact      Coordination   Coordination comments:   9 hole peg:  Right: 26 seconds   Left: 25 seconds      Grooved peg board:  Right: 1 min 55 secs     Cognition:     Orientation: normal to time, normal to place, normal to person and normal to situation    Short term memory: intact    Attention span: intact    Sequencing: intact    Organization: intact    Problem solving: intact    Judgement and safety awareness: intact        Therapeutic Exercises (CPT 60485):     1. Grooved pegboard , x30    2. Grooved pegboard x 3 at a time, x30    3. Grooved pegboard x 5 at a time     4. 10 lb black digi flex, 3 x 10, RUE    5. 11 lb resisted clothes pins , 3 x 5 each pinch , RUE    6. BUE and BLE NuStep bike, 8 mins level 8 resistance     8. 4 lb dumbbell wrist flex/ext, 3 x 10, RUE    10. DIP strengthening resisted [place], 2 x 10, RUE    Therapeutic Treatments and Modalities:    1. Therapeutic Activities (CPT 27825), See Below    Therapeutic Treatments and Modalities Summary: Patient additionally participated in Nuts and bolts fine motor and dexterity activity with dominant right hand. Patient screwed and unscrewed bolts of different sizes x 15 utilizing right hand. Patient reported some discomfort in right thumb during this activity, however no more than usual for him.     Time-based treatments/modalities:  Therapeutic exercise minutes (CPT 80383): 28 minutes  Therapeutic activity minutes (CPT 33345): 13 minutes        Pain rating before treatment: 0  Pain rating after treatment: 0    ASSESSMENT:   Response to treatment: Patient is progressing with his muscular strength and endurance on affected RUE. He reports improve dexterity with his handwriting utilizing dominant right hand when filling out forms at a doctors office. Patient continues to be very motivated to participate in OT and is compliant with HEP. Patient would benefit from continued skilled OT services to address RUE strengthening and  coordination.    PLAN/RECOMMENDATIONS:   Plan for treatment: therapy treatment to continue next visit.  Planned interventions for next visit: therapeutic exercise (CPT 74146)

## 2021-11-18 ENCOUNTER — PATIENT MESSAGE (OUTPATIENT)
Dept: NEUROLOGY | Facility: MEDICAL CENTER | Age: 76
End: 2021-11-18

## 2021-11-19 ENCOUNTER — OCCUPATIONAL THERAPY (OUTPATIENT)
Dept: OCCUPATIONAL THERAPY | Facility: REHABILITATION | Age: 76
End: 2021-11-19
Attending: PHYSICAL MEDICINE & REHABILITATION
Payer: MEDICARE

## 2021-11-19 DIAGNOSIS — I63.9 CEREBRAL INFARCTION, UNSPECIFIED MECHANISM (HCC): ICD-10-CM

## 2021-11-19 PROCEDURE — 97110 THERAPEUTIC EXERCISES: CPT

## 2021-11-19 PROCEDURE — 97530 THERAPEUTIC ACTIVITIES: CPT

## 2021-11-19 NOTE — OP THERAPY DAILY TREATMENT
Outpatient Occupational Therapy  DAILY TREATMENT     [facility] Occupational Therapy [address].  [address]  [place] [address]  Phone:  [phone]  Fax:  [phone]    Date: 11/19/2021    Patient: Alexey Caballero  YOB: 1945  MRN: [id number]     Time Calculation  Start time: 0758  Stop time: 0841 Time Calculation (min): 43 minutes     Chief Complaint: Hand Weakness and Hand Problem    Visit #: 8    SUBJECTIVE:  Patient reports he is no longer having any difficulties with any ADL or IADL tasks. He reports he is not driving at this time d/t having surgery in a few weeks     OBJECTIVE:  Current objective measures:   Active Range of Motion:   Upper extremity (left):     Wrist flexion: Below functional limits (fused wrist)    Wrist extension: Below functional limits (fused wrist)  Upper extremity (right):     All right upper extremity active range of motion: All within functional limits     Strength:   Upper extremity strength (left):     Shoulder flexion: 4    Shoulder abduction: 4    Elbow flexion: 4    Forearm pronation: 4-    Forearm supination: -4  Upper extremity strength (right):    Shoulder flexion: 4    Shoulder abduction: 4    Elbow flexion: 4    Elbow extension: 4    Forearm pronation: 4    Forearm supination: 4     , Prehension, Pinch:  /Prehension (left):     Strength Left : 26 lbs.    Opposition: Within functional limits  /Prehension (right):     Strength Right : 61 lbs.    Opposition: Within functional limits  Pinch (left):     Pinch Left Tip to Tip: 10 lbs     Pinch Left [place]: 15 l bs.    Pinch Left Lateral: 19 lbs.  Pinch (right):    Pinch Right Tip to Tip: 17 lbs.    Pinch Right [place]: 17 lbs.    Pinch Right Lateral: 18 lbs.     Sensation   Upper extremity (right):     Light touch: Intact    Sharp/dull: Intact     Stereognosis: Intact     Proprioception: Intact      Coordination   Coordination comments:   9 hole peg:  Right: 26  seconds   Left: 25 seconds      Grooved peg board:  Right: 1 min 55 secs     Cognition:     Orientation: normal to time, normal to place, normal to person and normal to situation    Short term memory: intact    Attention span: intact    Sequencing: intact    Organization: intact    Problem solving: intact    Judgement and safety awareness: intact    UEFI = 76/80        Therapeutic Exercises (CPT 70017):     1. Grooved pegboard , x30    2. Grooved pegboard x 3 at a time, x30    3. Grooved pegboard x 5 at a time     4. 10 lb black digi flex, 3 x 10, RUE    5. 11 lb resisted clothes pins , 3 x 5 each pinch , RUE    6. BUE and BLE NuStep bike, 8 mins level 8 resistance     8. 4 lb dumbbell wrist flex/ext, 3 x 10, RUE    10. DIP strengthening resisted [place], 2 x 10, RUE    Therapeutic Treatments and Modalities:    1. Therapeutic Activities (CPT 42024), See Below    Therapeutic Treatments and Modalities Summary: Patient additionally participated in Nuts and bolts fine motor and dexterity activity with dominant right hand. Patient screwed and unscrewed bolts of different sizes x 15 utilizing right hand. Patient reported some discomfort in right thumb during this activity, however no more than usual for him.     Time-based treatments/modalities:  Therapeutic exercise minutes (CPT 15318): 30 minutes  Therapeutic activity minutes (CPT 68218): 13 minutes          ASSESSMENT:   Response to treatment: Please see d/c summary for assessment    PLAN/RECOMMENDATIONS:   Plan for treatment: discharge patient due to accomplished goals.  Planned interventions for next visit: Last OT visit

## 2021-11-19 NOTE — OP THERAPY DISCHARGE SUMMARY
Outpatient Occupational Therapy  DISCHARGE SUMMARY NOTE    [facility] Occupational Therapy [facility]  [address]  [address]  [place] [address]  Phone:  [phone]  Fax:  [phone]    Date of Visit: 11/19/2021    Patient: Alexey Caballero  YOB: 1945  MRN: [id number]     Referring Provider: Temi Travis M.D.  [facility] [facility]  [address]  [place] [address]   Referring Diagnosis: Cerebral infarction, unspecified [I63.9]     Functional Assessment Used    UEFI at eval = 49/80  UEFI at d/c = 76/80    Your patient is being discharged from Occupational Therapy with the following comments:   · Goals met    Comments:     Patient was seen a total of 8 visits. Patient has progressed well in therapy and no longer reports any difficulties with ADL, IADL,or leisure tasks. Patient has increased his dominant right hand  strength by 32 lbs and has increased his right pinch strength by 3 lbs since beginning therapy. Patient additionally has improved his fine motor coordination as evidenced by a decrease in his time on the grooved peg board test. Patient has met all OT goals at this time and is to be d/c from OT services.    Short Term Goals:   1. Patient will be independent with HEP. (MET)  2. Patient will decrease his time on the grooved peg board test by 15 seconds with dominant right hand. (MET-increased by 27 seconds)  3. Patient will increase right dominant hand  strength by 15 lbs in order to increase functional use of affected RUE. (MET- increased by 32 lbs)      Long Term Goals:   1. Patient will decrease his time on the grooved peg board test by 30 seconds with dominant right hand. (Not MET-increased by 27 seconds)  2. Patient will increase right dominant hand  strength by 25 lbs in order to increase functional use of affected RUE. (MET- increased by 32 lbs)  3. Patient will increase his score on the Upper Extremity Functional Index (UEFI) by 20 points. (MET- increased by 27  points)  4. Patient will report minimal difficulties with handwriting utilizing dominant right hand. (MET)     Limitations Remaining:  N/A    Recommendations:  1. Follow up with MD as needed.  2. Continue with PT for balance  3. Continue with HEP for RUE strengthening and dexterity     YASMINE Kwon, OTR/L    Date: 11/19/2021

## 2021-11-23 ENCOUNTER — PHYSICAL THERAPY (OUTPATIENT)
Dept: PHYSICAL THERAPY | Facility: REHABILITATION | Age: 76
End: 2021-11-23
Attending: PHYSICAL MEDICINE & REHABILITATION
Payer: MEDICARE

## 2021-11-23 DIAGNOSIS — I63.9 CEREBRAL INFARCTION, UNSPECIFIED MECHANISM (HCC): ICD-10-CM

## 2021-11-23 PROCEDURE — 97163 PT EVAL HIGH COMPLEX 45 MIN: CPT

## 2021-11-23 SDOH — ECONOMIC STABILITY: GENERAL: QUALITY OF LIFE: GOOD

## 2021-11-23 ASSESSMENT — ENCOUNTER SYMPTOMS: PAIN SCALE: 0

## 2021-11-23 ASSESSMENT — ACTIVITIES OF DAILY LIVING (ADL): POOR_BALANCE: 1
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
## 2021-11-24 ENCOUNTER — APPOINTMENT (OUTPATIENT)
Dept: OCCUPATIONAL THERAPY | Facility: REHABILITATION | Age: 76
End: 2021-11-24
Attending: PHYSICAL MEDICINE & REHABILITATION
Payer: MEDICARE

## 2021-11-30 ENCOUNTER — APPOINTMENT (OUTPATIENT)
Dept: PHYSICAL THERAPY | Facility: REHABILITATION | Age: 76
End: 2021-11-30
Attending: PHYSICAL MEDICINE & REHABILITATION
Payer: MEDICARE

## 2021-11-30 ENCOUNTER — TELEPHONE (OUTPATIENT)
Dept: NEUROLOGY | Facility: MEDICAL CENTER | Age: 76
End: 2021-11-30

## 2021-11-30 DIAGNOSIS — I63.9 CEREBRAL INFARCTION, UNSPECIFIED MECHANISM (HCC): ICD-10-CM

## 2021-11-30 PROCEDURE — 97116 GAIT TRAINING THERAPY: CPT

## 2021-11-30 NOTE — OP THERAPY DAILY TREATMENT
"  Outpatient Physical Therapy  DAILY TREATMENT     [facility] Physical [address].  [address]  [place] [address]  Phone:  [phone]  Fax:  [phone]    Date: 11/30/2021    Patient: Alfredo Caballero  YOB: 1945  MRN: [id number]     Time Calculation    Start time: 1531  Stop time: 1615 Time Calculation (min): 44 minutes         Chief Complaint: Difficulty Walking    Visit #: 2    SUBJECTIVE:  The pt reports that he is improving every day. His greatest concerns are his balance and his stamina with walking. He states that he does not walk at the same pace as he did before.     OBJECTIVE:  Current objective measures: Pre-session vitals 94% O2, 68 bpm, /82           Therapeutic Treatments and Modalities:     1. Gait Training (CPT 03180), See below    Therapeutic Treatment and Modalities Summary: Lite Gait Treadmill training: harness donned for pt safety, no BWS utilized.   Round 1: 1.9 mph without UE assist as much as able for 3 minutes, vitals post 95 bpm, 96 O2%   Round 2: 1.9 mph without UE assist for 3 minute, vitals post 98 bpm, 95 %O2  Round 3: 1.8 mph for 4 minute, vitals post 95 bpm, 95 %O2  Round 4: 1.9 mph for 2 minute, vitals post 95 bpm, 94 %O2    Total walking distance: 1892 ft.     Standing rest breaks between bouts of walking. Pt given seated rest break following gait training.     Time-based treatments/modalities:    Physical Therapy Timed Treatment Charges  Gait training minutes (CPT 67251): 38 minutes      ASSESSMENT:   Response to treatment: The pt did well with initiation of treadmill gait training to promote his gait symmetry and functional stamina. He demonstrated decreased arm swing on the R, but was able to improve his swing with verbal cueing. He fatigued more quickly on the R LE, and expressed sensation of a \"calf cramp\" at the end of gait training. The pt will continue to benefit from skilled physical therapy intervention to maximize his return " to active PLOF.     PLAN/RECOMMENDATIONS:   Plan for treatment: therapy treatment to continue next visit.  Planned interventions for next visit: continue with current treatment. Continue to progress higher level dynamic balance and mobility, gait training as tolerated.

## 2021-12-02 DIAGNOSIS — I69.30 LATE EFFECT OF STROKE: ICD-10-CM

## 2021-12-03 ENCOUNTER — PHYSICAL THERAPY (OUTPATIENT)
Dept: PHYSICAL THERAPY | Facility: REHABILITATION | Age: 76
End: 2021-12-03
Attending: PHYSICAL MEDICINE & REHABILITATION
Payer: MEDICARE

## 2021-12-03 DIAGNOSIS — I63.9 CEREBRAL INFARCTION, UNSPECIFIED MECHANISM (HCC): ICD-10-CM

## 2021-12-03 PROCEDURE — 97112 NEUROMUSCULAR REEDUCATION: CPT

## 2021-12-03 PROCEDURE — 97116 GAIT TRAINING THERAPY: CPT

## 2021-12-03 PROCEDURE — 97110 THERAPEUTIC EXERCISES: CPT

## 2021-12-03 NOTE — OP THERAPY DAILY TREATMENT
"  Outpatient Physical Therapy  DAILY TREATMENT     [facility] Physical [address]  [address]  [place] [address]  Phone:  [phone]  Fax:  [phone]    Date: 12/03/2021    Patient: Alfredo Caballero  YOB: 1945  MRN: [id number]     Time Calculation      245  330           Chief Complaint: No chief complaint on file.    Visit #: 3    SUBJECTIVE:  Patient reports that he still feels like he struggles with balance on R side  OBJECTIVE:  Current objective measures: Pre-session vitals 94% O2, 72 bpm at rest  Noted no observalbe R arm swing, increase katya and in creased JORGE--lob with patient asked to slow gait          Therapeutic Treatments and Modalities:     1. Gait Training (CPT 70920), See below    Therapeutic Treatment and Modalities Summary: Gait trg with focus on arm swing and lowing katya with m.c& v.c. 300 feet// pt. Instructed to ambulate with cane 100% of the time and use it to remind him to swing his r arm  Heel toe gait forwards/backwards with finger wall drag  Corner tandem coronal plane hip strategy--struggled with R foot in back// fatigue quickly w/ hr rising to 98 within 1 min of balance ex--multple seated rests--02 sats staid above 92% throughout treatment  Balloon volley// limited with R stepping strategy//LOB x 2 w/ max assist to recover  Standing rest breaks between bouts of walking. Pt given seated rest break following gait training.     Time-based treatments/modalities:    Physical Therapy Timed Treatment Charges  Gait training minutes (CPT 76502): 10 minutes  Neuromusc re-ed, balance, coor, post minutes (CPT 77148): 20 minutes  Therapeutic exercise minutes (CPT 10718): 10 minutes      ASSESSMENT:   He demonstrated decreased arm swing on the R, but was able to improve his swing with verbal cueing w/ use of spc. He fatigued quickly w/ balance activity when reliant on R l.e. Patient also expressed sensation of a \"calf cramp\" throughout treatment with " ambulation and balance activities. Patient struggled with timely R l.e. stepping strategy with poor balance reactions with dynamic activity. The pt will continue to benefit from skilled physical therapy intervention to maximize his return to active PLOF.     PLAN/RECOMMENDATIONS:   Plan for treatment: therapy treatment to continue next visit.  Planned interventions for next visit: continue with current treatment. Continue to progress higher level dynamic balance and mobility, gait training as tolerated.

## 2021-12-08 ENCOUNTER — APPOINTMENT (OUTPATIENT)
Dept: PHYSICAL THERAPY | Facility: REHABILITATION | Age: 76
End: 2021-12-08
Attending: PHYSICAL MEDICINE & REHABILITATION
Payer: MEDICARE

## 2021-12-13 ENCOUNTER — APPOINTMENT (OUTPATIENT)
Dept: PHYSICAL THERAPY | Facility: REHABILITATION | Age: 76
End: 2021-12-13
Attending: PHYSICAL MEDICINE & REHABILITATION
Payer: MEDICARE

## 2021-12-15 ENCOUNTER — APPOINTMENT (OUTPATIENT)
Dept: PHYSICAL THERAPY | Facility: REHABILITATION | Age: 76
End: 2021-12-15
Attending: PHYSICAL MEDICINE & REHABILITATION
Payer: MEDICARE

## 2021-12-20 ENCOUNTER — APPOINTMENT (OUTPATIENT)
Dept: PHYSICAL THERAPY | Facility: REHABILITATION | Age: 76
End: 2021-12-20
Attending: PHYSICAL MEDICINE & REHABILITATION
Payer: MEDICARE

## 2021-12-22 ENCOUNTER — APPOINTMENT (OUTPATIENT)
Dept: PHYSICAL THERAPY | Facility: REHABILITATION | Age: 76
End: 2021-12-22
Attending: PHYSICAL MEDICINE & REHABILITATION
Payer: MEDICARE

## 2022-01-03 ENCOUNTER — PHYSICAL THERAPY (OUTPATIENT)
Dept: PHYSICAL THERAPY | Facility: REHABILITATION | Age: 77
End: 2022-01-03
Attending: PHYSICAL MEDICINE & REHABILITATION
Payer: MEDICARE

## 2022-01-03 DIAGNOSIS — I63.9 CEREBRAL INFARCTION, UNSPECIFIED MECHANISM (HCC): ICD-10-CM

## 2022-01-03 PROCEDURE — 97110 THERAPEUTIC EXERCISES: CPT

## 2022-01-03 PROCEDURE — 97116 GAIT TRAINING THERAPY: CPT

## 2022-01-03 NOTE — OP THERAPY DAILY TREATMENT
"  Outpatient Physical Therapy  DAILY TREATMENT     [facility] Physical [address].  [address]  [place] [address]  Phone:  [phone]  Fax:  [phone]    Date: 01/03/2022    Patient: Alfredo Caballero  YOB: 1945  MRN: [id number]     Time Calculation    Start time: 0807  Stop time: 0845 Time Calculation (min): 38 minutes         Chief Complaint: Loss Of Balance    Visit #: 4    SUBJECTIVE:  Endarterectomy on 12/6.  Was told no activity/ exercise until follow up visit.  Received clearance to return to activity at visit on 12/28.  He returned to workout at home, but tolerates decreased sets (2 instead of 4).  Of note, pt reports that he woke up on the 10th, felt like balance had returned.  Can now do balance exercises in corner without using UEs that he couldn't do previously, and can balance in R SLS for a short time now.    OBJECTIVE:        Therapeutic Exercises (CPT 60615):     1. Heel raises off 1\" lip, x15    2. Side shuffle in minisquat, x15    Therapeutic Treatments and Modalities:     1. Gait Training (CPT 53604)    Therapeutic Treatment and Modalities Summary: -Gait with tape on floor for midline orientation- exaggerated heel-toe rocker, forward and backward [id number] with SBA/SPV.  A few occurrences of stepping strategy for mild LOB.  -Walking while tossing med ball R<->L hand, forward walking 50feet, backward walking 20 feet.  -Modified SLS with opp foot on 4\" step: BUEs forward flexion, OH flexion, abd x2 B.  Med ball toss x15 B.  -Lateral gait on tape, [id number].  Lateral braiding on tape, x20 feet B with SBA.  -Cone taps R<->L x10B with intermittent light LUE touch for balance at //bars.  -SLS low target taps AP x10B with intermittent light LUE touch for balance at //bars  -standing on large rockerboard: static balance in stride stance x30sec B and in lateral stance x30 sec, and weight shift AP in stride stance x10B and lateral x10.    Time-based " treatments/modalities:    Physical Therapy Timed Treatment Charges  Gait training minutes (CPT 08262): 30 minutes  Therapeutic exercise minutes (CPT 52856): 8 minutes    ASSESSMENT:   Response to treatment: Pt demo improved standing balance and shyam for dynamic balance challenges.  Will benefit from continued PT to progress gait and dynamic balance activities in order to maximize safety and independence for return to PLOF.    PLAN/RECOMMENDATIONS:   Plan for treatment: therapy treatment to continue next visit.  Planned interventions for next visit: continue with current treatment.

## 2022-01-07 ENCOUNTER — PHYSICAL THERAPY (OUTPATIENT)
Dept: PHYSICAL THERAPY | Facility: REHABILITATION | Age: 77
End: 2022-01-07
Attending: PHYSICAL MEDICINE & REHABILITATION
Payer: MEDICARE

## 2022-01-07 DIAGNOSIS — I63.9 CEREBRAL INFARCTION, UNSPECIFIED MECHANISM (HCC): ICD-10-CM

## 2022-01-07 PROCEDURE — 97116 GAIT TRAINING THERAPY: CPT

## 2022-01-07 PROCEDURE — 97110 THERAPEUTIC EXERCISES: CPT

## 2022-01-07 NOTE — OP THERAPY DAILY TREATMENT
"  Outpatient Physical Therapy  DAILY TREATMENT     [address]  [address]  [place] [address]  Phone:  [phone]  Fax:  [phone]    Date: 01/07/2022    Patient: Alfredo Caballero  YOB: 1945  MRN: [id number]     Time Calculation    Start time: 0800  Stop time: 0845 Time Calculation (min): 45 minutes         Chief Complaint: Loss Of Balance and Difficulty Walking    Visit #: 5    SUBJECTIVE:  Pt reports he is getting a little better every day.    OBJECTIVE:        Therapeutic Exercises (CPT 81734):     1. Treadmill gait, 2.0mph, x7 min (2 min at 5 deg incline)    2. Side shuffle in minisquat, x15    3. Alt tap to 1\" lip, x10, x10 quick, SBA and occ UE support at //bars for balance for quick step/hop.  Difficulty clearing R foot.    4. Modified SLS with opp hip flex march from 4\" step, x10B forward, x10B lateral without UE support    5. Lateral lunge with disc glider, x10B    6. Posterior lunge with disc glider, x10B    7. Standing B heel raises, x25    8. Standing alt R/L toe taps, x25    9. Shuttle leg press, 5 cords BLEs x15, SL x15, 3 cords DL jump x20    Therapeutic Treatments and Modalities:     1. Gait Training (CPT 87580)    Therapeutic Treatment and Modalities Summary: -stride stance on Airex with med ball, OH press 10B, chest press x10B, trunk rotation x10B  -tandem gait 2x20 feet  -marching gait forward and backward 2x 20feet with intermittent SBA.  -gait with head turns forward and backward x20 feet with SBA  -Taps R<->Lto dynadiscs x10B with light BUE touch for balance at //bars.  -SLS step through AP (heel tap-toe tap to dynadiscs) x10B with light BUE touch for balance at //bars  -On trampoline: alt heel raises x20, DL heel raise bounce x20      Time-based treatments/modalities:    Physical Therapy Timed Treatment Charges  Gait training minutes (CPT 55413): 15 minutes  Therapeutic exercise minutes (CPT 63267): 30 minutes    ASSESSMENT: "   Response to treatment: Pt demo good balance reactions and improved LE functional strength.    PLAN/RECOMMENDATIONS:   Plan for treatment: therapy treatment to continue next visit.  Planned interventions for next visit: continue with current treatment.

## 2022-01-10 ENCOUNTER — PHYSICAL THERAPY (OUTPATIENT)
Dept: PHYSICAL THERAPY | Facility: REHABILITATION | Age: 77
End: 2022-01-10
Attending: PHYSICAL MEDICINE & REHABILITATION
Payer: MEDICARE

## 2022-01-10 DIAGNOSIS — I63.9 CEREBRAL INFARCTION, UNSPECIFIED MECHANISM (HCC): ICD-10-CM

## 2022-01-10 PROCEDURE — 97110 THERAPEUTIC EXERCISES: CPT

## 2022-01-10 PROCEDURE — 97116 GAIT TRAINING THERAPY: CPT

## 2022-01-10 NOTE — OP THERAPY DAILY TREATMENT
"  Outpatient Physical Therapy  DAILY TREATMENT     [facility] Physical [address].  [address]  [place] [address]  Phone:  [phone]  Fax:  [phone]    Date: 01/10/2022    Patient: Alfredo Caballero  YOB: 1945  MRN: [id number]     Time Calculation    Start time: 0845  Stop time: 0930 Time Calculation (min): 45 minutes         Chief Complaint: Loss Of Balance    Visit #: 6    SUBJECTIVE:  Pt reports he does not walk much because of the cold weather.  Walks in store when he goes shopping with his son, but not often.  Does a one hour exercise session daily.  Really focusing on lifting right foot when walking.    OBJECTIVE:        Therapeutic Exercises (CPT 64657):     1. Treadmill gait, 0.19 miles, 2.0mph, x6 min (3 min at 3 deg incline)    2. Side shuffle in minisquat, x15    3. Alt tap to 2x4 wood, x15, quick as possible    4. 6\" step ups, x15B without UE support    9. Shuttle leg press, 6 cords BLEs x20, 4 cords SL x20B, 3 cords DL jump x20    10. Lunge at round top BOSU, x10B    Therapeutic Treatments and Modalities:     1. Gait Training (CPT 66031)    Therapeutic Treatment and Modalities Summary: -Balance training on GaitSense: 2 min targeting activity:  1in excursion, 0.5 in targets (accuracy 20-40%s). 3in excirsion 0.75in targets (accuracy 30s-50%s).  1 min horizontal weight shift sway, 1 min AP weight shift sway  -AP weight shift in stride stance on large rockerboard x15B with minimal intermittent UE support at //bars.  -tandem gait 2x20 feet  - isometric step away with heavy theratubing x15 forward, x15 backward, x15B lateral .  -hip flex march from round top BOSU x15B.  -side step gait over tape x20 feet forward, x20 feet backward.  -lateral zig zag gait over tape x20 feet B  -SLS step through AP (heel tap-toe tap to towel targets) without UE support x15B \"quickly as you safely can\".      Time-based treatments/modalities:    Physical Therapy Timed Treatment " Charges  Gait training minutes (CPT 49956): 30 minutes  Therapeutic exercise minutes (CPT 91315): 15 minutes      ASSESSMENT:   Response to treatment: Pt demo improved shyam for balance and gait challenges without UE support.    PLAN/RECOMMENDATIONS:   Plan for treatment: therapy treatment to continue next visit.  Planned interventions for next visit: continue with current treatment.

## 2022-01-12 ENCOUNTER — PHYSICAL THERAPY (OUTPATIENT)
Dept: PHYSICAL THERAPY | Facility: REHABILITATION | Age: 77
End: 2022-01-12
Attending: PHYSICAL MEDICINE & REHABILITATION
Payer: MEDICARE

## 2022-01-12 DIAGNOSIS — I63.9 CEREBRAL INFARCTION, UNSPECIFIED MECHANISM (HCC): ICD-10-CM

## 2022-01-12 PROCEDURE — 97110 THERAPEUTIC EXERCISES: CPT

## 2022-01-12 NOTE — OP THERAPY DAILY TREATMENT
"  Outpatient Physical Therapy  DAILY TREATMENT     [facility] Physical Therapy [address].  [address]  [place] [address]  Phone:  [phone]  Fax:  [phone]    Date: 01/12/2022    Patient: Alfredo Caballero  YOB: 1945  MRN: [id number]     Time Calculation    Start time: 0946  Stop time: 1026 Time Calculation (min): 40 minutes         Chief Complaint: Loss Of Balance    Visit #: 7    SUBJECTIVE:  Doing well; reports working hard on exercises between visits.     OBJECTIVE:  Current objective measures:          Therapeutic Exercises (CPT 29588):     1. Treadmill gait, 0.19 miles, 0    2. Side shuffle in minisquat, 0    3. Alt tap to 2x4 wood, 0    4. 6\" step down (4\" step plus airex), x15 with occasional UE support    5. Eyes closed toe tap to ball, x10 B, occasional LOB with self-Recovery    9. Shuttle leg press, 6 cords BLEs x20, 4 cords SL x20B, 3 cords DL jump x20, pt felt as though he \"really worked my legs\"    10. Lunge at round top BOSU, step around bosu, x1 around R and L LE    11. Trampoline ball toss 4lbs, standing on air ex tandem [id number]    12. Star-pattern toe taps, 30sec B, no UE support, no LOB      Time-based treatments/modalities:    Physical Therapy Timed Treatment Charges  Therapeutic exercise minutes (CPT 05910): 40 minutes        ASSESSMENT:   Response to treatment: Pt appears to be most challenged with decreased base of support as well as with variation in surface types. He will benefit from further intervention to improve recovery ability during high level balance training.     PLAN/RECOMMENDATIONS:   Plan for treatment: therapy treatment to continue next visit.  Planned interventions for next visit: continue with current treatment.       "

## 2022-01-13 ENCOUNTER — OCCUPATIONAL THERAPY (OUTPATIENT)
Dept: OCCUPATIONAL THERAPY | Facility: REHABILITATION | Age: 77
End: 2022-01-13
Attending: NURSE PRACTITIONER
Payer: MEDICARE

## 2022-01-13 DIAGNOSIS — I69.30 LATE EFFECT OF STROKE: ICD-10-CM

## 2022-01-13 PROCEDURE — 97750 PHYSICAL PERFORMANCE TEST: CPT

## 2022-01-13 ASSESSMENT — BALANCE ASSESSMENTS
BALANCE - STANDING DYNAMIC: GOOD
BALANCE - STANDING STATIC: GOOD
BALANCE - SITTING STATIC: NORMAL
BALANCE - SITTING DYNAMIC: NORMAL
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
## 2022-01-19 ENCOUNTER — PHYSICAL THERAPY (OUTPATIENT)
Dept: PHYSICAL THERAPY | Facility: REHABILITATION | Age: 77
End: 2022-01-19
Attending: PHYSICAL MEDICINE & REHABILITATION
Payer: MEDICARE

## 2022-01-19 DIAGNOSIS — I63.9 CEREBRAL INFARCTION, UNSPECIFIED MECHANISM (HCC): ICD-10-CM

## 2022-01-19 PROCEDURE — 97110 THERAPEUTIC EXERCISES: CPT

## 2022-01-19 PROCEDURE — 97112 NEUROMUSCULAR REEDUCATION: CPT

## 2022-01-19 NOTE — OP THERAPY DAILY TREATMENT
"  Outpatient Physical Therapy  DAILY TREATMENT     [facility] Physical [address].  [address]  [place] [address]  Phone:  [phone]  Fax:  [phone]    Date: 01/19/2022    Patient: Alfredo Caballero  YOB: 1945  MRN: [id number]     Time Calculation    Start time: 0800  Stop time: 0845 Time Calculation (min): 45 minutes         Chief Complaint: Loss Of Balance    Visit #: 8    SUBJECTIVE:  Pt continues to work on strength and fine motor of hands at home daily.  States balance is still impaired, although he doesn't have a baseline of his balance function prior to stroke.  Pt has been cleared to return to work on 1/31.  States that he feels ready to return.  States he can ask for help whenever he needs it.  Can not identify anything he feels will be ashley challenging or difficult.    OBJECTIVE:        Therapeutic Exercises (CPT 88009):     1. Elliptical, ramp 10, resistance 4, x4 min alt 1 min forward/reverse    2. Monster scoops on disc glider, x10B    3. Alt tap to 2\" step, quick taps, x20    4. 5# wand in narrow stance, chest press x10, forward circles x10, backward circles x10    6. STS, x5 EO, x5 EC    7. Standing incline calf stretch, x30 sec B, x30 sec single    9. Shuttle leg press, 7 cords BLEs x15, 6 cords SL x15B, 3 cords DL jump x15    10. Lunge at round top BOSU, alt R/L x10    Therapeutic Treatments and Modalities:     1. Neuromuscular Re-education (CPT 02627)    Therapeutic Treatment and Modalities Summary: 11346:  -Obstacle course:: step up to Airex, 1/2 FR, 4\" step, step over round bolster, circles around cones, step up to folded foam mats x2 with SBA.  -static standing balance on 1/2 FR x30 sec, with head turns required UE support.  -tandem stance on Airex x30 sec B, with head turns required intermittent UE support.  -hop over tape on floor lateral to single leg 10 feet B.  Single hop zig zag 2 [id number]    Time-based treatments/modalities:    Physical Therapy " Timed Treatment Charges  Neuromusc re-ed, balance, coor, post minutes (CPT 39386): 20 minutes  Therapeutic exercise minutes (CPT 92315): 25 minutes    ASSESSMENT:   Response to treatment: Pt reports B knee and R hip pain with today's therex.  States these are from previous injuries.  Pt demo fair balance reactions.  Challenged by dynamic standing activities on uneven surfaces or in narrow stance.    PLAN/RECOMMENDATIONS:   Plan for treatment: therapy treatment to continue next visit.  Planned interventions for next visit: continue with current treatment.  Continue functional balance training.

## 2022-01-20 ENCOUNTER — PHYSICAL THERAPY (OUTPATIENT)
Dept: PHYSICAL THERAPY | Facility: REHABILITATION | Age: 77
End: 2022-01-20
Attending: PHYSICAL MEDICINE & REHABILITATION
Payer: MEDICARE

## 2022-01-20 DIAGNOSIS — I63.9 CEREBRAL INFARCTION, UNSPECIFIED MECHANISM (HCC): ICD-10-CM

## 2022-01-20 PROCEDURE — 97110 THERAPEUTIC EXERCISES: CPT

## 2022-01-20 PROCEDURE — 97112 NEUROMUSCULAR REEDUCATION: CPT

## 2022-01-20 NOTE — OP THERAPY DAILY TREATMENT
Outpatient Physical Therapy  DAILY TREATMENT     [facility] Physical [address]  [address]  [place] [address]  Phone:  [phone]  Fax:  [phone]    Date: 01/20/2022    Patient: Alfredo Caballero  YOB: 1945  MRN: [id number]     Time Calculation    Start time: 0803  Stop time: 0845 Time Calculation (min): 42 minutes         Chief Complaint: Loss Of Balance    Visit #: 9    SUBJECTIVE:  Pt reports he is feeling well.  Nothing new to report.    OBJECTIVE:        Therapeutic Exercises (CPT 16263):     1. NuStep, level 5, x5 min, 75 SPM pacekeeper    3. Squat lift, 5# x15    5. Bent over rows, 5# x15B    6. Bicep curls, 5#, x15B    7. Standing incline calf stretch, x30 sec B, x30 sec single    10. Standing hip circles on disc glider, x10B CW/CCW    11. Bent over chest flye, 5# x15B    Therapeutic Treatments and Modalities:     1. Neuromuscular Re-education (CPT 88896)    Therapeutic Treatment and Modalities Summary: 44910:  -alt tap to dynadisc x15.  -modifed tandem balance with arms outstretched trunk rotation x8B.  Difficulty with R foot back, required staggered stance and occasional stepping strategy.  -modified SLS TKE with opp foot on dynadisc x15B.   -modified SLS with opp foot on flat top 1/2 FR: static balance x30 sec B, PF/DF x15B, hip flex march from dynadisc x15B  -SLS with opp taps at 12, 3, 6 x10B.  -standing on large rockerboard: static balance in PF/DF, stride stance B, and lateral x30 sec all, with light single UE support.  Weight shift in all four positions, x10 all with light single to B UE support at //bars.  -steps in place on cobblefoam 2x 15.  -heel-toe swing through to 1/2 FR x10B.  Difficulty with R SL stance for L swing.      Time-based treatments/modalities:    Physical Therapy Timed Treatment Charges  Neuromusc re-ed, balance, coor, post minutes (CPT 87860): 30 minutes  Therapeutic exercise minutes (CPT 72116): 12 minutes    ASSESSMENT:   Response  to treatment: Difficulty with R SL stance and balance due to multiple previous RLE injuries.  Pt demo good balance strategies, requires no assist for balance recovery.       PLAN/RECOMMENDATIONS:   Plan for treatment: therapy treatment to continue next visit. Anticipate DC next visit.  Pt to return to work the following week.  Planned interventions for next visit: continue with current treatment.  Dynamic balance training.  Re-assess mini-BEST.

## 2022-01-24 ENCOUNTER — PHYSICAL THERAPY (OUTPATIENT)
Dept: PHYSICAL THERAPY | Facility: REHABILITATION | Age: 77
End: 2022-01-24
Attending: PHYSICAL MEDICINE & REHABILITATION
Payer: MEDICARE

## 2022-01-24 DIAGNOSIS — I63.9 CEREBRAL INFARCTION, UNSPECIFIED MECHANISM (HCC): ICD-10-CM

## 2022-01-24 PROCEDURE — 97530 THERAPEUTIC ACTIVITIES: CPT

## 2022-01-24 PROCEDURE — 97112 NEUROMUSCULAR REEDUCATION: CPT

## 2022-01-24 NOTE — OP THERAPY DISCHARGE SUMMARY
Outpatient Physical Therapy  DISCHARGE SUMMARY NOTE      [facility] Physical Therapy [place] [address]  [address]  [place] [address]  Phone:  [phone]  Fax:  [phone]    Date of Visit: 01/24/2022    Patient: Alfredo Caballero  YOB: 1945  MRN: [id number]     Referring Provider: Temi Travis M.D.  [address]  [place] [address]   Referring Diagnosis Cerebral infarction, unspecified [I63.9]     Functional Assessment Used  Mini best Test - 26  Anticipatory - 6  Reactive - 4  Sensory - 6  Dynamic Gait - 10    Your patient is being discharged from Physical Therapy with the following comments:   · Goals met    Comments:  D/c to ABDIFATAH Suresh, PT    Date: 1/24/2022

## 2022-01-24 NOTE — OP THERAPY DAILY TREATMENT
Outpatient Physical Therapy  DAILY TREATMENT     [facility] Physical [address]  [address]  [place] [address]  Phone:  [phone]  Fax:  [phone]    Date: 01/24/2022    Patient: Alfredo Caballero  YOB: 1945  MRN: [id number]     Time Calculation    Start time: 0730  Stop time: 0815 Time Calculation (min): 45 minutes     Chief Complaint: No chief complaint on file.    Visit #: 10    SUBJECTIVE:  Pt reports he is feeling well.  Nothing new to report.    OBJECTIVE:        Therapeutic Exercises (CPT 84172):     1. NuStep, level 5, x5 min, 75 SPM pacekeeper    3. Squat lift, 0    5. Bent over rows, 0    6. Bicep curls, 0    7. Standing incline calf stretch, 0    10. Standing hip circles on disc glider, 0    11. Bent over chest flye, 0    Therapeutic Treatments and Modalities:     1. Neuromuscular Re-education (CPT 57901)    Therapeutic Treatment and Modalities Summary: 05945:  -alt tap to dynadisc x15.  -tandem walks forward an back, required staggered stance and occasional stepping strategy.  -----added reactive balance with unexpected change of direction  -lateral walks cuing hip IR on B LE of CKC leg.   -modified SLS with opp foot on flat top 1/2 FR: static balance x30 sec B, PF/DF x15B, hip flex march from dynadisc x15B  -SLS with opp taps at 12, 3, 6 x10B.  -crossover stepping  -----added reactive balance component  -heel-toe swing through to 1/2 FR x10B.  Difficulty with R SL stance for L swing.    Minibest testing       Time-based treatments/modalities:    Physical Therapy Timed Treatment Charges  Neuromusc re-ed, balance, coor, post minutes (CPT 92183): 30 minutes  Therapeutic activity minutes (CPT 76938): 15 minutes    ASSESSMENT:   Response to treatment: Difficulty with R SL stance and balance due to multiple previous RLE injuries.  Pt demo good balance strategies, requires no assist for balance recovery.       PLAN/RECOMMENDATIONS:   Discharge to [facility]

## 2022-07-18 SDOH — HEALTH STABILITY: PHYSICAL HEALTH: ON AVERAGE, HOW MANY DAYS PER WEEK DO YOU ENGAGE IN MODERATE TO STRENUOUS EXERCISE (LIKE A BRISK WALK)?: 4 DAYS

## 2022-07-18 SDOH — HEALTH STABILITY: MENTAL HEALTH
STRESS IS WHEN SOMEONE FEELS TENSE, NERVOUS, ANXIOUS, OR CAN'T SLEEP AT NIGHT BECAUSE THEIR MIND IS TROUBLED. HOW STRESSED ARE YOU?: ONLY A LITTLE

## 2022-07-18 SDOH — ECONOMIC STABILITY: TRANSPORTATION INSECURITY
IN THE PAST 12 MONTHS, HAS THE LACK OF TRANSPORTATION KEPT YOU FROM MEDICAL APPOINTMENTS OR FROM GETTING MEDICATIONS?: PATIENT DECLINED

## 2022-07-18 SDOH — ECONOMIC STABILITY: HOUSING INSECURITY
IN THE LAST 12 MONTHS, WAS THERE A TIME WHEN YOU DID NOT HAVE A STEADY PLACE TO SLEEP OR SLEPT IN A SHELTER (INCLUDING NOW)?: PATIENT REFUSED

## 2022-07-18 SDOH — ECONOMIC STABILITY: HOUSING INSECURITY: IN THE LAST 12 MONTHS, HOW MANY PLACES HAVE YOU LIVED?: 1

## 2022-07-18 SDOH — ECONOMIC STABILITY: TRANSPORTATION INSECURITY
IN THE PAST 12 MONTHS, HAS LACK OF TRANSPORTATION KEPT YOU FROM MEETINGS, WORK, OR FROM GETTING THINGS NEEDED FOR DAILY LIVING?: PATIENT DECLINED

## 2022-07-18 SDOH — ECONOMIC STABILITY: INCOME INSECURITY: HOW HARD IS IT FOR YOU TO PAY FOR THE VERY BASICS LIKE FOOD, HOUSING, MEDICAL CARE, AND HEATING?: NOT HARD AT ALL

## 2022-07-18 SDOH — ECONOMIC STABILITY: INCOME INSECURITY: IN THE LAST 12 MONTHS, WAS THERE A TIME WHEN YOU WERE NOT ABLE TO PAY THE MORTGAGE OR RENT ON TIME?: PATIENT REFUSED

## 2022-07-18 SDOH — HEALTH STABILITY: PHYSICAL HEALTH: ON AVERAGE, HOW MANY MINUTES DO YOU ENGAGE IN EXERCISE AT THIS LEVEL?: 40 MIN

## 2022-07-18 SDOH — ECONOMIC STABILITY: FOOD INSECURITY: WITHIN THE PAST 12 MONTHS, YOU WORRIED THAT YOUR FOOD WOULD RUN OUT BEFORE YOU GOT MONEY TO BUY MORE.: PATIENT DECLINED

## 2022-07-18 SDOH — ECONOMIC STABILITY: TRANSPORTATION INSECURITY
IN THE PAST 12 MONTHS, HAS LACK OF RELIABLE TRANSPORTATION KEPT YOU FROM MEDICAL APPOINTMENTS, MEETINGS, WORK OR FROM GETTING THINGS NEEDED FOR DAILY LIVING?: PATIENT DECLINED

## 2022-07-18 SDOH — ECONOMIC STABILITY: FOOD INSECURITY: WITHIN THE PAST 12 MONTHS, THE FOOD YOU BOUGHT JUST DIDN'T LAST AND YOU DIDN'T HAVE MONEY TO GET MORE.: PATIENT DECLINED

## 2022-07-18 ASSESSMENT — SOCIAL DETERMINANTS OF HEALTH (SDOH)
HOW OFTEN DO YOU ATTEND CHURCH OR RELIGIOUS SERVICES?: PATIENT DECLINED
HOW OFTEN DO YOU GET TOGETHER WITH FRIENDS OR RELATIVES?: PATIENT DECLINED
DO YOU BELONG TO ANY CLUBS OR ORGANIZATIONS SUCH AS CHURCH GROUPS UNIONS, FRATERNAL OR ATHLETIC GROUPS, OR SCHOOL GROUPS?: NO
HOW HARD IS IT FOR YOU TO PAY FOR THE VERY BASICS LIKE FOOD, HOUSING, MEDICAL CARE, AND HEATING?: NOT HARD AT ALL
HOW OFTEN DO YOU HAVE A DRINK CONTAINING ALCOHOL: 2-3 TIMES A WEEK
IN A TYPICAL WEEK, HOW MANY TIMES DO YOU TALK ON THE PHONE WITH FAMILY, FRIENDS, OR NEIGHBORS?: PATIENT DECLINED
HOW OFTEN DO YOU GET TOGETHER WITH FRIENDS OR RELATIVES?: PATIENT DECLINED
IN A TYPICAL WEEK, HOW MANY TIMES DO YOU TALK ON THE PHONE WITH FAMILY, FRIENDS, OR NEIGHBORS?: PATIENT DECLINED
WITHIN THE PAST 12 MONTHS, YOU WORRIED THAT YOUR FOOD WOULD RUN OUT BEFORE YOU GOT THE MONEY TO BUY MORE: PATIENT DECLINED
HOW OFTEN DO YOU ATTEND CHURCH OR RELIGIOUS SERVICES?: PATIENT DECLINED
DO YOU BELONG TO ANY CLUBS OR ORGANIZATIONS SUCH AS CHURCH GROUPS UNIONS, FRATERNAL OR ATHLETIC GROUPS, OR SCHOOL GROUPS?: NO
HOW OFTEN DO YOU HAVE SIX OR MORE DRINKS ON ONE OCCASION: NEVER
HOW MANY DRINKS CONTAINING ALCOHOL DO YOU HAVE ON A TYPICAL DAY WHEN YOU ARE DRINKING: 1 OR 2

## 2022-07-18 ASSESSMENT — LIFESTYLE VARIABLES
AUDIT-C TOTAL SCORE: 3
HOW OFTEN DO YOU HAVE SIX OR MORE DRINKS ON ONE OCCASION: NEVER
HOW MANY STANDARD DRINKS CONTAINING ALCOHOL DO YOU HAVE ON A TYPICAL DAY: 1 OR 2
SKIP TO QUESTIONS 9-10: 1
HOW OFTEN DO YOU HAVE A DRINK CONTAINING ALCOHOL: 2-3 TIMES A WEEK

## 2022-07-21 ENCOUNTER — OFFICE VISIT (OUTPATIENT)
Dept: MEDICAL GROUP | Facility: MEDICAL CENTER | Age: 77
End: 2022-07-21
Payer: MEDICARE

## 2022-07-21 VITALS
TEMPERATURE: 98.2 F | HEIGHT: 68 IN | BODY MASS INDEX: 29.98 KG/M2 | HEART RATE: 81 BPM | SYSTOLIC BLOOD PRESSURE: 100 MMHG | RESPIRATION RATE: 14 BRPM | DIASTOLIC BLOOD PRESSURE: 58 MMHG | WEIGHT: 197.8 LBS | OXYGEN SATURATION: 94 %

## 2022-07-21 DIAGNOSIS — I10 ESSENTIAL HYPERTENSION: ICD-10-CM

## 2022-07-21 DIAGNOSIS — I63.9 ACUTE CVA (CEREBROVASCULAR ACCIDENT) (HCC): ICD-10-CM

## 2022-07-21 DIAGNOSIS — R73.9 BLOOD GLUCOSE ELEVATED: ICD-10-CM

## 2022-07-21 DIAGNOSIS — E78.2 MIXED HYPERLIPIDEMIA: ICD-10-CM

## 2022-07-21 DIAGNOSIS — I25.10 CORONARY ARTERY DISEASE INVOLVING NATIVE CORONARY ARTERY OF NATIVE HEART WITHOUT ANGINA PECTORIS: ICD-10-CM

## 2022-07-21 DIAGNOSIS — I63.9 ISCHEMIC STROKE (HCC): ICD-10-CM

## 2022-07-21 PROBLEM — E78.00 PURE HYPERCHOLESTEROLEMIA: Status: RESOLVED | Noted: 2017-12-12 | Resolved: 2022-07-21

## 2022-07-21 PROCEDURE — 99204 OFFICE O/P NEW MOD 45 MIN: CPT | Performed by: STUDENT IN AN ORGANIZED HEALTH CARE EDUCATION/TRAINING PROGRAM

## 2022-07-21 RX ORDER — ATORVASTATIN CALCIUM 80 MG/1
80 TABLET, FILM COATED ORAL EVERY EVENING
Qty: 90 TABLET | Refills: 2 | Status: SHIPPED | OUTPATIENT
Start: 2022-07-21 | End: 2023-04-18

## 2022-07-21 RX ORDER — LOSARTAN POTASSIUM 50 MG/1
50 TABLET ORAL DAILY
Qty: 90 TABLET | Refills: 2 | Status: SHIPPED | OUTPATIENT
Start: 2022-07-21 | End: 2023-04-18

## 2022-07-21 ASSESSMENT — ENCOUNTER SYMPTOMS
CHILLS: 0
SHORTNESS OF BREATH: 0
FEVER: 0

## 2022-07-21 ASSESSMENT — PATIENT HEALTH QUESTIONNAIRE - PHQ9: CLINICAL INTERPRETATION OF PHQ2 SCORE: 0

## 2022-07-21 ASSESSMENT — FIBROSIS 4 INDEX: FIB4 SCORE: 1.76

## 2022-07-21 NOTE — PROGRESS NOTES
Subjective:     CC: Transferring Care    HPI:   Alexey presents today for the following;    Problem   Mixed Hyperlipidemia    Patient currently on atorvastatin 80mg. Statin started after his stroke     Lab Results   Component Value Date/Time    CHOLSTRLTOT 221 (H) 10/13/2021 01:45 AM     (H) 10/13/2021 01:45 AM    HDL 59 10/13/2021 01:45 AM    TRIGLYCERIDE 84 10/13/2021 01:45 AM       Lab Results   Component Value Date/Time    SODIUM 139 10/17/2021 06:25 AM    POTASSIUM 4.0 10/17/2021 06:25 AM    CHLORIDE 106 10/17/2021 06:25 AM    CO2 25 10/17/2021 06:25 AM    GLUCOSE 100 (H) 10/17/2021 06:25 AM    BUN 15 10/17/2021 06:25 AM    CREATININE 0.88 10/17/2021 06:25 AM            Ischemic Stroke (Hcc)    Occurred in October 2021, is doing relatively well. He has still improving on writing and has some difficulty speaking occasionally but much improved. The source of the stroke was from his carotid arteries.     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 90 Tablet 2   • losartan (COZAAR) 50 MG Tab Take 1 Tablet by mouth every day. 90 Tablet 2   • aspirin (ASA) 81 MG Chew Tab chewable tablet Chew 1 Tablet every day. 100 Tablet 2   • acetaminophen (TYLENOL) 325 MG Tab Take 2 Tablets by mouth every 6 hours as needed.     • GARLIC PO Take 1 Capsule by mouth every day.     • Omega-3 Fatty Acids (FISH OIL PO) Take 1 Capsule by mouth every day.     •  "Cholecalciferol (VITAMIN D) 2000 UNIT Tab Take 2,000 Units by mouth every 7 days.       No current [facility]-ordered facility-administered medications on file.           ROS:  Review of Systems   Constitutional: Negative for chills and fever.   Respiratory: Negative for shortness of breath.    Cardiovascular: Negative for chest pain.       Objective:     Exam:  /58   Pulse 81   Temp 36.8 °C (98.2 °F) (Temporal)   Resp 14   Ht 1.727 m (5' 8\")   Wt 89.7 kg (197 lb 12.8 oz)   SpO2 94%   BMI 30.08 kg/m²  Body mass index is 30.08 kg/m².    Physical Exam  Constitutional:       General: He is not in acute distress.     Appearance: He is not ill-appearing.   Pulmonary:      Effort: Pulmonary effort is normal.   Neurological:      Mental Status: He is alert.   Psychiatric:         Mood and Affect: Mood normal.         Behavior: Behavior normal.         Thought Content: Thought content normal.         Judgment: Judgment normal.               Assessment & Plan:     Problem List Items Addressed This Visit     Coronary artery disease involving native coronary artery of native heart without angina pectoris     Chronic  -currently on lipitor 80mg, asa 81, and losartan 50mg           Relevant Medications    atorvastatin (LIPITOR) 80 MG tablet    losartan (COZAAR) 50 MG Tab    Essential hypertension     Chronic-stable  -continue losartan 50mg           Relevant Medications    atorvastatin (LIPITOR) 80 MG tablet    losartan (COZAAR) 50 MG Tab    Ischemic stroke (HCC)     Chronic-improved  -continue asa 81mg and atorvastatin 80mg            Relevant Medications    atorvastatin (LIPITOR) 80 MG tablet    losartan (COZAAR) 50 MG Tab    Mixed hyperlipidemia     Chronic  -continue atorvastatin 80mg             Relevant Medications    atorvastatin (LIPITOR) 80 MG tablet    losartan (COZAAR) 50 MG Tab    Other Relevant Orders    CBC WITH DIFFERENTIAL    TSH WITH REFLEX TO FT4    Lipid Profile      Other Visit Diagnoses     Blood " glucose elevated        Relevant Orders    HEMOGLOBIN A1C    Comp Metabolic Panel    Acute CVA (cerebrovascular accident) (HCC)        Relevant Medications    atorvastatin (LIPITOR) 80 MG tablet    losartan (COZAAR) 50 MG Tab              Return in about 3 months (around 10/21/2022) for Medicare wellness .    Please note that this dictation was created using voice recognition software. I have made every reasonable attempt to correct obvious errors, but I expect that there are errors of grammar and possibly content that I did not discover before finalizing the note.

## 2022-07-22 ENCOUNTER — HOSPITAL ENCOUNTER (OUTPATIENT)
Dept: LAB | Facility: MEDICAL CENTER | Age: 77
End: 2022-07-22
Attending: STUDENT IN AN ORGANIZED HEALTH CARE EDUCATION/TRAINING PROGRAM
Payer: MEDICARE

## 2022-07-22 DIAGNOSIS — E78.2 MIXED HYPERLIPIDEMIA: ICD-10-CM

## 2022-07-22 DIAGNOSIS — R73.9 BLOOD GLUCOSE ELEVATED: ICD-10-CM

## 2022-07-22 LAB
ALBUMIN SERPL BCP-MCNC: 3.9 G/DL (ref 3.2–4.9)
ALBUMIN/GLOB SERPL: 1.3 G/DL
ALP SERPL-CCNC: 62 U/L (ref 30–99)
ALT SERPL-CCNC: 18 U/L (ref 2–50)
ANION GAP SERPL CALC-SCNC: 11 MMOL/L (ref 7–16)
AST SERPL-CCNC: 19 U/L (ref 12–45)
BASOPHILS # BLD AUTO: 0.5 % (ref 0–1.8)
BASOPHILS # BLD: 0.05 K/UL (ref 0–0.12)
BILIRUB SERPL-MCNC: 1.5 MG/DL (ref 0.1–1.5)
BUN SERPL-MCNC: 16 MG/DL (ref 8–22)
CALCIUM SERPL-MCNC: 9 MG/DL (ref 8.4–10.2)
CHLORIDE SERPL-SCNC: 104 MMOL/L (ref 96–112)
CHOLEST SERPL-MCNC: 122 MG/DL (ref 100–199)
CO2 SERPL-SCNC: 24 MMOL/L (ref 20–33)
CREAT SERPL-MCNC: 0.85 MG/DL (ref 0.5–1.4)
EOSINOPHIL # BLD AUTO: 0.09 K/UL (ref 0–0.51)
EOSINOPHIL NFR BLD: 0.8 % (ref 0–6.9)
ERYTHROCYTE [DISTWIDTH] IN BLOOD BY AUTOMATED COUNT: 49.2 FL (ref 35.9–50)
EST. AVERAGE GLUCOSE BLD GHB EST-MCNC: 117 MG/DL
FASTING STATUS PATIENT QL REPORTED: NORMAL
GFR SERPLBLD CREATININE-BSD FMLA CKD-EPI: 88 ML/MIN/1.73 M 2
GLOBULIN SER CALC-MCNC: 3.1 G/DL (ref 1.9–3.5)
GLUCOSE SERPL-MCNC: 101 MG/DL (ref 65–99)
HBA1C MFR BLD: 5.7 % (ref 4–5.6)
HCT VFR BLD AUTO: 42.5 % (ref 42–52)
HDLC SERPL-MCNC: 64 MG/DL
HGB BLD-MCNC: 14 G/DL (ref 14–18)
IMM GRANULOCYTES # BLD AUTO: 0.03 K/UL (ref 0–0.11)
IMM GRANULOCYTES NFR BLD AUTO: 0.3 % (ref 0–0.9)
LDLC SERPL CALC-MCNC: 50 MG/DL
LYMPHOCYTES # BLD AUTO: 2.19 K/UL (ref 1–4.8)
LYMPHOCYTES NFR BLD: 20.6 % (ref 22–41)
MCH RBC QN AUTO: 32 PG (ref 27–33)
MCHC RBC AUTO-ENTMCNC: 32.9 G/DL (ref 33.7–35.3)
MCV RBC AUTO: 97.3 FL (ref 81.4–97.8)
MONOCYTES # BLD AUTO: 0.74 K/UL (ref 0–0.85)
MONOCYTES NFR BLD AUTO: 6.9 % (ref 0–13.4)
NEUTROPHILS # BLD AUTO: 7.55 K/UL (ref 1.82–7.42)
NEUTROPHILS NFR BLD: 70.9 % (ref 44–72)
NRBC # BLD AUTO: 0 K/UL
NRBC BLD-RTO: 0 /100 WBC
PLATELET # BLD AUTO: 197 K/UL (ref 164–446)
PMV BLD AUTO: 13.6 FL (ref 9–12.9)
POTASSIUM SERPL-SCNC: 4.2 MMOL/L (ref 3.6–5.5)
PROT SERPL-MCNC: 7 G/DL (ref 6–8.2)
RBC # BLD AUTO: 4.37 M/UL (ref 4.7–6.1)
SODIUM SERPL-SCNC: 139 MMOL/L (ref 135–145)
TRIGL SERPL-MCNC: 41 MG/DL (ref 0–149)
TSH SERPL DL<=0.005 MIU/L-ACNC: 4.38 UIU/ML (ref 0.38–5.33)
WBC # BLD AUTO: 10.7 K/UL (ref 4.8–10.8)

## 2022-07-22 PROCEDURE — 36415 COLL VENOUS BLD VENIPUNCTURE: CPT

## 2022-07-22 PROCEDURE — 80053 COMPREHEN METABOLIC PANEL: CPT

## 2022-07-22 PROCEDURE — 85025 COMPLETE CBC W/AUTO DIFF WBC: CPT

## 2022-07-22 PROCEDURE — 80061 LIPID PANEL: CPT

## 2022-07-22 PROCEDURE — 84443 ASSAY THYROID STIM HORMONE: CPT

## 2022-07-22 PROCEDURE — 83036 HEMOGLOBIN GLYCOSYLATED A1C: CPT | Mod: GA

## 2022-08-08 NOTE — ED NOTES
Med Rec completed: per pt at bedside with son present.      No ORAL antibiotics in last 30 days    Preferred Pharmacy: [facility]     Pt confirmed following allergies:  Allergies   Allergen Reactions   • Tape Rash     Pt requests only paper tape as hypoallergenic tape gives him a severe rash.       Pt's home medications:   Medication Sig   • GARLIC PO Take 1 Capsule by mouth every day.   • Omega-3 Fatty Acids (FISH OIL PO) Take 1 Capsule by mouth every day.   • Cholecalciferol (VITAMIN D) 2000 UNIT Tab Take 2,000 Units by mouth every 7 days.   • therapeutic multivitamin-minerals (THERAGRAN-M) Tab Take 1 Tab by mouth every day.   • aspirin (ASA) 81 MG Chew Tab chewable tablet Chew 81 mg every Monday, Wednesday, and Friday.                None

## 2022-10-21 ENCOUNTER — APPOINTMENT (OUTPATIENT)
Dept: MEDICAL GROUP | Facility: MEDICAL CENTER | Age: 77
End: 2022-10-21
Payer: COMMERCIAL

## 2022-11-08 ENCOUNTER — PATIENT MESSAGE (OUTPATIENT)
Dept: HEALTH INFORMATION MANAGEMENT | Facility: OTHER | Age: 77
End: 2022-11-08

## 2022-11-18 ENCOUNTER — OFFICE VISIT (OUTPATIENT)
Dept: MEDICAL GROUP | Facility: MEDICAL CENTER | Age: 77
End: 2022-11-18
Payer: MEDICARE

## 2022-11-18 VITALS
WEIGHT: 197 LBS | TEMPERATURE: 97.2 F | HEIGHT: 68 IN | HEART RATE: 65 BPM | DIASTOLIC BLOOD PRESSURE: 78 MMHG | OXYGEN SATURATION: 96 % | SYSTOLIC BLOOD PRESSURE: 134 MMHG | BODY MASS INDEX: 29.86 KG/M2

## 2022-11-18 DIAGNOSIS — Z12.11 COLON CANCER SCREENING: ICD-10-CM

## 2022-11-18 DIAGNOSIS — E78.2 MIXED HYPERLIPIDEMIA: ICD-10-CM

## 2022-11-18 DIAGNOSIS — R73.03 PREDIABETES: ICD-10-CM

## 2022-11-18 DIAGNOSIS — I10 ESSENTIAL HYPERTENSION: ICD-10-CM

## 2022-11-18 PROCEDURE — 99214 OFFICE O/P EST MOD 30 MIN: CPT | Performed by: STUDENT IN AN ORGANIZED HEALTH CARE EDUCATION/TRAINING PROGRAM

## 2022-11-18 ASSESSMENT — PATIENT HEALTH QUESTIONNAIRE - PHQ9: CLINICAL INTERPRETATION OF PHQ2 SCORE: 0

## 2022-11-18 ASSESSMENT — ENCOUNTER SYMPTOMS
CHILLS: 0
FEVER: 0
GENERAL WELL-BEING: GOOD
SHORTNESS OF BREATH: 0

## 2022-11-18 ASSESSMENT — ACTIVITIES OF DAILY LIVING (ADL): BATHING_REQUIRES_ASSISTANCE: 0

## 2022-11-18 ASSESSMENT — FIBROSIS 4 INDEX: FIB4 SCORE: 1.8

## 2022-11-18 NOTE — PROGRESS NOTES
"Subjective:     CC: Established Patient     HPI:   Alexey presents today for the following;    Problem   Prediabetes    A1c 5.7 in July 2022     Mixed Hyperlipidemia    Patient currently on atorvastatin 80mg. Statin started after his stroke     Lab Results   Component Value Date/Time    CHOLSTRLTOT 122 07/22/2022 11:04 AM    LDL 50 07/22/2022 11:04 AM    HDL 64 07/22/2022 11:04 AM    TRIGLYCERIDE 41 07/22/2022 11:04 AM                Essential Hypertension    Patient is on losartan 50mg, no headaches or dizziness          Current Outpatient Medications Ordered in Epic   Medication Sig Dispense Refill    atorvastatin (LIPITOR) 80 MG tablet Take 1 Tablet by mouth every evening. 90 Tablet 2    losartan (COZAAR) 50 MG Tab Take 1 Tablet by mouth every day. 90 Tablet 2    aspirin (ASA) 81 MG Chew Tab chewable tablet Chew 1 Tablet every day. 100 Tablet 2    acetaminophen (TYLENOL) 325 MG Tab Take 2 Tablets by mouth every 6 hours as needed.      GARLIC PO Take 1 Capsule by mouth every day.      Omega-3 Fatty Acids (FISH OIL PO) Take 1 Capsule by mouth every day.      Cholecalciferol (VITAMIN D) 2000 UNIT Tab Take 2,000 Units by mouth every 7 days.       No current [facility]-ordered facility-administered medications on file.           ROS:  Review of Systems   Constitutional:  Negative for chills and fever.   Respiratory:  Negative for shortness of breath.    Cardiovascular:  Negative for chest pain.     Objective:     Exam:  /78 (BP Location: Left arm, Patient Position: Sitting, BP Cuff Size: Adult)   Pulse 65   Temp 36.2 °C (97.2 °F)   Ht 1.727 m (5' 8\")   Wt 89.4 kg (197 lb)   SpO2 96%   BMI 29.95 kg/m²  Body mass index is 29.95 kg/m².    Physical Exam  Constitutional:       General: He is not in acute distress.     Appearance: He is not ill-appearing.   Cardiovascular:      Rate and Rhythm: Normal rate and regular rhythm.      Pulses: Normal pulses.   Pulmonary:      Effort: Pulmonary effort is normal. No " respiratory distress.      Breath sounds: No wheezing, rhonchi or rales.   Neurological:      Mental Status: He is alert.   Psychiatric:         Mood and Affect: Mood normal.         Behavior: Behavior normal.         Thought Content: Thought content normal.         Judgment: Judgment normal.             Assessment & Plan:     Problem List Items Addressed This Visit       Essential hypertension     Chronic-stable  -continue losartan 50mg          Mixed hyperlipidemia     Chronic  -continue atorvastatin 80mg          Prediabetes     Chronic  -patient is eating a low carb diet with lots of vegetables and lean meats  -he exercises 3x week with some resistence training, he resistant to changing his routine           Other Visit Diagnoses       Colon cancer screening        Relevant Orders    Referral to GI for Colonoscopy                Please note that this dictation was created using voice recognition software. I have made every reasonable attempt to correct obvious errors, but I expect that there are errors of grammar and possibly content that I did not discover before finalizing the note.

## 2022-11-18 NOTE — ASSESSMENT & PLAN NOTE
Chronic  -patient is eating a low carb diet with lots of vegetables and lean meats  -he exercises 3x week with some resistence training, he resistant to changing his routine

## 2023-04-16 ENCOUNTER — HOSPITAL ENCOUNTER (EMERGENCY)
Facility: MEDICAL CENTER | Age: 78
End: 2023-04-16
Attending: EMERGENCY MEDICINE
Payer: MEDICARE

## 2023-04-16 VITALS
HEIGHT: 68 IN | RESPIRATION RATE: 16 BRPM | TEMPERATURE: 97.8 F | DIASTOLIC BLOOD PRESSURE: 69 MMHG | SYSTOLIC BLOOD PRESSURE: 121 MMHG | BODY MASS INDEX: 31.24 KG/M2 | WEIGHT: 206.13 LBS | HEART RATE: 58 BPM | OXYGEN SATURATION: 96 %

## 2023-04-16 DIAGNOSIS — L03.114 CELLULITIS OF LEFT UPPER EXTREMITY: ICD-10-CM

## 2023-04-16 DIAGNOSIS — I63.9 ACUTE CVA (CEREBROVASCULAR ACCIDENT) (HCC): ICD-10-CM

## 2023-04-16 DIAGNOSIS — I10 ESSENTIAL HYPERTENSION: ICD-10-CM

## 2023-04-16 PROCEDURE — A9270 NON-COVERED ITEM OR SERVICE: HCPCS | Performed by: EMERGENCY MEDICINE

## 2023-04-16 PROCEDURE — 700102 HCHG RX REV CODE 250 W/ 637 OVERRIDE(OP): Performed by: EMERGENCY MEDICINE

## 2023-04-16 PROCEDURE — 99283 EMERGENCY DEPT VISIT LOW MDM: CPT

## 2023-04-16 RX ORDER — CEPHALEXIN 500 MG/1
500 CAPSULE ORAL 4 TIMES DAILY
Qty: 28 CAPSULE | Refills: 0 | Status: SHIPPED | OUTPATIENT
Start: 2023-04-16 | End: 2023-04-23

## 2023-04-16 RX ORDER — CEPHALEXIN 250 MG/1
500 CAPSULE ORAL ONCE
Status: COMPLETED | OUTPATIENT
Start: 2023-04-16 | End: 2023-04-16

## 2023-04-16 RX ADMIN — CEPHALEXIN 500 MG: 250 CAPSULE ORAL at 17:39

## 2023-04-16 ASSESSMENT — FIBROSIS 4 INDEX: FIB4 SCORE: 1.8

## 2023-04-16 NOTE — ED TRIAGE NOTES
"77 yr old male to triage  Chief Complaint   Patient presents with    Wound Infection     Patient report he hit his left arm on a door handle on Wednesday and kept it clean with neosporin but today notice increase redness and swelling.       BP (!) 155/102   Pulse 72   Temp 36.3 °C (97.3 °F) (Temporal)   Resp 16   Ht 1.727 m (5' 8\")   Wt 93.5 kg (206 lb 2.1 oz)   SpO2 94%   BMI 31.34 kg/m²     "

## 2023-04-17 NOTE — ED PROVIDER NOTES
ER Provider Note    Scribed for Zion Laws M.d. by Oleg Glez. 4/16/2023  5:18 PM    Primary Care Provider: Rajesh Morales D.O.    CHIEF COMPLAINT  Chief Complaint   Patient presents with    Wound Infection     Patient report he hit his left arm on a door handle on Wednesday and kept it clean with neosporin but today notice increase redness and swelling.       EXTERNAL RECORDS REVIEWED  Outpatient records show that the patient is followed closely by his primary care provider Rajesh Morales.    HPI/ROS  LIMITATION TO HISTORY   Select: : None  OUTSIDE HISTORIAN(S):  None    Alexey Caballero is a 77 y.o. male who presents to the ED complaining of left arm wound onset 5 days ago on Wednesday. Per patient, the patient had injured his arm with a door handle. He went home and treated his wound by cleaning it with peroxide and putting neosporin on it. Today, he noticed that his wound is increasing in redness and swelling. He has associated swelling and redness,  but denies any other significant symptoms. No alleviating or exacerbating factors reported. Denies any allergies to medications.      PAST MEDICAL HISTORY  Past Medical History:   Diagnosis Date    Allergy     Arthritis     Blood transfusion without reported diagnosis     Cancer (HCC)     Heart attack (HCC)        SURGICAL HISTORY  Past Surgical History:   Procedure Laterality Date    ARTHROPLASTY      COLON RESECTION      OPEN REDUCTION         FAMILY HISTORY  Family History   Problem Relation Age of Onset    Arthritis Father     Heart Disease Father     Hyperlipidemia Father        SOCIAL HISTORY   reports that he has quit smoking. He has never used smokeless tobacco. He reports current alcohol use. He reports that he does not use drugs.    CURRENT MEDICATIONS  Previous Medications    ACETAMINOPHEN (TYLENOL) 325 MG TAB    Take 2 Tablets by mouth every 6 hours as needed.    ASPIRIN (ASA) 81 MG CHEW TAB CHEWABLE TABLET    Chew 1 Tablet every day.  "   ATORVASTATIN (LIPITOR) 80 MG TABLET    Take 1 Tablet by mouth every evening.    CHOLECALCIFEROL (VITAMIN D) 2000 UNIT TAB    Take 2,000 Units by mouth every 7 days.    GARLIC PO    Take 1 Capsule by mouth every day.    LOSARTAN (COZAAR) 50 MG TAB    Take 1 Tablet by mouth every day.    OMEGA-3 FATTY ACIDS (FISH OIL PO)    Take 1 Capsule by mouth every day.       ALLERGIES  Tape and Statins [hmg-coa-r inhibitors]    PHYSICAL EXAM  BP (!) 155/102   Pulse 72   Temp 36.3 °C (97.3 °F) (Temporal)   Resp 16   Ht 1.727 m (5' 8\")   Wt 93.5 kg (206 lb 2.1 oz)   SpO2 94%   BMI 31.34 kg/m²   Pulse ox interpretation: I interpret this pulse ox as normal.  Constitutional: Alert in no apparent distress.  HENT: No signs of trauma, Bilateral external ears normal, Nose normal.   Eyes: Pupils are equal and reactive, Conjunctiva normal, Non-icteric.   Neck: Normal range of motion, Supple, No stridor.    Cardiovascular: Normal peripheral perfusion  Thorax & Lungs: Unlabored respirations, equal chest expansion, no accessory muscle use  Abdomen: Non-distended  Skin: Left forearm just below elbow shows a scab area that sustained the described injury with 2 or 3 cm perimeter, mild warmth erythema and swelling, No fluctuance, No rash.   Back: Normal alignment and ROM  Extremities: No gross deformity  Musculoskeletal: Good range of motion in all major joints.   Neurologic: Alert, Normal motor function, No focal deficits noted.   Psychiatric: Affect normal, Judgment normal, Mood normal.    COURSE & MEDICAL DECISION MAKING     ED Observation Status? No; Patient does not meet criteria for ED Observation.     INITIAL ASSESSMENT, COURSE AND PLAN  Care Narrative:     5:19 PM - Patient evaluated at bedside. I informed the patient that one way to gauge improvement is to make a pen line and see if his redness is increasing. I also informed him that simple soap and water will do for cleaning wounds like the one he has. I informed him of my " plan of care, which includes treating him with his first dose of oral antibiotics and to have him continue on those antibiotics after discharge. Patient verbalizes understanding and agreement to this plan of care. Patient had the opportunity to ask any questions. The plan for discharge was discussed with them and he was told to return for any new or worsening symptoms. He was also informed of the plans for follow up. Patient is understanding and agreeable to the plan for discharge.     ADDITIONAL PROBLEM LIST  None    DISPOSITION AND DISCUSSIONS  I have discussed management of the patient with the following physicians and JESUS's:  None    Discussion of management with other [facility] or appropriate source(s): None     Escalation of care considered, and ultimately not performed: blood analysis.    Decision tools and prescription drugs considered including, but not limited to: Antibiotics were prescribed at discharge .    The patient will return for new or worsening symptoms and is stable at the time of discharge.    The patient is referred to a primary physician for blood pressure management, diabetic screening, and for all other preventative health concerns.    DISPOSITION:  Patient will be discharged home in stable condition.    FOLLOW UP:  Rajesh Morales D.O.  [id number]  [facility] [address] [phone]      As needed    [facility], Emergency Dept  [address]  [facility] [address] [phone]    If symptoms worsen      OUTPATIENT MEDICATIONS:  New Prescriptions    CEPHALEXIN (KEFLEX) 500 MG CAP    Take 1 Capsule by mouth 4 times a day for 7 days.        FINAL DIANGOSIS  1. Cellulitis of left upper extremity         Oleg GARDINER), am scribing for, and in the presence of, Zion Laws M.D..    Electronically signed by: Oleg Mccann), 4/16/2023    Zion GARDINER M.D. personally performed the services described in this  documentation, as scribed by Oleg Glez in my presence, and it is both accurate and complete. C       The note accurately reflects work and decisions made by me.  Zion Laws M.D.  4/16/2023  5:33 PM

## 2023-04-17 NOTE — DISCHARGE INSTRUCTIONS
Please  your antibiotic prescription and take it as directed until all pills are gone, even if you feel better first.  Continue once a day soap and water washing and antibiotic ointment application to keep the scab moist.  This helps prevent further infection and improved healing/decreases scarring.

## 2023-04-17 NOTE — ED NOTES
Discharged to home with instructions to follow up with his doctor. Return here for worsening symptoms. Prescription sent to pharmacy.

## 2023-04-18 RX ORDER — LOSARTAN POTASSIUM 50 MG/1
50 TABLET ORAL DAILY
Qty: 90 TABLET | Refills: 2 | Status: SHIPPED | OUTPATIENT
Start: 2023-04-18 | End: 2024-01-09

## 2023-04-18 RX ORDER — ATORVASTATIN CALCIUM 80 MG/1
TABLET, FILM COATED ORAL
Qty: 90 TABLET | Refills: 2 | Status: SHIPPED | OUTPATIENT
Start: 2023-04-18 | End: 2024-01-09

## 2023-11-07 SDOH — ECONOMIC STABILITY: FOOD INSECURITY: WITHIN THE PAST 12 MONTHS, THE FOOD YOU BOUGHT JUST DIDN'T LAST AND YOU DIDN'T HAVE MONEY TO GET MORE.: NEVER TRUE

## 2023-11-07 SDOH — ECONOMIC STABILITY: INCOME INSECURITY: IN THE LAST 12 MONTHS, WAS THERE A TIME WHEN YOU WERE NOT ABLE TO PAY THE MORTGAGE OR RENT ON TIME?: PATIENT REFUSED

## 2023-11-07 SDOH — ECONOMIC STABILITY: HOUSING INSECURITY

## 2023-11-07 SDOH — HEALTH STABILITY: PHYSICAL HEALTH: ON AVERAGE, HOW MANY MINUTES DO YOU ENGAGE IN EXERCISE AT THIS LEVEL?: 30 MIN

## 2023-11-07 SDOH — ECONOMIC STABILITY: TRANSPORTATION INSECURITY
IN THE PAST 12 MONTHS, HAS THE LACK OF TRANSPORTATION KEPT YOU FROM MEDICAL APPOINTMENTS OR FROM GETTING MEDICATIONS?: NO

## 2023-11-07 SDOH — HEALTH STABILITY: PHYSICAL HEALTH: ON AVERAGE, HOW MANY DAYS PER WEEK DO YOU ENGAGE IN MODERATE TO STRENUOUS EXERCISE (LIKE A BRISK WALK)?: 5 DAYS

## 2023-11-07 SDOH — ECONOMIC STABILITY: FOOD INSECURITY: WITHIN THE PAST 12 MONTHS, YOU WORRIED THAT YOUR FOOD WOULD RUN OUT BEFORE YOU GOT MONEY TO BUY MORE.: NEVER TRUE

## 2023-11-07 SDOH — HEALTH STABILITY: MENTAL HEALTH
STRESS IS WHEN SOMEONE FEELS TENSE, NERVOUS, ANXIOUS, OR CAN'T SLEEP AT NIGHT BECAUSE THEIR MIND IS TROUBLED. HOW STRESSED ARE YOU?: NOT AT ALL

## 2023-11-07 ASSESSMENT — SOCIAL DETERMINANTS OF HEALTH (SDOH)
DO YOU BELONG TO ANY CLUBS OR ORGANIZATIONS SUCH AS CHURCH GROUPS UNIONS, FRATERNAL OR ATHLETIC GROUPS, OR SCHOOL GROUPS?: PATIENT DECLINED
HOW OFTEN DO YOU HAVE SIX OR MORE DRINKS ON ONE OCCASION: NEVER
HOW OFTEN DO YOU ATTEND CHURCH OR RELIGIOUS SERVICES?: PATIENT DECLINED
HOW OFTEN DO YOU GET TOGETHER WITH FRIENDS OR RELATIVES?: PATIENT DECLINED
HOW OFTEN DO YOU HAVE A DRINK CONTAINING ALCOHOL: 2-4 TIMES A MONTH
IN A TYPICAL WEEK, HOW MANY TIMES DO YOU TALK ON THE PHONE WITH FAMILY, FRIENDS, OR NEIGHBORS?: PATIENT DECLINED
HOW MANY DRINKS CONTAINING ALCOHOL DO YOU HAVE ON A TYPICAL DAY WHEN YOU ARE DRINKING: 1 OR 2
IN A TYPICAL WEEK, HOW MANY TIMES DO YOU TALK ON THE PHONE WITH FAMILY, FRIENDS, OR NEIGHBORS?: PATIENT DECLINED
WITHIN THE PAST 12 MONTHS, YOU WORRIED THAT YOUR FOOD WOULD RUN OUT BEFORE YOU GOT THE MONEY TO BUY MORE: NEVER TRUE
HOW OFTEN DO YOU ATTEND CHURCH OR RELIGIOUS SERVICES?: PATIENT DECLINED
HOW OFTEN DO YOU ATTENT MEETINGS OF THE CLUB OR ORGANIZATION YOU BELONG TO?: PATIENT DECLINED
DO YOU BELONG TO ANY CLUBS OR ORGANIZATIONS SUCH AS CHURCH GROUPS UNIONS, FRATERNAL OR ATHLETIC GROUPS, OR SCHOOL GROUPS?: PATIENT DECLINED
HOW OFTEN DO YOU ATTENT MEETINGS OF THE CLUB OR ORGANIZATION YOU BELONG TO?: PATIENT DECLINED
HOW OFTEN DO YOU GET TOGETHER WITH FRIENDS OR RELATIVES?: PATIENT DECLINED

## 2023-11-07 ASSESSMENT — LIFESTYLE VARIABLES
AUDIT-C TOTAL SCORE: 2
SKIP TO QUESTIONS 9-10: 1
HOW OFTEN DO YOU HAVE A DRINK CONTAINING ALCOHOL: 2-4 TIMES A MONTH
HOW OFTEN DO YOU HAVE SIX OR MORE DRINKS ON ONE OCCASION: NEVER
HOW MANY STANDARD DRINKS CONTAINING ALCOHOL DO YOU HAVE ON A TYPICAL DAY: 1 OR 2

## 2023-11-08 ENCOUNTER — OFFICE VISIT (OUTPATIENT)
Dept: MEDICAL GROUP | Facility: LAB | Age: 78
End: 2023-11-08
Payer: MEDICARE

## 2023-11-08 VITALS
RESPIRATION RATE: 16 BRPM | HEIGHT: 69 IN | WEIGHT: 209.2 LBS | OXYGEN SATURATION: 96 % | BODY MASS INDEX: 30.98 KG/M2 | DIASTOLIC BLOOD PRESSURE: 72 MMHG | SYSTOLIC BLOOD PRESSURE: 128 MMHG | HEART RATE: 64 BPM | TEMPERATURE: 97.2 F

## 2023-11-08 DIAGNOSIS — I63.9 ISCHEMIC STROKE (HCC): ICD-10-CM

## 2023-11-08 DIAGNOSIS — Z00.00 MEDICARE ANNUAL WELLNESS VISIT, SUBSEQUENT: ICD-10-CM

## 2023-11-08 DIAGNOSIS — E78.2 MIXED HYPERLIPIDEMIA: ICD-10-CM

## 2023-11-08 DIAGNOSIS — Z91.81 AT MODERATE RISK FOR FALL: ICD-10-CM

## 2023-11-08 DIAGNOSIS — R29.898 WEAKNESS OF LOWER EXTREMITY, UNSPECIFIED LATERALITY: ICD-10-CM

## 2023-11-08 DIAGNOSIS — Z12.83 SKIN CANCER SCREENING: ICD-10-CM

## 2023-11-08 DIAGNOSIS — R73.03 PREDIABETES: ICD-10-CM

## 2023-11-08 PROCEDURE — 3074F SYST BP LT 130 MM HG: CPT | Performed by: STUDENT IN AN ORGANIZED HEALTH CARE EDUCATION/TRAINING PROGRAM

## 2023-11-08 PROCEDURE — 3078F DIAST BP <80 MM HG: CPT | Performed by: STUDENT IN AN ORGANIZED HEALTH CARE EDUCATION/TRAINING PROGRAM

## 2023-11-08 PROCEDURE — G0439 PPPS, SUBSEQ VISIT: HCPCS | Performed by: STUDENT IN AN ORGANIZED HEALTH CARE EDUCATION/TRAINING PROGRAM

## 2023-11-08 ASSESSMENT — PATIENT HEALTH QUESTIONNAIRE - PHQ9: CLINICAL INTERPRETATION OF PHQ2 SCORE: 0

## 2023-11-08 ASSESSMENT — ENCOUNTER SYMPTOMS: GENERAL WELL-BEING: GOOD

## 2023-11-08 ASSESSMENT — FIBROSIS 4 INDEX: FIB4 SCORE: 1.82

## 2023-11-08 ASSESSMENT — ACTIVITIES OF DAILY LIVING (ADL): BATHING_REQUIRES_ASSISTANCE: 0

## 2023-11-08 NOTE — PROGRESS NOTES
Chief Complaint   Patient presents with    Annual Wellness Visit       HPI:  Alexey Caballero is a 78 y.o. here for Medicare Annual Wellness Visit     Patient Active Problem List    Diagnosis Date Noted    Skin cancer screening 11/08/2023    Prediabetes 11/18/2022    Late effect of stroke 11/10/2021    Mixed hyperlipidemia 11/10/2021    Ischemic stroke (HCC) 10/12/2021    Aphasia 10/12/2021    Bilateral carotid artery stenosis 10/12/2021    Seborrheic keratosis 12/12/2017    History of MI (myocardial infarction) 03/17/2017    Coronary artery disease involving native coronary artery of native heart without angina pectoris 03/17/2017    Essential hypertension 03/17/2017    History of colonoscopy with polypectomy 03/17/2017    Obesity (BMI 30-39.9) 03/17/2017       Current Outpatient Medications   Medication Sig Dispense Refill    atorvastatin (LIPITOR) 80 MG tablet TAKE 1 TABLET BY MOUTH EVERY DAY IN THE EVENING 90 Tablet 2    losartan (COZAAR) 50 MG Tab TAKE 1 TABLET BY MOUTH EVERY DAY 90 Tablet 2    aspirin (ASA) 81 MG Chew Tab chewable tablet Chew 1 Tablet every day. 100 Tablet 2    acetaminophen (TYLENOL) 325 MG Tab Take 2 Tablets by mouth every 6 hours as needed.      GARLIC PO Take 1 Capsule by mouth every day.      Omega-3 Fatty Acids (FISH OIL PO) Take 1 Capsule by mouth every day.      Cholecalciferol (VITAMIN D) 2000 UNIT Tab Take 2,000 Units by mouth every 7 days.       No current facility-administered medications for this visit.          Current supplements as per medication list.     Allergies: Tape and Statins [hmg-coa-r inhibitors]    Current social contact/activities:     He  reports that he has quit smoking. He has never used smokeless tobacco. He reports current alcohol use. He reports that he does not use drugs.  Counseling given: Not Answered      ROS:    Gait: Uses a cane  Ostomy: No  Other tubes: No  Amputations: No  Chronic oxygen use: No  Last eye exam: 2020  Wears hearing aids: No   :  Reports urinary leakage during the last 6 months that has not interfered at all with their daily activities or sleep.    Screening:    Depression Screening  Little interest or pleasure in doing things?  0 - not at all  Feeling down, depressed , or hopeless? 0 - not at all  Patient Health Questionnaire Score: 0     If depressive symptoms identified deferred to follow up visit unless specifically addressed in assessment and plan.    Interpretation of PHQ-9 Total Score   Score Severity   1-4 No Depression   5-9 Mild Depression   10-14 Moderate Depression   15-19 Moderately Severe Depression   20-27 Severe Depression    Screening for Cognitive Impairment  Do you or any of your friends or family members have any concern about your memory? Yes  Three Minute Recall (Banana, Sunrise, Chair) 3/3    Sravan clock face with all 12 numbers and set the hands to show 20 past 8.  Yes    Cognitive concerns identified deferred for follow up unless specifically addressed in assessment and plan.    Fall Risk Assessment  Has the patient had two or more falls in the last year or any fall with injury in the last year?  Yes    Safety Assessment  Do you always wear your seatbelt?  Yes  Any changes to home needed to function safely? No  Difficulty hearing.  No  Patient counseled about all safety risks that were identified.    Functional Assessment ADLs  Are there any barriers preventing you from cooking for yourself or meeting nutritional needs?  No.    Are there any barriers preventing you from driving safely or obtaining transportation?  No.    Are there any barriers preventing you from using a telephone or calling for help?  No    Are there any barriers preventing you from shopping?  No.    Are there any barriers preventing you from taking care of your own finances?  No    Are there any barriers preventing you from managing your medications?  No    Are there any barriers preventing you from showering, bathing or dressing yourself? No    Are  there any barriers preventing you from doing housework or laundry? No  Are there any barriers preventing you from using the toilet?No  Are you currently engaging in any exercise or physical activity?  Yes.      Self-Assessment of Health  What is your perception of your health? Good  Do you sleep more than six hours a night? Yes  In the past 7 days, how much did pain keep you from doing your normal work? None  Do you spend quality time with family or friends (virtually or in person)? Yes  Do you usually eat a heart healthy diet that constists of a variety of fruits, vegetables, whole grains and fiber? Yes  Do you eat foods high in fat and/or Fast Food more than three times per week? No    Advance Care Planning  Do you have an Advance Directive, Living Will, Durable Power of , or POLST? Yes    Living Will     is not on file - instructed patient to bring in a copy to scan into their chart      Health Maintenance Summary            Overdue - Zoster (Shingles) Vaccines (1 of 2) Overdue - never done      No completion history exists for this topic.              Overdue - Influenza Vaccine (1) Overdue - never done      No completion history exists for this topic.              Postponed - COVID-19 Vaccine (1) Postponed until 11/18/2023      No completion history exists for this topic.              Postponed - Pneumococcal Vaccine: 65+ Years (1 - PCV) Postponed until 11/18/2023      No completion history exists for this topic.              IMM DTaP/Tdap/Td Vaccine (2 - Td or Tdap) Next due on 11/30/2029 11/30/2019  Imm Admin: Tdap Vaccine              Hepatitis A Vaccine (Hep A) (Series Information) Aged Out      No completion history exists for this topic.              Hepatitis B Vaccine (Hep B) (Series Information) Aged Out      No completion history exists for this topic.              HPV Vaccines (Series Information) Aged Out      No completion history exists for this topic.              Polio Vaccine  "(Inactivated Polio) (Series Information) Aged Out      No completion history exists for this topic.              Meningococcal Immunization (Series Information) Aged Out      No completion history exists for this topic.              Discontinued - Colorectal Cancer Screening  Discontinued        Frequency changed to Never automatically (Topic No Longer Applies)    03/03/2023  COLONOSCOPY RESULTS    05/15/2015  Colonoscopy (Done)                    Patient Care Team:  Rajesh Morales D.O. as PCP - General (Internal Medicine)  Esperanza Handley, PT (Physical Therapy)        Social History     Tobacco Use    Smoking status: Former    Smokeless tobacco: Never   Substance Use Topics    Alcohol use: Yes     Alcohol/week: 0.0 oz    Drug use: No     Family History   Problem Relation Age of Onset    Arthritis Father     Heart Disease Father     Hyperlipidemia Father      He  has a past medical history of Allergy, Arthritis, Blood transfusion without reported diagnosis, Cancer (HCC), and Heart attack (HCC).   Past Surgical History:   Procedure Laterality Date    ARTHROPLASTY      COLON RESECTION      OPEN REDUCTION         Exam:   /72 (BP Location: Right arm, Patient Position: Sitting, BP Cuff Size: Adult)   Pulse 64   Temp 36.2 °C (97.2 °F)   Resp 16   Ht 1.74 m (5' 8.5\")   Wt 94.9 kg (209 lb 3.2 oz)   SpO2 96%  Body mass index is 31.35 kg/m².    Hearing fair.    Dentition fair  Alert, oriented in no acute distress.  Eye contact is good, speech goal directed, affect calm    Assessment and Plan. The following treatment and monitoring plan is recommended:    Problem List Items Addressed This Visit       Ischemic stroke (HCC)    Relevant Orders    Referral to Physical Therapy    Mixed hyperlipidemia    Relevant Orders    CBC WITH DIFFERENTIAL    Comp Metabolic Panel    TSH WITH REFLEX TO FT4    Lipid Profile    Prediabetes    Relevant Orders    HEMOGLOBIN A1C    Skin cancer screening     Dermatology referral offered, " patient declined           Other Visit Diagnoses       Weakness of lower extremity, unspecified laterality        Relevant Orders    Referral to Physical Therapy    At moderate risk for fall        Relevant Orders    Referral to Physical Therapy    Medicare annual wellness visit, subsequent                    Services suggested: No services needed at this time  Health Care Screening: Age-appropriate preventive services recommended by USPTF and ACIP covered by Medicare were discussed today. Services ordered if indicated and agreed upon by the patient.  Referrals offered: Community-based lifestyle interventions to reduce health risks and promote self-management and wellness, fall prevention, nutrition, physical activity, tobacco-use cessation, weight loss, and mental health services as per orders if indicated.    Discussion today about general wellness and lifestyle habits:    Prevent falls and reduce trip hazards; Cautioned about securing or removing rugs.  Have a working fire alarm and carbon monoxide detector;   Engage in regular physical activity and social activities     Follow-up: 1 year annual

## 2023-11-15 ENCOUNTER — HOSPITAL ENCOUNTER (OUTPATIENT)
Dept: LAB | Facility: MEDICAL CENTER | Age: 78
End: 2023-11-15
Attending: STUDENT IN AN ORGANIZED HEALTH CARE EDUCATION/TRAINING PROGRAM
Payer: MEDICARE

## 2023-11-15 DIAGNOSIS — E78.2 MIXED HYPERLIPIDEMIA: ICD-10-CM

## 2023-11-15 DIAGNOSIS — R73.03 PREDIABETES: ICD-10-CM

## 2023-11-15 LAB
ALBUMIN SERPL BCP-MCNC: 4.4 G/DL (ref 3.2–4.9)
ALBUMIN/GLOB SERPL: 1.8 G/DL
ALP SERPL-CCNC: 56 U/L (ref 30–99)
ALT SERPL-CCNC: 27 U/L (ref 2–50)
ANION GAP SERPL CALC-SCNC: 8 MMOL/L (ref 7–16)
AST SERPL-CCNC: 26 U/L (ref 12–45)
BASOPHILS # BLD AUTO: 0.8 % (ref 0–1.8)
BASOPHILS # BLD: 0.07 K/UL (ref 0–0.12)
BILIRUB SERPL-MCNC: 0.8 MG/DL (ref 0.1–1.5)
BUN SERPL-MCNC: 14 MG/DL (ref 8–22)
CALCIUM ALBUM COR SERPL-MCNC: 9.3 MG/DL (ref 8.5–10.5)
CALCIUM SERPL-MCNC: 9.6 MG/DL (ref 8.5–10.5)
CHLORIDE SERPL-SCNC: 107 MMOL/L (ref 96–112)
CHOLEST SERPL-MCNC: 131 MG/DL (ref 100–199)
CO2 SERPL-SCNC: 30 MMOL/L (ref 20–33)
CREAT SERPL-MCNC: 0.96 MG/DL (ref 0.5–1.4)
EOSINOPHIL # BLD AUTO: 0.24 K/UL (ref 0–0.51)
EOSINOPHIL NFR BLD: 2.9 % (ref 0–6.9)
ERYTHROCYTE [DISTWIDTH] IN BLOOD BY AUTOMATED COUNT: 47.6 FL (ref 35.9–50)
EST. AVERAGE GLUCOSE BLD GHB EST-MCNC: 120 MG/DL
FASTING STATUS PATIENT QL REPORTED: NORMAL
GFR SERPLBLD CREATININE-BSD FMLA CKD-EPI: 80 ML/MIN/1.73 M 2
GLOBULIN SER CALC-MCNC: 2.4 G/DL (ref 1.9–3.5)
GLUCOSE SERPL-MCNC: 117 MG/DL (ref 65–99)
HBA1C MFR BLD: 5.8 % (ref 4–5.6)
HCT VFR BLD AUTO: 45.3 % (ref 42–52)
HDLC SERPL-MCNC: 62 MG/DL
HGB BLD-MCNC: 14.9 G/DL (ref 14–18)
IMM GRANULOCYTES # BLD AUTO: 0.02 K/UL (ref 0–0.11)
IMM GRANULOCYTES NFR BLD AUTO: 0.2 % (ref 0–0.9)
LDLC SERPL CALC-MCNC: 59 MG/DL
LYMPHOCYTES # BLD AUTO: 2.77 K/UL (ref 1–4.8)
LYMPHOCYTES NFR BLD: 33.5 % (ref 22–41)
MCH RBC QN AUTO: 32.5 PG (ref 27–33)
MCHC RBC AUTO-ENTMCNC: 32.9 G/DL (ref 32.3–36.5)
MCV RBC AUTO: 98.9 FL (ref 81.4–97.8)
MONOCYTES # BLD AUTO: 0.54 K/UL (ref 0–0.85)
MONOCYTES NFR BLD AUTO: 6.5 % (ref 0–13.4)
NEUTROPHILS # BLD AUTO: 4.62 K/UL (ref 1.82–7.42)
NEUTROPHILS NFR BLD: 56.1 % (ref 44–72)
NRBC # BLD AUTO: 0 K/UL
NRBC BLD-RTO: 0 /100 WBC (ref 0–0.2)
PLATELET # BLD AUTO: 221 K/UL (ref 164–446)
PMV BLD AUTO: 13.4 FL (ref 9–12.9)
POTASSIUM SERPL-SCNC: 5.1 MMOL/L (ref 3.6–5.5)
PROT SERPL-MCNC: 6.8 G/DL (ref 6–8.2)
RBC # BLD AUTO: 4.58 M/UL (ref 4.7–6.1)
SODIUM SERPL-SCNC: 145 MMOL/L (ref 135–145)
T4 FREE SERPL-MCNC: 0.96 NG/DL (ref 0.93–1.7)
TRIGL SERPL-MCNC: 51 MG/DL (ref 0–149)
TSH SERPL DL<=0.005 MIU/L-ACNC: 10.3 UIU/ML (ref 0.38–5.33)
WBC # BLD AUTO: 8.3 K/UL (ref 4.8–10.8)

## 2023-11-15 PROCEDURE — 80061 LIPID PANEL: CPT

## 2023-11-15 PROCEDURE — 85025 COMPLETE CBC W/AUTO DIFF WBC: CPT

## 2023-11-15 PROCEDURE — 80053 COMPREHEN METABOLIC PANEL: CPT

## 2023-11-15 PROCEDURE — 84443 ASSAY THYROID STIM HORMONE: CPT

## 2023-11-15 PROCEDURE — 84439 ASSAY OF FREE THYROXINE: CPT

## 2023-11-15 PROCEDURE — 36415 COLL VENOUS BLD VENIPUNCTURE: CPT | Mod: GA

## 2023-11-15 PROCEDURE — 83036 HEMOGLOBIN GLYCOSYLATED A1C: CPT | Mod: GA

## 2023-11-17 ENCOUNTER — TELEPHONE (OUTPATIENT)
Dept: MEDICAL GROUP | Facility: LAB | Age: 78
End: 2023-11-17
Payer: COMMERCIAL

## 2023-11-18 NOTE — TELEPHONE ENCOUNTER
----- Message from Rajesh Morales D.O. sent at 11/17/2023  3:07 PM PST -----  Patient will need appointment to discuss blood work, virtual is okay if you would like.

## 2023-12-01 ENCOUNTER — PHYSICAL THERAPY (OUTPATIENT)
Dept: PHYSICAL THERAPY | Facility: REHABILITATION | Age: 78
End: 2023-12-01
Attending: STUDENT IN AN ORGANIZED HEALTH CARE EDUCATION/TRAINING PROGRAM
Payer: MEDICARE

## 2023-12-01 DIAGNOSIS — I63.9 ISCHEMIC STROKE (HCC): ICD-10-CM

## 2023-12-01 DIAGNOSIS — Z91.81 AT MODERATE RISK FOR FALL: ICD-10-CM

## 2023-12-01 DIAGNOSIS — R29.898 WEAKNESS OF LOWER EXTREMITY, UNSPECIFIED LATERALITY: ICD-10-CM

## 2023-12-01 PROCEDURE — 97162 PT EVAL MOD COMPLEX 30 MIN: CPT

## 2023-12-01 SDOH — ECONOMIC STABILITY: GENERAL: QUALITY OF LIFE: GOOD

## 2023-12-01 ASSESSMENT — ENCOUNTER SYMPTOMS: PAIN LOCATION: DENIES
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
## 2023-12-05 ENCOUNTER — PHYSICAL THERAPY (OUTPATIENT)
Dept: PHYSICAL THERAPY | Facility: REHABILITATION | Age: 78
End: 2023-12-05
Attending: STUDENT IN AN ORGANIZED HEALTH CARE EDUCATION/TRAINING PROGRAM
Payer: MEDICARE

## 2023-12-05 DIAGNOSIS — R29.898 WEAKNESS OF LOWER EXTREMITY, UNSPECIFIED LATERALITY: ICD-10-CM

## 2023-12-05 PROCEDURE — 97110 THERAPEUTIC EXERCISES: CPT

## 2023-12-05 PROCEDURE — 97112 NEUROMUSCULAR REEDUCATION: CPT

## 2023-12-05 NOTE — OP THERAPY DAILY TREATMENT
"  Outpatient Physical Therapy  DAILY TREATMENT     [facility] Physical [address].  [address]  [place] [address]  Phone:  [phone]  Fax:  [phone]    Date: 2023    Patient: Alfredo Caballero  YOB: 1945  MRN: [id number]     Time Calculation    Start time: 1015  Stop time: 1100 Time Calculation (min): 45 minutes         Chief Complaint: Difficulty Walking and Loss Of Balance    Visit #: 2    SUBJECTIVE:  Doing HEP: bridging, added forward planks forward    OBJECTIVE:  Current objective measures:       MiniBest Test    Anticipatory  STS:2  Rise to Toes:1/2-hold 3 sec, w/o resistance  SLS: Left 1.-8  Right. 1.-6sec   Subscore  5/6    Reactive Postural Control  Compensatory Stepping Correction-Forward:  Compensatory Stepping Correction-Backwards:  Compensatory Stepping Correction-Lateral: Left:  Right:  Subscore /6    Sensory Orientation:  Stance (feet together) Eyes open, Firm surface:2  Stance (feet together) Eyes closed, Foam Surface:2  Incline -eyes closed:2  Subscore 6 /6    Dynamic Gait  Changes in gait speed:2  Walk with head turns-horizontal:1  Walk with pivot turns:2  Step over obstacles:2  TU.3 Tug CO.1    Subscore 8 /10    /28    Less than 21 indicates increased risk of falling     Therapeutic Exercises (CPT 36681):     1. SL bridging, x10 ea side, 2 sets, Cues and edu on TA, decr sensation in low back, inc HS and glut activation    2. forearm plank, cues scap depress/thor flex, TA, 30\" , fatigued from performing this am.      Time-based treatments/modalities:    Physical Therapy Timed Treatment Charges  Neuromusc re-ed, balance, coor, post minutes (CPT 68716): 30 minutes  Therapeutic exercise minutes (CPT 34453): 15 minutes    The Activities-specific Balance Confidence Scale    Walk around the house.85  Walk up or down stairs.85  Bend over and  a slipper from the front of a closet floor.80  Reach for a small can off a shelf at eye " level.90  Stand on your tip toes and reach for something over head.75  Stand on a chair and reach for something.40  Sweep the floor.90  Walk outside the house to a car parked I the driveway.90  Get into or out of a car.90  Walk across a parking lot to the mall.85  Walk up or down a ramp.80  Walk in a crowded mall where people rapidly walk past you.70  Are bumped into by people as you walk through the mall.60  Step onto or off an escalator while you are holding on to a railing.90  Step on to ot off an escalator while holding onto parcels such that you can't use the railing.75  Walk outside on icy sidewalks.10    Total:855    Scoring 855/16=  53.4%    Less than 67% indicates increased risk for falling    ASSESSMENT:   Response to treatment: Fatigued quickly during miniBEST.  Edu on need to inc CV endurance at home, focus on 6-10min walk with inc intensity of uphill/downhill surfaces and inc pace to inc HR.    PLAN/RECOMMENDATIONS:   Plan for treatment: therapy treatment to continue next visit.  Planned interventions for next visit:complete balance reactions/miniBEST, 6MWT continue with current treatment.

## 2023-12-08 ENCOUNTER — PHYSICAL THERAPY (OUTPATIENT)
Dept: PHYSICAL THERAPY | Facility: REHABILITATION | Age: 78
End: 2023-12-08
Attending: STUDENT IN AN ORGANIZED HEALTH CARE EDUCATION/TRAINING PROGRAM
Payer: MEDICARE

## 2023-12-08 DIAGNOSIS — I63.9 ISCHEMIC STROKE (HCC): ICD-10-CM

## 2023-12-08 DIAGNOSIS — Z74.09 IMPAIRED FUNCTIONAL MOBILITY, BALANCE, GAIT, AND ENDURANCE: ICD-10-CM

## 2023-12-08 PROCEDURE — 97112 NEUROMUSCULAR REEDUCATION: CPT

## 2023-12-08 PROCEDURE — 97110 THERAPEUTIC EXERCISES: CPT

## 2023-12-08 NOTE — OP THERAPY DAILY TREATMENT
"  Outpatient Physical Therapy  DAILY TREATMENT     [facility] Physical [address]  [address]  [place] [address]  Phone:  [phone]  Fax:  [phone]    Date: 2023    Patient: Alfredo Caballero  YOB: 1945  MRN: [id number]     Time Calculation    Start time: 1015  Stop time: 1100 Time Calculation (min): 45 minutes         Chief Complaint: Loss Of Balance    Visit #: 3    SUBJECTIVE:  Doing HEP: bridging, added forward planks forward, doing exercise routine in the am.    Following last session, shakiness took a couple hours to stop.  Found no limitations that day or the next day.     OBJECTIVE:  Current objective measures:       MiniBest Test    Anticipatory  STS:2  Rise to Toes:1/2-hold 3 sec, w/o resistance  SLS: Left 1.-8  Right. 1.-6sec   Subscore  5/6    Reactive Postural Control  Compensatory Stepping Correction-Forward:2  Compensatory Stepping Correction-Backwards:2  Compensatory Stepping Correction-Lateral: Left:  2Right:2  Subscore 6/6    Sensory Orientation:  Stance (feet together) Eyes open, Firm surface:2  Stance (feet together) Eyes closed, Foam Surface:2  Incline -eyes closed:2  Subscore 6 /6    Dynamic Gait  Changes in gait speed:2  Walk with head turns-horizontal:1  Walk with pivot turns:2  Step over obstacles:2  TU.3 Tug CO.1    Subscore 8 /10    25/28    Less than 21 indicates increased risk of falling     Therapeutic Exercises (CPT 50110):     1. SL bridging, NT, Cues and edu on TA, decr sensation in low back, inc HS and glut activation    2. forearm plank, NT, 30\" , fatigued from performing this am.    3. STS no UE's, NT    4. Heel raises, 15 x 3 on single L LE, R LE 2 feet up, 1 down    Therapeutic Treatments and Modalities:     1. Neuromuscular Re-education (CPT 38799), see miniBEST completion and note below    Therapeutic Treatment and Modalities Summary: NMR: discussion at length on pt's PMHx, high pain tolerance and orthopedic vs " neuroplasticity recovery  Balance trainign: ankle strategy on ant/post board x 10 unsupported  Progressed to baldo discs: ant/post x 10, fatigued R LE quickly. Edu on HEP with obtaining baldo discs.    Discussed long term goals for therapy, optimal R sided recovery vs functional goals/return to specific activities.    Time-based treatments/modalities:    Physical Therapy Timed Treatment Charges  Neuromusc re-ed, balance, coor, post minutes (CPT 11291): 30 minutes  Therapeutic exercise minutes (CPT 12170): 15 minutes    The Activities-specific Balance Confidence Scale    Walk around the house.85  Walk up or down stairs.85  Bend over and  a slipper from the front of a closet floor.80  Reach for a small can off a shelf at eye level.90  Stand on your tip toes and reach for something over head.75  Stand on a chair and reach for something.40  Sweep the floor.90  Walk outside the house to a car parked I the driveway.90  Get into or out of a car.90  Walk across a parking lot to the mall.85  Walk up or down a ramp.80  Walk in a crowded mall where people rapidly walk past you.70  Are bumped into by people as you walk through the mall.60  Step onto or off an escalator while you are holding on to a railing.90  Step on to ot off an escalator while holding onto parcels such that you can't use the railing.75  Walk outside on icy sidewalks.10    Total:855    Scoring 855/16=  53.4%    Less than 67% indicates increased risk for falling    ASSESSMENT:   Response to treatment: Completed miniBEST 25/38.  Discussed goal to improve higher level balance with VOR and LE strengthening.    PLAN/RECOMMENDATIONS:   Plan for treatment: therapy treatment to continue next visit.  Planned interventions for next visit:continue with current treatment.

## 2023-12-12 ENCOUNTER — PHYSICAL THERAPY (OUTPATIENT)
Dept: PHYSICAL THERAPY | Facility: REHABILITATION | Age: 78
End: 2023-12-12
Attending: STUDENT IN AN ORGANIZED HEALTH CARE EDUCATION/TRAINING PROGRAM
Payer: MEDICARE

## 2023-12-12 DIAGNOSIS — Z74.09 IMPAIRED FUNCTIONAL MOBILITY, BALANCE, GAIT, AND ENDURANCE: ICD-10-CM

## 2023-12-12 DIAGNOSIS — R29.898 WEAKNESS OF LOWER EXTREMITY, UNSPECIFIED LATERALITY: ICD-10-CM

## 2023-12-12 PROCEDURE — 97112 NEUROMUSCULAR REEDUCATION: CPT

## 2023-12-12 PROCEDURE — 97110 THERAPEUTIC EXERCISES: CPT

## 2023-12-12 NOTE — OP THERAPY DAILY TREATMENT
Outpatient Physical Therapy  DAILY TREATMENT     [facility] Physical Therapy [address]  [address]  [place] [address]  Phone:  [phone]  Fax:  [phone]    Date: 2023    Patient: Alfredo Caballero  YOB: 1945  MRN: [id number]     Time Calculation    Start time: 1015  Stop time: 1100 Time Calculation (min): 45 minutes         Chief Complaint: Loss Of Balance    Visit #: 4    SUBJECTIVE:  Doing HEP: added heel raises, soreness behind the knees and R foot having increased pain/soreness following the heel raises.   Ordered and has been working the baldo discs.  Denies working on CV endurance yet.  Has a couple projects at home, doing   OBJECTIVE:  Current objective measures:   6MWT: 1392ft no AD      MiniBest Test    Anticipatory  STS:2  Rise to Toes:1/2-hold 3 sec, w/o resistance  SLS: Left 1.-8  Right. 1.-6sec   Subscore  5/6    Reactive Postural Control  Compensatory Stepping Correction-Forward:2  Compensatory Stepping Correction-Backwards:2  Compensatory Stepping Correction-Lateral: Left:  2Right:2  Subscore 6/6    Sensory Orientation:  Stance (feet together) Eyes open, Firm surface:2  Stance (feet together) Eyes closed, Foam Surface:2  Incline -eyes closed:2  Subscore 6 /6    Dynamic Gait  Changes in gait speed:2  Walk with head turns-horizontal:1  Walk with pivot turns:2  Step over obstacles:2  TU.3 Tug CO.1    Subscore 8 /10    25/28    Less than 21 indicates increased risk of falling     Therapeutic Exercises (CPT 65564):     5. circuit training    6. baldo discs hz head turns, x10    7. vertical head turns, x10    8. EC, unable d/t faitgue from the HEP    9. sidelying running man, L LE x10, R LE x5, resulting in pain in R knee, modified to perform in supine, HEP, edu on global hip strengthening to improve LE coordination and stability with gait and high end balance    Therapeutic Treatments and Modalities:     1. Neuromuscular Re-education (CPT 40028), see  below    Therapeutic Treatment and Modalities Summary: NMR:   Balance training: ankle strategy on baldo discs: hz head turns, able to inc speed of rotation x 10, vertical x10 slowed speed, EC unable d/t LE fatigue, sig LOB ant/post while on baldo discs.    Time-based treatments/modalities:    Physical Therapy Timed Treatment Charges  Neuromusc re-ed, balance, coor, post minutes (CPT 57751): 15 minutes  Therapeutic exercise minutes (CPT 58154): 30 minutes    The Activities-specific Balance Confidence Scale    Walk around the house.85  Walk up or down stairs.85  Bend over and  a slipper from the front of a closet floor.80  Reach for a small can off a shelf at eye level.90  Stand on your tip toes and reach for something over head.75  Stand on a chair and reach for something.40  Sweep the floor.90  Walk outside the house to a car parked I the driveway.90  Get into or out of a car.90  Walk across a parking lot to the mall.85  Walk up or down a ramp.80  Walk in a crowded mall where people rapidly walk past you.70  Are bumped into by people as you walk through the mall.60  Step onto or off an escalator while you are holding on to a railing.90  Step on to ot off an escalator while holding onto parcels such that you can't use the railing.75  Walk outside on icy sidewalks.10    Total:855    Scoring 855/16=  53.4%    Less than 67% indicates increased risk for falling      ASSESSMENT:   Response to treatment: Demo improving LE endurance, cont to have limitations when high level balance on baldo discs, RLE giving way, approx 25min into session(after 6MWT and standing on baldo discs for 10min).    PLAN/RECOMMENDATIONS:   Plan for treatment: therapy treatment to continue next visit.  Planned interventions for next visit:IPN, integrate community endurance/gait/intensity training continue with current treatment.

## 2023-12-15 ENCOUNTER — PHYSICAL THERAPY (OUTPATIENT)
Dept: PHYSICAL THERAPY | Facility: REHABILITATION | Age: 78
End: 2023-12-15
Attending: STUDENT IN AN ORGANIZED HEALTH CARE EDUCATION/TRAINING PROGRAM
Payer: MEDICARE

## 2023-12-15 DIAGNOSIS — Z74.09 IMPAIRED FUNCTIONAL MOBILITY, BALANCE, GAIT, AND ENDURANCE: ICD-10-CM

## 2023-12-15 PROCEDURE — 97110 THERAPEUTIC EXERCISES: CPT

## 2023-12-15 PROCEDURE — 97112 NEUROMUSCULAR REEDUCATION: CPT

## 2023-12-15 NOTE — OP THERAPY DAILY TREATMENT
Outpatient Physical Therapy  DAILY TREATMENT     [facility] Physical Therapy [address].  [address]  [place] [address]  Phone:  [phone]  Fax:  [phone]    Date: 12/15/2023    Patient: Alfredo Caballero  YOB: 1945  MRN: [id number]     Time Calculation    Start time: 1048  Stop time: 1132 Time Calculation (min): 44 minutes         Chief Complaint: Loss Of Balance    Visit #: 5    SUBJECTIVE:  Doing HEP: added heel raises, soreness behind the knees and R foot having increased pain/soreness following the heel raises.   Ordered and has been working the baldo discs.  Denies working on CV endurance yet.  Has a couple projects at home, doing   OBJECTIVE:  Current objective measures:   6MWT: 1392ft no AD      MiniBest Test    Anticipatory  STS:2  Rise to Toes:1/2-hold 3 sec, w/o resistance  SLS: Left 1.-8  Right. 1.-6sec   Subscore  5/6    Reactive Postural Control  Compensatory Stepping Correction-Forward:2  Compensatory Stepping Correction-Backwards:2  Compensatory Stepping Correction-Lateral: Left:  2Right:2  Subscore 6/6    Sensory Orientation:  Stance (feet together) Eyes open, Firm surface:2  Stance (feet together) Eyes closed, Foam Surface:2  Incline -eyes closed:2  Subscore 6 /6    Dynamic Gait  Changes in gait speed:2  Walk with head turns-horizontal:1  Walk with pivot turns:2  Step over obstacles:2  TU.3 Tug CO.1    Subscore 8 /10    25/28    Less than 21 indicates increased risk of falling     Therapeutic Exercises (CPT 63906):     5. circuit training    6. baldo discs hz head turns, x10    7. vertical head turns, x10    8. EC, unable d/t faitgue from the HEP    9. sidelying running man, L LE x10, R LE x5, resulting in pain in R knee, modified to perform in supine, HEP, edu on global hip strengthening to improve LE coordination and stability with gait and high end balance      Therapeutic Exercise Summary: Denies issues with R LE with running man in sidelying when  not fatigued from all ex's.     Therapeutic Treatments and Modalities:     1. Neuromuscular Re-education (CPT 57398), see below    Therapeutic Treatment and Modalities Summary: NMR:   Balance training: ankle strategy on baldo discs: hz head turns, able to inc speed of rotation x 10, vertical x10 slowed speed, EC unable d/t LE fatigue, sig LOB ant/post while on baldo discs.    Time-based treatments/modalities:    Physical Therapy Timed Treatment Charges  Neuromusc re-ed, balance, coor, post minutes (CPT 77449): 20 minutes  Therapeutic exercise minutes (CPT 75448): 25 minutes    The Activities-specific Balance Confidence Scale    Walk around the house.85  Walk up or down stairs.85  Bend over and  a slipper from the front of a closet floor.80  Reach for a small can off a shelf at eye level.90  Stand on your tip toes and reach for something over head.75  Stand on a chair and reach for something.40  Sweep the floor.90  Walk outside the house to a car parked I the driveway.90  Get into or out of a car.90  Walk across a parking lot to the mall.85  Walk up or down a ramp.80  Walk in a crowded mall where people rapidly walk past you.70  Are bumped into by people as you walk through the mall.60  Step onto or off an escalator while you are holding on to a railing.90  Step on to ot off an escalator while holding onto parcels such that you can't use the railing.75  Walk outside on icy sidewalks.10    Total:855    Scoring 855/16=  53.4%    Less than 67% indicates increased risk for falling      ASSESSMENT:   Response to treatment: see PN    PLAN/RECOMMENDATIONS:   Plan for treatment: therapy treatment to continue next visit.  Planned interventions for next visit: PN, integrate community endurance/gait/intensity training continue with current treatment.

## 2023-12-15 NOTE — OP THERAPY PROGRESS SUMMARY
Outpatient Physical Therapy  PROGRESS SUMMARY NOTE      [facility] Physical Therapy [place] [address] [facility] [facility] [address]  [address]  [place] [address]  Phone:  [phone]  Fax:  [phone]    Date of Visit: 12/15/2023    Patient: Alfredo Caballero  YOB: 1945  MRN: [id number]     Referring Provider: Rajesh Morales D.O.  [address] [facility] [address]  [place] [address]   Referring Diagnosis Cerebral infarction, unspecified [I63.9];Other symptoms and signs involving the musculoskeletal system [R29.898];History of falling [Z91.81]     Visit Diagnoses     ICD-10-CM   1. Impaired functional mobility, balance, gait, and endurance  Z74.09       Rehab Potential: good    Progress Report Period: 23-12/15/2023    Functional Assessment Used          Objective Findings and Assessment:   Patient progression towards goals: Pt feels the walking and function with walking is getting easier.  Having less challenge with uneven surfaces and subjectively has son telling him he's doing better with walking.  Pt demo improvement in 5xSTS and 30secSTS demo functional improvement.  Demo cont impaired SLS and impaired LE strength, pt would benefit from ongoing PT to address these impairments.    Objective findings and assessment details: MiniBest Test    Anticipatory  STS:2  Rise to Toes:2  SLS: Left 1.-10  Right. 1.-12 sec   Subscore  5/6    Reactive Postural Control  Compensatory Stepping Correction-Forward:2  Compensatory Stepping Correction-Backwards:2  Compensatory Stepping Correction-Lateral: Left:  2Right:2  Subscore 6/6    Sensory Orientation:  Stance (feet together) Eyes open, Firm surface:2  Stance (feet together) Eyes closed, Foam Surface:2  Incline -eyes closed:2  Subscore 6 /6    Dynamic Gait  Changes in gait speed:2  Walk with head turns-horizontal:1-inc LOB lat sway with R and L turns.  Walk with pivot turns:2  Step over obstacles:2  TU.06 Tug CO.1-2    Subscore 9 /10    12/15/23  5xSTS: 9.2sec   30sec  "STS test: 18reps    12/1/23 Coordination comments:   5xSTS: 11.0 no UE's, 18\"h chair  30sec STS test: 12reps        Goals:   Short Term Goals:   1. Pt will be compliant with HEP-MET  2. Pt will be able to perform miniBEST, 6MWT-MET                 Short term goal time span:  2-4 weeks      Long Term Goals:    1. Pt will be able to demo improvement in TUG >4sec, to decr risk for falls-MET  2. Pt will be able to improve miniBEST by 4 to dec risk for falls-not met, progressing  3. Pt will be able to demo improvement in 6MWT by 200ft to improve CV endurance for community activities.-MET    Long term goal time span:  6-8 weeks    Plan:   Planned therapy interventions:  Neuromuscular Re-education (CPT 27045) and Therapeutic Exercise (CPT 29054)  Frequency:  1x week  Duration in weeks:  8      Referring provider co-signature:  I have reviewed this plan of care and my co-signature certifies the need for services.     Certification Period: 12/15/2023 to 02/09/24    Physician Signature: ________________________________ Date: ______________          "

## 2024-01-08 DIAGNOSIS — I10 ESSENTIAL HYPERTENSION: ICD-10-CM

## 2024-01-08 DIAGNOSIS — I63.9 ACUTE CVA (CEREBROVASCULAR ACCIDENT) (HCC): ICD-10-CM

## 2024-01-09 RX ORDER — ATORVASTATIN CALCIUM 80 MG/1
TABLET, FILM COATED ORAL
Qty: 90 TABLET | Refills: 2 | Status: SHIPPED | OUTPATIENT
Start: 2024-01-09

## 2024-01-09 RX ORDER — LOSARTAN POTASSIUM 50 MG/1
50 TABLET ORAL DAILY
Qty: 90 TABLET | Refills: 2 | Status: SHIPPED | OUTPATIENT
Start: 2024-01-09

## 2024-01-26 ENCOUNTER — PHYSICAL THERAPY (OUTPATIENT)
Dept: PHYSICAL THERAPY | Facility: REHABILITATION | Age: 79
End: 2024-01-26
Attending: PHYSICAL MEDICINE & REHABILITATION
Payer: MEDICARE

## 2024-01-26 DIAGNOSIS — R29.898 WEAKNESS OF LOWER EXTREMITY, UNSPECIFIED LATERALITY: ICD-10-CM

## 2024-01-26 DIAGNOSIS — Z74.09 IMPAIRED FUNCTIONAL MOBILITY, BALANCE, GAIT, AND ENDURANCE: ICD-10-CM

## 2024-01-26 PROCEDURE — 97110 THERAPEUTIC EXERCISES: CPT

## 2024-01-26 PROCEDURE — 97112 NEUROMUSCULAR REEDUCATION: CPT

## 2024-01-26 NOTE — OP THERAPY DAILY TREATMENT
Outpatient Physical Therapy  DAILY TREATMENT     [facility] Physical [address]  [address]  [place] [address]  Phone:  [phone]  Fax:  [phone]    Date: 01/26/2024    Patient: Alfredo Caballero  YOB: 1945  MRN: [id number]     Time Calculation    Start time: 1145  Stop time: 1230 Time Calculation (min): 45 minutes         Chief Complaint: Back Problem and Loss Of Balance    Visit #: 6    SUBJECTIVE:  Has been doing HEP 5-7 days a week.  Feels improvement with R hip ROM and strength with donning/doffing shoes & socks.    OBJECTIVE:  Current objective measures:   See PN      MiniBest Test    Anticipatory  STS:2  Rise to Toes:2-hold 3 sec,   SLS: Left 1.-12sec  Right. 1.-11sec   Subscore  5/6    Reactive Postural Control  Compensatory Stepping Correction-Forward:2  Compensatory Stepping Correction-Backwards:2  Compensatory Stepping Correction-Lateral: Left:  2Right:2  Subscore 6/6    Sensory Orientation:  Stance (feet together) Eyes open, Firm surface:2  Stance (feet together) Eyes closed, Foam Surface:2  Incline -eyes closed:2  Subscore 6 /6    Dynamic Gait  Changes in gait speed:2  Walk with head turns-horizontal:2  Walk with pivot turns:2  Step over obstacles:2      Subscore 8/8    25/26    Less than 21 indicates increased risk of falling     Therapeutic Exercises (CPT 72132):     5. baldo discs with ant/post weight shifts, x5    6. baldo discs hz head turns, x5, * inc challenges with modified heel toe, and increased to midline    7. vertical head turns, x5    8. EC, [id number], reviewed for inc challenges at home    9. sidelying running man, R LE only, cued to increased hip abd/ext activation for inc ROM, mild/1/10 knee discomfort, denies pain    10. Bridging 2 foot, then alt leg lift, x5    11. Progressed to SL bridge, x5 cued to lift LE into SL hip ext    Therapeutic Treatments and Modalities:     1. Neuromuscular Re-education (CPT 54527), see below    Therapeutic  Treatment and Modalities Summary: NMR:   Discussion around progression of ea ex's to inc diff with inc intensity and efficiency of performance and outcome with decr time spent on ea ex.    Time-based treatments/modalities:    Physical Therapy Timed Treatment Charges  Neuromusc re-ed, balance, coor, post minutes (CPT 99062): 25 minutes  Therapeutic exercise minutes (CPT 10726): 20 minutes      ASSESSMENT:   Response to treatment: see PN    PLAN/RECOMMENDATIONS:   Plan for treatment: therapy treatment to continue next visit.  Planned interventions for next visit:HEP review/progression and d/c to HEP and community continue with current treatment.

## 2024-01-26 NOTE — OP THERAPY PROGRESS SUMMARY
Outpatient Physical Therapy  PROGRESS SUMMARY NOTE      [facility] Physical Therapy [place] [address]  [address]  [place] [address]  Phone:  [phone]  Fax:  [phone]    Date of Visit: 01/26/2024    Patient: Alfredo Caballero  YOB: 1945  MRN: [id number]     Referring Provider: Rajesh Morales D.O.  [address]  [place] [address]   Referring Diagnosis Cerebral infarction, unspecified [I63.9];Other symptoms and signs involving the musculoskeletal system [R29.898];History of falling [Z91.81]     Visit Diagnoses     ICD-10-CM   1. Impaired functional mobility, balance, gait, and endurance  Z74.09   2. Weakness of lower extremity, unspecified laterality  R29.898       Rehab Potential: good    Progress Report Period: 12/15/2023-1/26/2024    Functional Assessment Used          Objective Findings and Assessment:   Patient progression towards goals: Pt reports doing HEP daily.  UB ex's 3x/week.  Reports having years of difficulty R LE hip stiffness, now having improved ROM and strength with donning/doffing shoes and socks, improved 25/26 on miniBEST, improving core/hip stability and strength demo with SLS    Objective findings and assessment details: PF strength: L LE x 10 reps SLS with touch down support for balance/int weight   R LE x 10 reps, partial ROM fair/poor quality, single UE support for weight & balance    MiniBest Test    Anticipatory  STS:2  Rise to Toes:2  SLS: Left 1.-10  Right. 1.-12 sec   Subscore  5/6    Reactive Postural Control  Compensatory Stepping Correction-Forward:2  Compensatory Stepping Correction-Backwards:2  Compensatory Stepping Correction-Lateral: Left:  2Right:2  Subscore 6/6    Sensory Orientation:  Stance (feet together) Eyes open, Firm surface:2  Stance (feet together) Eyes closed, Foam Surface:2  Incline -eyes closed:2  Subscore 6 /6    Dynamic Gait  Changes in gait speed:2  Walk with head turns-horizontal:1-inc LOB lat sway with R and L  turns.  Walk with pivot turns:2  Step over obstacles:2  TU.06 Tug CO.1-2    Subscore 9 /10        Goals:   Short Term Goals:   1. Pt will be compliant with HEP-MET  2. Pt will be able to perform miniBEST, 6MWT-MET                 Short term goal time span:  2-4 weeks      Long Term Goals:    1. Pt will be able to demo improvement in TUG >4sec, to decr risk for falls-MET  2. Pt will be able to improve miniBEST by 4 to dec risk for falls-MET,  3. Pt will be able to demo improvement in 6MWT by 200ft to improve CV endurance for community activities.-MET  NEW GOAL:   Pt will demo improvement in B ankle strength 4/5 to improve gait stability on uneven surfaces in community    Long term goal time span:  6-8 weeks    Plan:   Planned therapy interventions:  Neuromuscular Re-education (CPT 02342) and Therapeutic Exercise (CPT 59021)  Frequency:  1x week  Duration in weeks:  8      Referring provider co-signature:  I have reviewed this plan of care and my co-signature certifies the need for services.     Certification Period: 2024 to 24    Physician Signature: ________________________________ Date: ______________

## 2024-01-31 ENCOUNTER — PHYSICAL THERAPY (OUTPATIENT)
Dept: PHYSICAL THERAPY | Facility: REHABILITATION | Age: 79
End: 2024-01-31
Attending: PHYSICAL MEDICINE & REHABILITATION
Payer: MEDICARE

## 2024-01-31 DIAGNOSIS — Z74.09 IMPAIRED FUNCTIONAL MOBILITY, BALANCE, GAIT, AND ENDURANCE: ICD-10-CM

## 2024-01-31 DIAGNOSIS — I63.9 ISCHEMIC STROKE (HCC): ICD-10-CM

## 2024-01-31 PROCEDURE — 97110 THERAPEUTIC EXERCISES: CPT

## 2024-01-31 PROCEDURE — 97112 NEUROMUSCULAR REEDUCATION: CPT

## 2024-01-31 NOTE — OP THERAPY DAILY TREATMENT
Outpatient Physical Therapy  DAILY TREATMENT     [facility] Physical [address]  [address]  [place] [address]  Phone:  [phone]  Fax:  [phone]    Date: 01/31/2024    Patient: Alfredo Caballero  YOB: 1945  MRN: [id number]     Time Calculation    Start time: 1015  Stop time: 1100 Time Calculation (min): 45 minutes         Chief Complaint: Difficulty Walking and Loss Of Balance    Visit #: 7    SUBJECTIVE:  Has been working with total gym, increasing reps to 15 x2 sets.  Takes a couple hours to recover d/t muscle fatigue and spasms, but will change routine to alt UB and LB strengthening vs 3 days a week of whole body work out.     OBJECTIVE:  Current objective measures:         Therapeutic Exercises (CPT 19142):     5. baldo discs with ant/post weight shifts, NT    6. baldo discs hz head turns, NT    9. sidelying running man, NT      Therapeutic Exercise Summary: Reviewed verbally HEP, pt has been progressing indep at home.    Therapeutic Treatments and Modalities:     1. Neuromuscular Re-education (CPT 11660), see below    Therapeutic Treatment and Modalities Summary: NMR:   Clock yourself ben: x5min with visual cues, 50BPM 1 LOB with retro/post  X5min with only auditory cues no visual 50BPM  X5min with auditory cues no visual with 50BPM, added dual cog task, naming word written, able to perform w/o much change in pace or inaccuracy with locating number.  When switched to naming color of written word, had diff naming color and performing balance task.  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    Time-based treatments/modalities:    Physical Therapy Timed Treatment Charges  Neuromusc re-ed, balance, coor, post minutes (CPT 10826): 25 minutes  Therapeutic exercise minutes (CPT 45228): 20 minutes      ASSESSMENT:   Response to treatment: Demo mod challenge with dual task of naming color of word written, as it's diff form  written word; however 50BMP very functional and high level balance function.     PLAN/RECOMMENDATIONS:   Plan for treatment: therapy treatment to continue next visit.  Planned interventions for next visit:HEP review/progression and d/c to HEP and community continue with current treatment.

## 2024-02-07 ENCOUNTER — APPOINTMENT (OUTPATIENT)
Dept: PHYSICAL THERAPY | Facility: REHABILITATION | Age: 79
End: 2024-02-07
Attending: PHYSICAL MEDICINE & REHABILITATION
Payer: MEDICARE

## 2024-02-13 ENCOUNTER — PHYSICAL THERAPY (OUTPATIENT)
Dept: PHYSICAL THERAPY | Facility: REHABILITATION | Age: 79
End: 2024-02-13
Attending: PHYSICAL MEDICINE & REHABILITATION
Payer: MEDICARE

## 2024-02-13 DIAGNOSIS — Z74.09 IMPAIRED FUNCTIONAL MOBILITY, BALANCE, GAIT, AND ENDURANCE: ICD-10-CM

## 2024-02-13 DIAGNOSIS — R29.898 WEAKNESS OF LOWER EXTREMITY, UNSPECIFIED LATERALITY: ICD-10-CM

## 2024-02-13 PROCEDURE — 97112 NEUROMUSCULAR REEDUCATION: CPT

## 2024-02-13 PROCEDURE — 97530 THERAPEUTIC ACTIVITIES: CPT

## 2024-02-13 NOTE — OP THERAPY DAILY TREATMENT
Outpatient Physical Therapy  DAILY TREATMENT     [facility] Physical Therapy [address]  [address]  [place] [address]  Phone:  [phone]  Fax:  [phone]    Date: 02/13/2024    Patient: Alfredo Caballero  YOB: 1945  MRN: [id number]     Time Calculation    Start time: 0930  Stop time: 1000 Time Calculation (min): 30 minutes         Chief Complaint: Loss Of Balance    Visit #: 8    SUBJECTIVE:  Has changed routine to alt UB and LB strengthening vs 3 days a week.    Combined ex's and balance ex's to make sense for progression & tolerance. Progressing once 15 reps are challenging, not going beyond fatigue.  Controlled ex.     OBJECTIVE:  Current objective measures:    MMT:   PF:  5/5 on single L LE( 10 quality full ROM)  R LE 4+/5( 5 full ROM, fair quality)      Therapeutic Exercises (CPT 95513):     5. baldo discs with ant/post weight shifts, NT    6. baldo discs hz head turns, NT    9. sidelying running man, NT      Therapeutic Exercise Summary: Reviewed verbally HEP, pt has been progressing indep at home.    Therapeutic Treatments and Modalities:     1. Neuromuscular Re-education (CPT 69139), see below    2. Therapeutic Activities (CPT 60344), see below    Therapeutic Treatment and Modalities Summary: NMR:   Discussed alt progressions with wbing and or adding weight for open chain running man.  Reinforced utilizing weight for improved core activation with squats.  Reviewed heel raises, edu focus on eccentric control prior to progressing to SL heel lifts.    TA:   Discussed HEP, goals, functional gains and ability to safely adapt and change progressions.  Pt denies concerns or questions regarding home program, denies current goal that aren't being met functionally.  Agreeable to d/c with HEP at this time.    Time-based treatments/modalities:    Physical Therapy Timed Treatment Charges  Neuromusc re-ed, balance, coor, post minutes (CPT 64761): 10 minutes  Therapeutic activity  minutes (CPT 96124): 20 minutes      ASSESSMENT:   Response to treatment: D/c to HEP and community mobility at this time.  Cont to demo progress with LE strength and balance.    PLAN/RECOMMENDATIONS:   Plan for treatment: therapy treatment to continue next visit.  Planned interventions for next visit:d/c to HEP and community continue with current treatment.

## 2024-10-03 DIAGNOSIS — I63.9 ACUTE CVA (CEREBROVASCULAR ACCIDENT) (HCC): ICD-10-CM

## 2024-10-03 DIAGNOSIS — I10 ESSENTIAL HYPERTENSION: ICD-10-CM

## 2024-10-03 RX ORDER — ATORVASTATIN CALCIUM 80 MG/1
TABLET, FILM COATED ORAL
Qty: 90 TABLET | Refills: 2 | Status: SHIPPED | OUTPATIENT
Start: 2024-10-03

## 2024-10-03 RX ORDER — LOSARTAN POTASSIUM 50 MG/1
50 TABLET ORAL DAILY
Qty: 90 TABLET | Refills: 2 | Status: SHIPPED | OUTPATIENT
Start: 2024-10-03

## 2025-07-08 DIAGNOSIS — I63.9 ACUTE CVA (CEREBROVASCULAR ACCIDENT) (HCC): ICD-10-CM

## 2025-07-08 DIAGNOSIS — I10 ESSENTIAL HYPERTENSION: ICD-10-CM

## 2025-07-08 RX ORDER — LOSARTAN POTASSIUM 50 MG/1
50 TABLET ORAL DAILY
Qty: 90 TABLET | Refills: 2 | Status: SHIPPED | OUTPATIENT
Start: 2025-07-08

## 2025-07-08 RX ORDER — ATORVASTATIN CALCIUM 80 MG/1
80 TABLET, FILM COATED ORAL EVERY EVENING
Qty: 90 TABLET | Refills: 2 | Status: SHIPPED | OUTPATIENT
Start: 2025-07-08